# Patient Record
Sex: MALE | Race: BLACK OR AFRICAN AMERICAN | Employment: OTHER | URBAN - METROPOLITAN AREA
[De-identification: names, ages, dates, MRNs, and addresses within clinical notes are randomized per-mention and may not be internally consistent; named-entity substitution may affect disease eponyms.]

---

## 2017-01-17 DIAGNOSIS — F41.9 ANXIETY: ICD-10-CM

## 2017-01-17 RX ORDER — ALPRAZOLAM 0.25 MG/1
0.25 TABLET ORAL
Qty: 90 TAB | Refills: 2 | Status: SHIPPED | OUTPATIENT
Start: 2017-01-17 | End: 2017-02-24 | Stop reason: SDUPTHER

## 2017-01-17 NOTE — TELEPHONE ENCOUNTER
Requested Prescriptions     Pending Prescriptions Disp Refills    ALPRAZolam (XANAX) 0.25 mg tablet 90 Tab 2     Sig: Take 1 Tab by mouth three (3) times daily as needed for Anxiety. Max Daily Amount: 0.75 mg. Last office visit was  10/21/16  Next office visit is     2/24/17    90 tabs prescribed 12/5/16 with 2 refills    Called and spoke with the wife, informed her that the pt should have two refills on file. She stated that they are switching pharmacy from [facility] to [facility] and can no longer get prescriptions from any [facility] pharmacies.     Please assist.

## 2017-01-17 NOTE — TELEPHONE ENCOUNTER
Left message for patient prescription is ready for  at the front office. Any questions call the office.

## 2017-02-02 RX ORDER — TRAMADOL HYDROCHLORIDE 50 MG/1
TABLET ORAL
Qty: 60 TAB | Refills: 0 | Status: SHIPPED | OUTPATIENT
Start: 2017-02-02 | End: 2017-02-24 | Stop reason: SDUPTHER

## 2017-02-06 DIAGNOSIS — F41.9 ANXIETY: ICD-10-CM

## 2017-02-06 RX ORDER — ALPRAZOLAM 0.25 MG/1
0.25 TABLET ORAL
Qty: 90 TAB | Refills: 2 | Status: CANCELLED | OUTPATIENT
Start: 2017-02-06

## 2017-02-06 NOTE — TELEPHONE ENCOUNTER
Pt called in requesting refill of his   Requested Prescriptions     Pending Prescriptions Disp Refills    ALPRAZolam (XANAX) 0.25 mg tablet 90 Tab 2     Sig: Take 1 Tab by mouth three (3) times daily as needed for Anxiety. Max Daily Amount: 0.75 mg.   .

## 2017-02-06 NOTE — TELEPHONE ENCOUNTER
Requested Prescriptions     Pending Prescriptions Disp Refills    ALPRAZolam (XANAX) 0.25 mg tablet 90 Tab 2     Sig: Take 1 Tab by mouth three (3) times daily as needed for Anxiety. Max Daily Amount: 0.75 mg. Last office visit was  10/21/16  Next office visit is     2/24/17     90 tabs prescribed 1/17/17 with 2 refills. Called the pharmacy and verified with amrita that the pt does have refills on file. Called to inform the pt but the mailbox was full.     Please assist.

## 2017-02-10 RX ORDER — PANTOPRAZOLE SODIUM 40 MG/1
40 TABLET, DELAYED RELEASE ORAL DAILY
Qty: 30 TAB | Refills: 3 | Status: SHIPPED | OUTPATIENT
Start: 2017-02-10 | End: 2017-06-09 | Stop reason: SDUPTHER

## 2017-02-10 NOTE — TELEPHONE ENCOUNTER
pts wife request the following medication refilled/pharmacy changed from prior refill     pantoprazole (PROTONIX) 40 mg tablet     [place] [facility] [place] [id number]

## 2017-02-17 ENCOUNTER — HOSPITAL ENCOUNTER (OUTPATIENT)
Dept: LAB | Age: 77
Discharge: HOME | End: 2017-02-17
Payer: MEDICARE

## 2017-02-17 DIAGNOSIS — E78.5 DYSLIPIDEMIA: ICD-10-CM

## 2017-02-17 DIAGNOSIS — I10 HTN (HYPERTENSION), BENIGN: ICD-10-CM

## 2017-02-17 DIAGNOSIS — E11.40 TYPE 2 DIABETES MELLITUS WITH DIABETIC NEUROPATHY, WITHOUT LONG-TERM CURRENT USE OF INSULIN (HCC): ICD-10-CM

## 2017-02-17 PROCEDURE — 36415 COLL VENOUS BLD VENIPUNCTURE: CPT | Performed by: INTERNAL MEDICINE

## 2017-02-17 PROCEDURE — 83036 HEMOGLOBIN GLYCOSYLATED A1C: CPT | Performed by: INTERNAL MEDICINE

## 2017-02-17 PROCEDURE — 80053 COMPREHEN METABOLIC PANEL: CPT | Performed by: INTERNAL MEDICINE

## 2017-02-17 PROCEDURE — 80061 LIPID PANEL: CPT | Performed by: INTERNAL MEDICINE

## 2017-02-18 LAB
ALBUMIN SERPL BCP-MCNC: 4 G/DL (ref 3.4–5)
ALBUMIN/GLOB SERPL: 1.3 {RATIO} (ref 0.8–1.7)
ALP SERPL-CCNC: 156 U/L (ref 45–117)
ALT SERPL-CCNC: 34 U/L (ref 16–61)
ANION GAP BLD CALC-SCNC: 12 MMOL/L (ref 3–18)
AST SERPL W P-5'-P-CCNC: 31 U/L (ref 15–37)
BILIRUB SERPL-MCNC: 0.5 MG/DL (ref 0.2–1)
BUN SERPL-MCNC: 10 MG/DL (ref 7–18)
BUN/CREAT SERPL: 8 (ref 12–20)
CALCIUM SERPL-MCNC: 8 MG/DL (ref 8.5–10.1)
CHLORIDE SERPL-SCNC: 103 MMOL/L (ref 100–108)
CHOLEST SERPL-MCNC: 105 MG/DL
CO2 SERPL-SCNC: 28 MMOL/L (ref 21–32)
CREAT SERPL-MCNC: 1.33 MG/DL (ref 0.6–1.3)
GLOBULIN SER CALC-MCNC: 3.1 G/DL (ref 2–4)
GLUCOSE SERPL-MCNC: 111 MG/DL (ref 74–99)
HBA1C MFR BLD: 6 % (ref 4.2–5.6)
HDLC SERPL-MCNC: 42 MG/DL (ref 40–60)
HDLC SERPL: 2.5 {RATIO} (ref 0–5)
LDLC SERPL CALC-MCNC: 33.6 MG/DL (ref 0–100)
LIPID PROFILE,FLP: NORMAL
POTASSIUM SERPL-SCNC: 3.7 MMOL/L (ref 3.5–5.5)
PROT SERPL-MCNC: 7.1 G/DL (ref 6.4–8.2)
SODIUM SERPL-SCNC: 143 MMOL/L (ref 136–145)
TRIGL SERPL-MCNC: 147 MG/DL (ref ?–150)
VLDLC SERPL CALC-MCNC: 29.4 MG/DL

## 2017-02-24 ENCOUNTER — OFFICE VISIT (OUTPATIENT)
Dept: INTERNAL MEDICINE CLINIC | Age: 77
End: 2017-02-24

## 2017-02-24 VITALS
TEMPERATURE: 98.2 F | WEIGHT: 241 LBS | BODY MASS INDEX: 36.53 KG/M2 | HEIGHT: 68 IN | DIASTOLIC BLOOD PRESSURE: 84 MMHG | OXYGEN SATURATION: 95 % | SYSTOLIC BLOOD PRESSURE: 131 MMHG | RESPIRATION RATE: 18 BRPM | HEART RATE: 85 BPM

## 2017-02-24 DIAGNOSIS — I10 HTN (HYPERTENSION), BENIGN: ICD-10-CM

## 2017-02-24 DIAGNOSIS — F41.9 ANXIETY: ICD-10-CM

## 2017-02-24 DIAGNOSIS — F02.818 LATE ONSET ALZHEIMER'S DISEASE WITH BEHAVIORAL DISTURBANCE (HCC): ICD-10-CM

## 2017-02-24 DIAGNOSIS — Z00.00 ROUTINE GENERAL MEDICAL EXAMINATION AT A HEALTH CARE FACILITY: Primary | ICD-10-CM

## 2017-02-24 DIAGNOSIS — Z13.39 SCREENING FOR ALCOHOLISM: ICD-10-CM

## 2017-02-24 DIAGNOSIS — E78.5 DYSLIPIDEMIA: ICD-10-CM

## 2017-02-24 DIAGNOSIS — E11.40 TYPE 2 DIABETES MELLITUS WITH DIABETIC NEUROPATHY, WITHOUT LONG-TERM CURRENT USE OF INSULIN (HCC): ICD-10-CM

## 2017-02-24 DIAGNOSIS — G30.1 LATE ONSET ALZHEIMER'S DISEASE WITH BEHAVIORAL DISTURBANCE (HCC): ICD-10-CM

## 2017-02-24 RX ORDER — ALPRAZOLAM 0.25 MG/1
0.25 TABLET ORAL
Qty: 90 TAB | Refills: 2 | Status: SHIPPED | OUTPATIENT
Start: 2017-02-24 | End: 2017-08-16 | Stop reason: SDUPTHER

## 2017-02-24 RX ORDER — TRAMADOL HYDROCHLORIDE 50 MG/1
TABLET ORAL
Qty: 60 TAB | Refills: 0 | Status: SHIPPED | OUTPATIENT
Start: 2017-02-24 | End: 2017-04-05 | Stop reason: SDUPTHER

## 2017-02-24 NOTE — PROGRESS NOTES
Subjective:       Chief Complaint  The patient presents for follow up of diabetes, hypertension and high cholesterol. JOE Baltazar Sr. is a 68 y.o. male seen for follow up of diabetes. Healso has hypertension and hyperlipidemia. Diabetes well controlled, no significant medication side effects noted, on metformin, hypertension well controlled, no significant medication side effects noted, on Norvasc, pt has no proteinuria to be on ACE-I currently , hyperlipidemia well controlled, no significant medication side effects noted, on Crestor 5 mg    Diet and Lifestyle: not attempting to follow a low fat, low cholesterol diet, does not rigorously follow a diabetic diet, sedentary    Home BP Monitoring: is not measured at home. Diabetic Review of Systems - home glucose monitoring: is performed sporadically, should be 1x/day. Other symptoms and concerns: pt's Dementia continues to progress even  on Namenda and Aricept. His behavioral problems and difficulty with insomnia has improved on Seroquel. Will refer back to Neurology since family is concerned about his rapid progression and frequent falls. Anxiety remains stable on Xanax, Lexapro and Seroquel. Wife is aware that Xanax and Seroquel can cause some confusion. So when pt is very confused she will monitor if these meds were given together. Discussed the patient's BMI with him. The BMI follow up plan is as follows: BMI is out of normal parameters and plan is as follows: I have counseled this patient on diet and exercise regimens. Current Outpatient Prescriptions   Medication Sig    traMADol (ULTRAM) 50 mg tablet take 2 tablets by mouth every 6 hours if needed (DO NOT TAKE MORE THAN 8 TABLETS A DAY)    ALPRAZolam (XANAX) 0.25 mg tablet Take 1 Tab by mouth three (3) times daily as needed for Anxiety. Max Daily Amount: 0.75 mg.  pantoprazole (PROTONIX) 40 mg tablet Take 1 Tab by mouth daily.     rosuvastatin (CRESTOR) 5 mg tablet take 1 tablet by mouth at bedtime if needed    memantine (NAMENDA) 10 mg tablet take 1 tablet by mouth twice a day    metoprolol succinate (TOPROL-XL) 50 mg XL tablet take 1 tablet by mouth once daily    escitalopram oxalate (LEXAPRO) 10 mg tablet take 1 tablet by mouth once daily    QUEtiapine (SEROQUEL) 50 mg tablet take 1 tablet by mouth once daily at bedtime    amLODIPine (NORVASC) 5 mg tablet take 1 tablet by mouth once daily    levETIRAcetam (KEPPRA) 250 mg tablet Take 1 Tab by mouth two (2) times a day.  cholecalciferol (VITAMIN D3) 1,000 unit tablet Take 1,000 Units by mouth daily.  donepezil (ARICEPT) 23 mg film coated tablet     aspirin 81 mg chewable tablet Take 1 Tab by mouth daily.  OTHER Check CBC, CMP, Mg in 5 days, results to PCP immediately, Diagnosis- HTN    furosemide (LASIX) 20 mg tablet Take 2 Tabs by mouth daily as needed (for leg swelling).  multivitamin, tx-iron-ca-min (THERA-M W/ IRON) 9 mg iron-400 mcg tab tablet Take 1 Tab by mouth daily.  OTHER Compound Cream  Gabapentin 6%, Baclofen 2%, Cyclobenzaprine 2%, Lidocaine 2%, Flurbiprofen 10% with Ketamine 10%  Apply 1-2 grams (1-2 pumps) to affected area 3-4 times  120grams/2 refills     No current facility-administered medications for this visit.               Review of Systems  Respiratory: negative for dyspnea on exertion  Cardiovascular: negative for chest pain    Objective:     Visit Vitals    /84 (BP 1 Location: Left arm, BP Patient Position: Sitting)    Pulse 85    Temp 98.2 °F (36.8 °C) (Oral)    Resp 18    Ht 5' 8\" (1.727 m)    Wt 241 lb (109.3 kg)    SpO2 95%    BMI 36.64 kg/m2        General appearance - alert, well appearing, and in no distress  Neck - supple, no significant adenopathy, carotids upstroke normal bilaterally, no bruits  Chest - clear to auscultation, no wheezes, rales or rhonchi, symmetric air entry  Heart - normal rate, regular rhythm, normal S1, S2, no murmurs, rubs, clicks or gallops  Neurological - paresthesias in feet   Extremities - peripheral pulses normal, no pedal edema, no clubbing or cyanosis  Skin - normal coloration and turgor, no rashes, no suspicious skin lesions noted      Labs:   Lab Results   Component Value Date/Time    Hemoglobin A1c 6.0 02/17/2017 08:36 AM    Hemoglobin A1c 5.8 10/18/2016 10:53 AM    Hemoglobin A1c 6.3 06/13/2016 09:00 AM    Glucose 111 02/17/2017 08:36 AM    Glucose (POC) 122 04/01/2016 11:16 AM    Microalbumin/Creat ratio (mg/g creat)  10/18/2016 10:53 AM     Cannot calculate ratio due to micro albumin result outside reportable range. Microalbumin,urine random <0.13 10/18/2016 10:53 AM    LDL, calculated 33.6 02/17/2017 08:36 AM    Creatinine 1.33 02/17/2017 08:36 AM      Lab Results   Component Value Date/Time    Cholesterol, total 105 02/17/2017 08:36 AM    HDL Cholesterol 42 02/17/2017 08:36 AM    LDL, calculated 33.6 02/17/2017 08:36 AM    Triglyceride 147 02/17/2017 08:36 AM    CHOL/HDL Ratio 2.5 02/17/2017 08:36 AM     Lab Results   Component Value Date/Time    ALT (SGPT) 34 02/17/2017 08:36 AM    AST (SGOT) 31 02/17/2017 08:36 AM    Alk. phosphatase 156 02/17/2017 08:36 AM    Bilirubin, total 0.5 02/17/2017 08:36 AM     Lab Results   Component Value Date/Time    GFR est AA >60 02/17/2017 08:36 AM    GFR est non-AA 52 02/17/2017 08:36 AM    Creatinine 1.33 02/17/2017 08:36 AM    BUN 10 02/17/2017 08:36 AM    Sodium 143 02/17/2017 08:36 AM    Potassium 3.7 02/17/2017 08:36 AM    Chloride 103 02/17/2017 08:36 AM    CO2 28 02/17/2017 08:36 AM            Assessment / Plan     Diabetes well controlled, on metformin  Hypertension well controlled, on current meds, consider changing to or adding an ARB if any change in meds or BP  Hyperlipidemia well controlled, on crestor. ICD-10-CM ICD-9-CM    1. Routine general medical examination at a health care facility Z00.00 V70.0    2.  Anxiety F41.9 300.00 Stable on ALPRAZolam (XANAX) 0.25 mg tablet, Lexapro and Seroquel    3. Screening for alcoholism Z13.89 V79.1    4. Type 2 diabetes mellitus with diabetic neuropathy, without long-term current use of insulin (HCC) E11.40 250.60 HEMOGLOBIN A1C W/O EAG     357.2    5. HTN (hypertension), benign D62 483.0 METABOLIC PANEL, COMPREHENSIVE   6. Dyslipidemia E78.5 272.4 LIPID PANEL   7. Late onset Alzheimer's disease with behavioral disturbance G30.1 331.0 REFERRAL TO NEUROLOGY for further evaluation. Continue Namenda and Aricept at this time along with Seroquel to help with behavior. Family made aware Ultram, Seroquel and xanax can be sedating so do not use them at the same time. F02.81 294.11                  Diabetic issues reviewed with him: diabetic diet discussed in detail, and low cholesterol diet, weight control and daily exercise discussed. Follow-up Disposition:  Return in about 4 months (around 6/24/2017) for labs 1 week before. Reviewed plan of care. Patient has provided input and agrees with goals.

## 2017-02-24 NOTE — PATIENT INSTRUCTIONS
Diabetes and Preventing Falls: Care Instructions  Your Care Instructions  If you are an older adult who has diabetes, you may have a higher risk of falling. Complications of diabetessuch as nerve damage, foot problems, and reduced visionmay increase your risk of a fall. Some of your medicines also may add to your risk. By making your home safer, you can lower your risk of falling. Doing things to prevent diabetes complications may also help to lower your risk. You can make your home safer with a few simple measures. Follow-up care is a key part of your treatment and safety. Be sure to make and go to all appointments, and call your doctor if you are having problems. It's also a good idea to know your test results and keep a list of the medicines you take. How can you care for yourself at home? Taking care of yourself  · Keep your blood sugar at a target level (which you set with your doctor). · Exercise regularly to improve your strength, muscle tone, and balance. Walk if you can. Swimming may be a good choice if you cannot walk easily. · Have your vision checked as often as your doctor recommends. It is usually once a year or more often if you have eye problems. · Know the side effects of the medicines you take. Ask your doctor or pharmacist whether the medicines you take can affect your balance. Sleeping pills or sedatives can affect your balance. · Limit the amount of alcohol you drink. Alcohol can impair your balance and other senses. · Have your doctor check your feet during each visit. If you have a foot problem, see your doctor. Preventing falls at home  · Remove raised doorway thresholds, throw rugs, and clutter. Repair loose carpet or raised areas in the floor. · Move furniture and electrical cords to keep them out of walking paths. · Use nonskid floor wax, and wipe up spills right away, especially on ceramic tile floors. · If you use a walker or cane, put rubber tips on it.  If you use crutches, clean the bottoms of them regularly with an abrasive pad, such as steel wool. · Keep your house well lit, especially [facility], and outside walkways. Use night-lights in areas such as hallways and bathrooms. Add extra light switches or use remote switches (such as switches that go on or off when you clap your hands) to make it easier to turn lights on if you have to get up during the night. · Install sturdy handrails on stairways. Put grab bars near your shower, bathtub, and toilet. · Store household items on low shelves so that you do not have to climb or reach high. Or use a reaching device that you can get at a medical supply store. If you have to climb for something, use a step stool with handrails, or ask someone to get it for you. · Keep a cordless phone and a flashlight with new batteries by your bed. If possible, put a phone in each of the main rooms of your house, or carry a cell phone in case you fall and cannot reach a phone. Or you can wear a device around your neck or wrist. You push a button that sends a signal for help. · Wear low-heeled shoes that fit well and give your feet good support. Use footwear with nonskid soles. Check the heels and soles of your shoes for wear. Repair or replace worn heels or soles. · Do not wear socks without shoes on wood floors. · Walk on the grass when the sidewalks are slippery. If you live in an area that gets snow and ice in the winter, sprinkle salt on slippery steps and sidewalks. Where can you learn more? Go to http://christie-brigid.info/. Enter Q595 in the search box to learn more about \"Diabetes and Preventing Falls: Care Instructions. \"  Current as of: August 4, 2016  Content Version: 11.1  © 6716-6178 [employer]. Care instructions adapted under license by [employer] (which disclaims liability or warranty for this information).  If you have questions about a medical condition or this instruction, always ask your healthcare professional. [place] [address] any warranty or liability for your use of this information. Medicare Part B Preventive Services Limitations Recommendation Scheduled   Bone Mass Measurement  (age 72 & older, biennial) Requires diagnosis related to osteoporosis or estrogen deficiency. Biennial benefit unless patient has history of long-term glucocorticoid tx or baseline is needed because initial test was by other method  NA   Cardiovascular Screening Blood Tests (every 5 years)  Total cholesterol, HDL, Triglycerides Order as a panel if possible  2/2017   Colorectal Cancer Screening  -Fecal occult blood test (annual)  -Flexible sigmoidoscopy (5y)  -Screening colonoscopy (10y)  -Barium Enema   NA   Counseling to Prevent Tobacco Use (up to 8 sessions per year)  - Counseling greater than 3 and up to 10 minutes  - Counseling greater than 10 minutes Patients must be asymptomatic of tobacco-related conditions to receive as preventive service  NA   Diabetes Screening Tests (at least every 3 years, Medicare covers annually or at 6-month intervals for prediabetic patients)    Fasting blood sugar (FBS) or glucose tolerance test (GTT) Patient must be diagnosed with one of the following:  -Hypertension, Dyslipidemia, obesity, previous impaired FBS or GTT  Or any two of the following: overweight, FH of diabetes, age ? 72, history of gestational diabetes, birth of baby weighing more than 9 pounds  2/2017   Diabetes Self-Management Training (DSMT) (no USPSTF recommendation) Requires referral by treating physician for patient with diabetes or renal disease. 10 hours of initial DSMT session of no less than 30 minutes each in a continuous 12-month period. 2 hours of follow-up DSMT in subsequent years.   11/2015   Glaucoma Screening (no USPSTF recommendation) Diabetes mellitus, family history, , age 48 or over,  American, age 72 or over  3/2015   [place]   Human Immunodeficiency Virus (HIV) Screening (annually for increased risk patients)  HIV-1 and HIV-2 by EIA, FLORENCIO, rapid antibody test, or oral mucosa transudate Patient must be at increased risk for HIV infection per USPSTF guidelines or pregnant. Tests covered annually for patients at increased risk. Pregnant patients may receive up to 3 test during pregnancy. NA   Medical Nutrition Therapy (MNT) (for diabetes or renal disease not recommended schedule) Requires referral by treating physician for patient with diabetes or renal disease. Can be provided in same year as diabetes self-management training (DSMT), and CMS recommends medical nutrition therapy take place after DSMT. Up to 3 hours for initial year and 2 hours in subsequent years. NA   Prostate Cancer Screening (annually up to age 76)  - Digital rectal exam (HEATHER)  - Prostate specific antigen (PSA) Annually (age 48 or over), HEATHER not paid separately when covered E/M service is provided on same date  Men up to age 76 may need a screening blood test for prostate cancer at certain intervals, depending on their personal and family history. This decision is between the patient and his provider. 6/2016   Seasonal Influenza Vaccination (annually)   10/2016     Pneumococcal Vaccination (once after 72)   10/2013   Hepatitis B Vaccinations (if medium/high risk) Medium/high risk factors:  End-stage renal disease,  Hemophiliacs who received Factor VIII or IX concentrates, Clients of institutions for the mentally retarded, Persons who live in the same house as a HepB virus carrier, Homosexual men, Illicit injectable drug abusers. NA   Shingles Vaccination A shingles vaccine is also recommended once in a lifetime after age 61  Never had chicken pox   Ultrasound Screening for Abdominal Aortic Aneurysm (AAA) (once) Patient must be referred through [facility] and not have had a screening for abdominal aortic aneurysm before under Medicare.   Limited to patients who meet one of the following criteria:  - Men who are 73-68 years old and have smoked more than 100 cigarettes in their lifetime.  -Anyone with a FH of AAA  -Anyone recommended for screening by USPSTF  NA

## 2017-02-24 NOTE — PROGRESS NOTES
Patient is in the office today for a 4 month follow up and Medicare Wellness Visit. Do you have an Advance Directive yes      1. Have you been to the ER, urgent care clinic since your last visit? Hospitalized since your last visit? No    2. Have you seen or consulted any other health care providers outside of the [address] [place] [address] since your last visit? Include any pap smears or colon screening. No        This is a Subsequent Medicare Annual Wellness Visit providing Personalized Prevention Plan Services (PPPS) (Performed 12 months after initial AWV and PPPS )    I have reviewed the patient's medical history in detail and updated the computerized patient record. History     Past Medical History:   Diagnosis Date    Anxiety     Arthritis     Cancer ([facility] [facility] 2009    bladder     Carotid duplex 05/26/2011    No occlusive disease >49% bilaterally.     Chronic back pain 5/6/2010    Chronic traumatic encephalopathy     Dementia     Depression     he has taken Xanax for 16 years for this    Diabetes ([facility] [address] [facility]     Dizziness     ED (erectile dysfunction)     Fibrosis of knee joint     Peripatellar, right    Headache(784.0)     HTN (hypertension), benign 4/1/2010    Late onset Alzheimer's disease with behavioral disturbance     Left wrist pain     Lumbar spinal stenosis 9/25/2010    Neuropathy of lower extremity     Obesity     Osteoarthritis of both knees     Sleep apnea     cpap use    SOB (shortness of breath)     Vertigo, benign positional       Past Surgical History:   Procedure Laterality Date    BIOPSY  12/19/2007    HX OTHER SURGICAL  11/2014    right knee    HX UROLOGICAL      Bladder CA     Current Outpatient Prescriptions   Medication Sig Dispense Refill    traMADol (ULTRAM) 50 mg tablet take 2 tablets by mouth every 6 hours if needed (DO NOT TAKE MORE THAN 8 TABLETS A DAY) 60 Tab 0    ALPRAZolam (XANAX) 0.25 mg tablet Take 1 Tab by mouth three (3) times daily as needed for Anxiety. Max Daily Amount: 0.75 mg. 90 Tab 2    pantoprazole (PROTONIX) 40 mg tablet Take 1 Tab by mouth daily. 30 Tab 3    rosuvastatin (CRESTOR) 5 mg tablet take 1 tablet by mouth at bedtime if needed 30 Tab 5    memantine (NAMENDA) 10 mg tablet take 1 tablet by mouth twice a day 60 Tab 5    metoprolol succinate (TOPROL-XL) 50 mg XL tablet take 1 tablet by mouth once daily 30 Tab 5    escitalopram oxalate (LEXAPRO) 10 mg tablet take 1 tablet by mouth once daily 30 Tab 5    QUEtiapine (SEROQUEL) 50 mg tablet take 1 tablet by mouth once daily at bedtime 30 Tab 5    amLODIPine (NORVASC) 5 mg tablet take 1 tablet by mouth once daily 30 Tab 5    levETIRAcetam (KEPPRA) 250 mg tablet Take 1 Tab by mouth two (2) times a day. 60 Tab 5    cholecalciferol (VITAMIN D3) 1,000 unit tablet Take 1,000 Units by mouth daily.  donepezil (ARICEPT) 23 mg film coated tablet   0    aspirin 81 mg chewable tablet Take 1 Tab by mouth daily. 30 Tab 0    OTHER Check CBC, CMP, Mg in 5 days, results to PCP immediately, Diagnosis- HTN 1 Each 0    furosemide (LASIX) 20 mg tablet Take 2 Tabs by mouth daily as needed (for leg swelling). 60 Tab 2    multivitamin, tx-iron-ca-min (THERA-M W/ IRON) 9 mg iron-400 mcg tab tablet Take 1 Tab by mouth daily.       OTHER Compound Cream  Gabapentin 6%, Baclofen 2%, Cyclobenzaprine 2%, Lidocaine 2%, Flurbiprofen 10% with Ketamine 10%  Apply 1-2 grams (1-2 pumps) to affected area 3-4 times  120grams/2 refills       Allergies   Allergen Reactions    Chlorhexidine Towelette Itching     Family History   Problem Relation Age of Onset    Hypertension Mother     Heart Disease Mother     Other Mother      Alzheimer's disease    Diabetes Neg Hx      Social History   Substance Use Topics    Smoking status: Former Smoker    Smokeless tobacco: Never Used      Comment: quit smoking in 2000    Alcohol use No     Patient Active Problem List   Diagnosis Code    Headache     Depression F32.9    ED (erectile dysfunction) N52.9    Vertigo, benign positional H81.10    Sleep apnea G47.30    HTN (hypertension), benign I10    Chronic back pain M54.9, G89.29    PN (peripheral neuropathy) ([facility]) G62.9    High triglycerides E78.1    Lumbar spinal stenosis M48.06    Anxiety F41.9    Fatigue R53.83    Memory loss R41.3    DM (diabetes mellitus) ([facility] [address] E11.9    Malignant neoplasm of bladder, part unspecified ([facility]) C67.9    Urinary system symptoms, other     Borderline high cholesterol E78.9    SOB (shortness of breath) R06.02    Dizziness R42    Dementia F03.90    Dyslipidemia E78.5    Osteoarthritis of right knee M17.9    Anemia D64.9    GI bleed due to NSAIDs K92.2, T39.395A    CTE (chronic traumatic encephalopathy) F07.81    Status post total right knee replacement Z96.651    Primary osteoarthritis of left knee M17.12    ACP (advance care planning) Z71.89    Late onset Alzheimer's disease with behavioral disturbance G30.1, F02.81    ACS (acute coronary syndrome) ([facility]) I24.9    Troponin level elevated R79.89       Depression Risk Factor Screening:   No flowsheet data found. Alcohol Risk Factor Screening:   Patients wife states he has a spoonful of peach schnapps rarely. Functional Ability and Level of Safety:     Hearing Loss   Patient states he does not have any hearing loss. Activities of Daily Living   Partial assistance. Requires assistance with: ambulation, bathing and hygiene, grooming and dressing    Fall Risk     Fall Risk Assessment, last 12 mths 2/24/2017   Able to walk? Yes   Fall in past 12 months? Yes   Fall with injury? No   Number of falls in past 12 months 3   Fall Risk Score 3     Abuse Screen   Patient is not abused    Review of Systems   A comprehensive review of systems was negative except for that written in the HPI.     Physical Examination     Evaluation of Cognitive Function:  Mood/affect:  neutral  Appearance: age appropriate  Family member/caregiver input: Wife Ms. Ang    Visit Vitals    /84 (BP 1 Location: Left arm, BP Patient Position: Sitting)    Pulse 85    Temp 98.2 °F (36.8 °C) (Oral)    Resp 18    Ht 5' 8\" (1.727 m)    Wt 241 lb (109.3 kg)    SpO2 95%    BMI 36.64 kg/m2       Patient Care Team:  Vishnu Rajan MD as PCP - Stephenie Slade DPM (Podiatry)  Debbie Winter MD (Neurology)  Hunter Gould MD (Orthopedic Surgery)  Alesha Rodriguez MD (Ophthalmology)  Rissa Angeles, MARQUISE as Nurse Navigator    Advice/Referrals/Counseling   Education and counseling provided:  Are appropriate based on today's review and evaluation  End-of-Life planning (with patient's consent)  Pneumococcal Vaccine    Glaucoma Screening- followed by Dr MAN Last seen in 1016   Pneumonia Vaccine- needs PCV-13  Shingles Vaccine-  Never had chicken pox  Tdap Vaccine- declines at this time   Colonoscopy-  Not candidate due to age and dementia   PSA- 6/16 0.8. No further testing needed   Advance Directive-  Pt has one. Will get copy     Assessment/Plan       ICD-10-CM ICD-9-CM    1. Routine general medical examination at a health care facility Z00.00 V70.0    2. Anxiety F41.9 300.00 ALPRAZolam (XANAX) 0.25 mg tablet   3. Screening for alcoholism Z13.89 V79.1    4. Type 2 diabetes mellitus with diabetic neuropathy, without long-term current use of insulin (HCC) E11.40 250.60 HEMOGLOBIN A1C W/O EAG     357.2    5. HTN (hypertension), benign D43 823.4 METABOLIC PANEL, COMPREHENSIVE   6. Dyslipidemia E78.5 272.4 LIPID PANEL   7. Late onset Alzheimer's disease with behavioral disturbance G30.1 331.0 REFERRAL TO NEUROLOGY    F02.81 294.11    .    A comprehensive 5 year plan for medical care and screening exams was reviewed with pt and they received a copy of it.

## 2017-02-24 NOTE — MR AVS SNAPSHOT
Visit Information Date & Time Provider Department Dept. Phone Encounter #  
 2/24/2017  8:45 AM Maicol Feliz MD [facility] [employer] [phone] Follow-up Instructions Return in about 4 months (around 6/24/2017) for labs 1 week before. Upcoming Health Maintenance Date Due DTaP/Tdap/Td series (1 - Tdap) 6/16/1961 EYE EXAM RETINAL OR DILATED Q1 3/24/2016 FOOT EXAM Q1 6/15/2016 COLONOSCOPY 1/22/2017 MEDICARE YEARLY EXAM 2/19/2017 GLAUCOMA SCREENING Q2Y 3/24/2017 Pneumococcal 65+ High/Highest Risk (2 of 2 - PCV13) 2/28/2017* HEMOGLOBIN A1C Q6M 8/17/2017 MICROALBUMIN Q1 10/18/2017 LIPID PANEL Q1 2/17/2018 *Topic was postponed. The date shown is not the original due date. Allergies as of 2/24/2017  Review Complete On: 2/24/2017 By: Maicol Feliz MD  
  
 Severity Noted Reaction Type Reactions Chlorhexidine Towelette  11/04/2014    Itching Current Immunizations  Reviewed on 10/21/2016 Name Date Influenza Vaccine 10/23/2014 Influenza Vaccine (Quad) PF 10/21/2016, 10/12/2015 Influenza Vaccine PF 10/10/2013 Pneumococcal Polysaccharide (PPSV-23) 10/10/2013 Not reviewed this visit You Were Diagnosed With   
  
 Codes Comments Anxiety    -  Primary ICD-10-CM: F41.9 ICD-9-CM: 300.00 Routine general medical examination at a health care facility     ICD-10-CM: Z00.00 ICD-9-CM: V70.0 Screening for alcoholism     ICD-10-CM: Z13.89 ICD-9-CM: V79.1 Late onset Alzheimer's disease with behavioral disturbance     ICD-10-CM: G30.1, F02.81 ICD-9-CM: 331.0, 294.11 Vitals BP  
  
  
  
  
  
 131/84 (BP 1 Location: Left arm, BP Patient Position: Sitting) Vitals History BMI and BSA Data Body Mass Index Body Surface Area  
 36.64 kg/m 2 2.29 m 2 Preferred Pharmacy Pharmacy Name Phone [id number] [address] [place] [facility] [phone] Your Updated Medication List  
  
   
This list is accurate as of: 2/24/17  9:22 AM.  Always use your most recent med list.  
  
  
  
  
 ALPRAZolam 0.25 mg tablet Commonly known as:  Veronica Fell Take 1 Tab by mouth three (3) times daily as needed for Anxiety. Max Daily Amount: 0.75 mg. amLODIPine 5 mg tablet Commonly known as:  NORVASC  
take 1 tablet by mouth once daily  
  
 aspirin 81 mg chewable tablet Take 1 Tab by mouth daily. cholecalciferol 1,000 unit tablet Commonly known as:  VITAMIN D3 Take 1,000 Units by mouth daily. donepezil 23 mg film coated tablet Commonly known as:  ARICEPT  
  
 escitalopram oxalate 10 mg tablet Commonly known as:  LEXAPRO  
take 1 tablet by mouth once daily  
  
 furosemide 20 mg tablet Commonly known as:  LASIX Take 2 Tabs by mouth daily as needed (for leg swelling). levETIRAcetam 250 mg tablet Commonly known as:  KEPPRA Take 1 Tab by mouth two (2) times a day. memantine 10 mg tablet Commonly known as:  NAMENDA  
take 1 tablet by mouth twice a day  
  
 metoprolol succinate 50 mg XL tablet Commonly known as:  TOPROL-XL  
take 1 tablet by mouth once daily  
  
 multivitamin, tx-iron-ca-min 9 mg iron-400 mcg Tab tablet Commonly known as:  THERA-M w/ IRON Take 1 Tab by mouth daily. * OTHER Compound Cream Gabapentin 6%, Baclofen 2%, Cyclobenzaprine 2%, Lidocaine 2%, Flurbiprofen 10% with Ketamine 10% Apply 1-2 grams (1-2 pumps) to affected area 3-4 times 120grams/2 refills * OTHER Check CBC, CMP, Mg in 5 days, results to PCP immediately, Diagnosis- HTN  
  
 pantoprazole 40 mg tablet Commonly known as:  PROTONIX Take 1 Tab by mouth daily. QUEtiapine 50 mg tablet Commonly known as:  SEROquel  
take 1 tablet by mouth once daily at bedtime  
  
 rosuvastatin 5 mg tablet Commonly known as:  CRESTOR  
take 1 tablet by mouth at bedtime if needed  
  
 traMADol 50 mg tablet Commonly known as:  Julia   
 take 2 tablets by mouth every 6 hours if needed (DO NOT TAKE MORE THAN 8 TABLETS A DAY) * Notice: This list has 2 medication(s) that are the same as other medications prescribed for you. Read the directions carefully, and ask your doctor or other care provider to review them with you. Prescriptions Printed Refills  
 traMADol (ULTRAM) 50 mg tablet 0 Sig: take 2 tablets by mouth every 6 hours if needed (DO NOT TAKE MORE THAN 8 TABLETS A DAY) Class: Print ALPRAZolam (XANAX) 0.25 mg tablet 2 Sig: Take 1 Tab by mouth three (3) times daily as needed for Anxiety. Max Daily Amount: 0.75 mg. Class: Print Route: Oral  
  
We Performed the Following REFERRAL TO NEUROLOGY [QFO68 Custom] Comments:  
 Refer to Dr Trevin Barker for dementia Follow-up Instructions Return in about 4 months (around 6/24/2017) for labs 1 week before. Referral Information Referral ID Referred By Referred To  
  
 [id number] PAOLA DERAS MD   
   [address] [place] [address], [address] [place] [address] Phone: [phone] Fax: [phone] Visits Status Start Date End Date 1 New Request 2/24/17 2/24/18 If your referral has a status of pending review or denied, additional information will be sent to support the outcome of this decision. Patient Instructions Diabetes and Preventing Falls: Care Instructions Your Care Instructions If you are an older adult who has diabetes, you may have a higher risk of falling. Complications of diabetessuch as nerve damage, foot problems, and reduced visionmay increase your risk of a fall. Some of your medicines also may add to your risk. By making your home safer, you can lower your risk of falling. Doing things to prevent diabetes complications may also help to lower your risk. You can make your home safer with a few simple measures. Follow-up care is a key part of your treatment and safety. Be sure to make and go to all appointments, and call your doctor if you are having problems. It's also a good idea to know your test results and keep a list of the medicines you take. How can you care for yourself at home? Taking care of yourself · Keep your blood sugar at a target level (which you set with your doctor). · Exercise regularly to improve your strength, muscle tone, and balance. Walk if you can. Swimming may be a good choice if you cannot walk easily. · Have your vision checked as often as your doctor recommends. It is usually once a year or more often if you have eye problems. · Know the side effects of the medicines you take. Ask your doctor or pharmacist whether the medicines you take can affect your balance. Sleeping pills or sedatives can affect your balance. · Limit the amount of alcohol you drink. Alcohol can impair your balance and other senses. · Have your doctor check your feet during each visit. If you have a foot problem, see your doctor. Preventing falls at home · Remove raised doorway thresholds, throw rugs, and clutter. Repair loose carpet or raised areas in the floor. · Move furniture and electrical cords to keep them out of walking paths. · Use nonskid floor wax, and wipe up spills right away, especially on ceramic tile floors. · If you use a walker or cane, put rubber tips on it. If you use crutches, clean the bottoms of them regularly with an abrasive pad, such as steel wool. · Keep your house well lit, especially [place], and outside walkways. Use night-lights in areas such as hallways and bathrooms. Add extra light switches or use remote switches (such as switches that go on or off when you clap your hands) to make it easier to turn lights on if you have to get up during the night. · Install sturdy handrails on stairways. Put grab bars near your shower, bathtub, and toilet. · Store household items on low shelves so that you do not have to climb or reach high. Or use a reaching device that you can get at a medical supply store. If you have to climb for something, use a step stool with handrails, or ask someone to get it for you. · Keep a cordless phone and a flashlight with new batteries by your bed. If possible, put a phone in each of the main rooms of your house, or carry a cell phone in case you fall and cannot reach a phone. Or you can wear a device around your neck or wrist. You push a button that sends a signal for help. · Wear low-heeled shoes that fit well and give your feet good support. Use footwear with nonskid soles. Check the heels and soles of your shoes for wear. Repair or replace worn heels or soles. · Do not wear socks without shoes on wood floors. · Walk on the grass when the sidewalks are slippery. If you live in an area that gets snow and ice in the winter, sprinkle salt on slippery steps and sidewalks. Where can you learn more? Go to http://christieProcera Networksbrigid.info/. Enter F996 in the search box to learn more about \"Diabetes and Preventing Falls: Care Instructions. \" Current as of: August 4, 2016 Content Version: 11.1 © 9499-4501 [employer]wise, [employer]orporated. Care instructions adapted under license by [employer] (which disclaims liability or warranty for this information). If you have questions about a medical condition or this instruction, always ask your healthcare professional. [place] [address] any warranty or liability for your use of this information. Medicare Part B Preventive Services Limitations Recommendation Scheduled Bone Mass Measurement 
(age 72 & older, biennial) Requires diagnosis related to osteoporosis or estrogen deficiency.  Biennial benefit unless patient has history of long-term glucocorticoid tx or baseline is needed because initial test was by other method  NA  
 Cardiovascular Screening Blood Tests (every 5 years) Total cholesterol, HDL, Triglycerides Order as a panel if possible  2/2017 Colorectal Cancer Screening 
-Fecal occult blood test (annual) -Flexible sigmoidoscopy (5y) 
-Screening colonoscopy (10y) -Barium Enema   NA Counseling to Prevent Tobacco Use (up to 8 sessions per year) - Counseling greater than 3 and up to 10 minutes - Counseling greater than 10 minutes Patients must be asymptomatic of tobacco-related conditions to receive as preventive service  NA Diabetes Screening Tests (at least every 3 years, Medicare covers annually or at 6-month intervals for prediabetic patients) Fasting blood sugar (FBS) or glucose tolerance test (GTT) Patient must be diagnosed with one of the following: 
-Hypertension, Dyslipidemia, obesity, previous impaired FBS or GTT 
Or any two of the following: overweight, FH of diabetes, age ? 72, history of gestational diabetes, birth of baby weighing more than 9 pounds  2/2017 Diabetes Self-Management Training (DSMT) (no USPSTF recommendation) Requires referral by treating physician for patient with diabetes or renal disease. 10 hours of initial DSMT session of no less than 30 minutes each in a continuous 12-month period. 2 hours of follow-up DSMT in subsequent years. 11/2015 Glaucoma Screening (no USPSTF recommendation) Diabetes mellitus, family history, , age 48 or over,  American, age 72 or over  3/2015 Dr. Vidhya Ventura Human Immunodeficiency Virus (HIV) Screening (annually for increased risk patients) HIV-1 and HIV-2 by EIA, FLORENCIO, rapid antibody test, or oral mucosa transudate Patient must be at increased risk for HIV infection per USPSTF guidelines or pregnant. Tests covered annually for patients at increased risk. Pregnant patients may receive up to 3 test during pregnancy. NA Medical Nutrition Therapy (MNT) (for diabetes or renal disease not recommended schedule) Requires referral by treating physician for patient with diabetes or renal disease. Can be provided in same year as diabetes self-management training (DSMT), and CMS recommends medical nutrition therapy take place after DSMT. Up to 3 hours for initial year and 2 hours in subsequent years. NA Prostate Cancer Screening (annually up to age 76) - Digital rectal exam (HEATHER) - Prostate specific antigen (PSA) Annually (age 48 or over), HEATHER not paid separately when covered E/M service is provided on same date Men up to age 76 may need a screening blood test for prostate cancer at certain intervals, depending on their personal and family history. This decision is between the patient and his provider. 6/2016 Seasonal Influenza Vaccination (annually)   10/2016 Pneumococcal Vaccination (once after 65)   10/2013 Hepatitis B Vaccinations (if medium/high risk) Medium/high risk factors:  End-stage renal disease, Hemophiliacs who received Factor VIII or IX concentrates, Clients of institutions for the mentally retarded, Persons who live in the same house as a HepB virus carrier, Homosexual men, Illicit injectable drug abusers. NA Shingles Vaccination A shingles vaccine is also recommended once in a lifetime after age 61  Never had chicken pox Ultrasound Screening for Abdominal Aortic Aneurysm (AAA) (once) Patient must be referred through IPPE and not have had a screening for abdominal aortic aneurysm before under Medicare. Limited to patients who meet one of the following criteria: 
- Men who are 73-68 years old and have smoked more than 100 cigarettes in their lifetime. 
-Anyone with a FH of AAA 
-Anyone recommended for screening by USPSTF  NA Introducing [facility]! Alethea Stanford introduces [employer] patient portal. Now you can access parts of your medical record, email your doctor's office, and request medication refills online.    
 
1. In your internet browser, go to [email or web address] 2. Click on the First Time User? Click Here link in the Sign In box. You will see the New Member Sign Up page. 3. Enter your [employer] Access Code exactly as it appears below. You will not need to use this code after youve completed the sign-up process. If you do not sign up before the expiration date, you must request a new code. · [employer] Access Code: [email or web address] Expires: 5/9/2017 12:11 PM 
 
4. Enter the last four digits of your Social Security Number (xxxx) and Date of Birth (mm/dd/yyyy) as indicated and click Submit. You will be taken to the next sign-up page. 5. Create a [employer] ID. This will be your [employer] login ID and cannot be changed, so think of one that is secure and easy to remember. 6. Create a [employer] password. You can change your password at any time. 7. Enter your Password Reset Question and Answer. This can be used at a later time if you forget your password. 8. Enter your e-mail address. You will receive e-mail notification when new information is available in [address] 9. Click Sign Up. You can now view and download portions of your medical record. 10. Click the Download Summary menu link to download a portable copy of your medical information. If you have questions, please visit the Frequently Asked Questions section of the [employer] website. Remember, [employer] is NOT to be used for urgent needs. For medical emergencies, dial 911. Now available from your iPhone and Android! Please provide this summary of care documentation to your next provider. Your primary care clinician is listed as PAOLA DERAS. If you have any questions after today's visit, please call [phone].

## 2017-04-05 RX ORDER — TRAMADOL HYDROCHLORIDE 50 MG/1
TABLET ORAL
Qty: 60 TAB | Refills: 3 | Status: SHIPPED | OUTPATIENT
Start: 2017-04-05 | End: 2017-08-11 | Stop reason: SDUPTHER

## 2017-04-05 NOTE — TELEPHONE ENCOUNTER
Requested Prescriptions     Pending Prescriptions Disp Refills    traMADol (ULTRAM) 50 mg tablet 60 Tab 0     Sig: take 2 tablets by mouth every 6 hours if needed (DO NOT TAKE MORE THAN 8 TABLETS A DAY)       Last office visit was  2/24/17  Next office visit is     6/23/17    60 tabs prescribe 2/24/17  Please assist.

## 2017-04-05 NOTE — TELEPHONE ENCOUNTER
Requested Prescriptions     Pending Prescriptions Disp Refills    traMADol (ULTRAM) 50 mg tablet 60 Tab 0     Sig: take 2 tablets by mouth every 6 hours if needed (DO NOT TAKE MORE THAN 8 TABLETS A DAY)

## 2017-04-21 ENCOUNTER — HOSPITAL ENCOUNTER (OUTPATIENT)
Dept: LAB | Age: 77
Discharge: HOME | End: 2017-04-21
Payer: MEDICARE

## 2017-04-21 DIAGNOSIS — E78.5 DYSLIPIDEMIA: ICD-10-CM

## 2017-04-21 DIAGNOSIS — I10 HTN (HYPERTENSION), BENIGN: ICD-10-CM

## 2017-04-21 DIAGNOSIS — E11.40 TYPE 2 DIABETES MELLITUS WITH DIABETIC NEUROPATHY, WITHOUT LONG-TERM CURRENT USE OF INSULIN (HCC): ICD-10-CM

## 2017-04-21 LAB
ALBUMIN SERPL BCP-MCNC: 4 G/DL (ref 3.4–5)
ALBUMIN/GLOB SERPL: 1.3 {RATIO} (ref 0.8–1.7)
ALP SERPL-CCNC: 116 U/L (ref 45–117)
ALT SERPL-CCNC: 29 U/L (ref 16–61)
ANION GAP BLD CALC-SCNC: 10 MMOL/L (ref 3–18)
AST SERPL W P-5'-P-CCNC: 32 U/L (ref 15–37)
BILIRUB SERPL-MCNC: 0.8 MG/DL (ref 0.2–1)
BUN SERPL-MCNC: 11 MG/DL (ref 7–18)
BUN/CREAT SERPL: 8 (ref 12–20)
CALCIUM SERPL-MCNC: 8.7 MG/DL (ref 8.5–10.1)
CHLORIDE SERPL-SCNC: 102 MMOL/L (ref 100–108)
CHOLEST SERPL-MCNC: 108 MG/DL
CO2 SERPL-SCNC: 30 MMOL/L (ref 21–32)
CREAT SERPL-MCNC: 1.38 MG/DL (ref 0.6–1.3)
GLOBULIN SER CALC-MCNC: 3.2 G/DL (ref 2–4)
GLUCOSE SERPL-MCNC: 117 MG/DL (ref 74–99)
HBA1C MFR BLD: 6.2 % (ref 4.2–5.6)
HDLC SERPL-MCNC: 39 MG/DL (ref 40–60)
HDLC SERPL: 2.8 {RATIO} (ref 0–5)
LDLC SERPL CALC-MCNC: 41.6 MG/DL (ref 0–100)
LIPID PROFILE,FLP: ABNORMAL
POTASSIUM SERPL-SCNC: 3.8 MMOL/L (ref 3.5–5.5)
PROT SERPL-MCNC: 7.2 G/DL (ref 6.4–8.2)
SODIUM SERPL-SCNC: 142 MMOL/L (ref 136–145)
TRIGL SERPL-MCNC: 137 MG/DL (ref ?–150)
VLDLC SERPL CALC-MCNC: 27.4 MG/DL

## 2017-04-21 PROCEDURE — 80061 LIPID PANEL: CPT | Performed by: INTERNAL MEDICINE

## 2017-04-21 PROCEDURE — 36415 COLL VENOUS BLD VENIPUNCTURE: CPT | Performed by: INTERNAL MEDICINE

## 2017-04-21 PROCEDURE — 80053 COMPREHEN METABOLIC PANEL: CPT | Performed by: INTERNAL MEDICINE

## 2017-04-21 PROCEDURE — 83036 HEMOGLOBIN GLYCOSYLATED A1C: CPT | Performed by: INTERNAL MEDICINE

## 2017-04-28 ENCOUNTER — OFFICE VISIT (OUTPATIENT)
Dept: INTERNAL MEDICINE CLINIC | Age: 77
End: 2017-04-28

## 2017-04-28 VITALS
HEIGHT: 68 IN | WEIGHT: 249 LBS | SYSTOLIC BLOOD PRESSURE: 156 MMHG | TEMPERATURE: 97.8 F | DIASTOLIC BLOOD PRESSURE: 87 MMHG | BODY MASS INDEX: 37.74 KG/M2 | HEART RATE: 55 BPM | OXYGEN SATURATION: 96 % | RESPIRATION RATE: 18 BRPM

## 2017-04-28 DIAGNOSIS — E78.5 DYSLIPIDEMIA: ICD-10-CM

## 2017-04-28 DIAGNOSIS — E11.40 TYPE 2 DIABETES MELLITUS WITH DIABETIC NEUROPATHY, WITHOUT LONG-TERM CURRENT USE OF INSULIN (HCC): Primary | ICD-10-CM

## 2017-04-28 DIAGNOSIS — F02.80 LATE ONSET ALZHEIMER'S DISEASE WITHOUT BEHAVIORAL DISTURBANCE (HCC): ICD-10-CM

## 2017-04-28 DIAGNOSIS — G30.1 LATE ONSET ALZHEIMER'S DISEASE WITHOUT BEHAVIORAL DISTURBANCE (HCC): ICD-10-CM

## 2017-04-28 DIAGNOSIS — Z23 ENCOUNTER FOR IMMUNIZATION: ICD-10-CM

## 2017-04-28 DIAGNOSIS — I10 HTN (HYPERTENSION), BENIGN: ICD-10-CM

## 2017-04-28 NOTE — MR AVS SNAPSHOT
Visit Information Date & Time Provider Department Dept. Phone Encounter #  
 4/28/2017 10:30 AM Milbert Fabry, MD Internists at [facility] 8 [id number] Follow-up Instructions Return in about 4 months (around 8/28/2017) for labs 1 week before. Your Appointments 6/16/2017  8:30 AM  
LAB with Milbert Fabry, MD  
Internists at [facility] (--) Appt Note: 4 month follow with labs [address] [id number] [phone]  
  
   
 [address] [phone]  
  
    
 6/23/2017 10:30 AM  
Office Visit with Milbert Fabry, MD  
Internists at [facility] (--) Appt Note: 4 month follow with labs [address] [id number] [phone]  
  
   
 [address] [place] [address],[facility] [address] [phone] Upcoming Health Maintenance Date Due DTaP/Tdap/Td series (1 - Tdap) 6/16/1961 Pneumococcal 65+ High/Highest Risk (2 of 2 - PCV13) 10/10/2014 COLONOSCOPY 1/22/2017 GLAUCOMA SCREENING Q2Y 3/24/2017 EYE EXAM RETINAL OR DILATED Q1 5/4/2017* FOOT EXAM Q1 5/5/2017* MICROALBUMIN Q1 10/18/2017 HEMOGLOBIN A1C Q6M 10/21/2017 MEDICARE YEARLY EXAM 2/25/2018 LIPID PANEL Q1 4/21/2018 *Topic was postponed. The date shown is not the original due date. Allergies as of 4/28/2017  Review Complete On: 4/28/2017 By: Milbert Fabry, MD  
  
 Severity Noted Reaction Type Reactions Chlorhexidine Towelette  11/04/2014    Itching Current Immunizations  Reviewed on 10/21/2016 Name Date Influenza Vaccine 10/23/2014 Influenza Vaccine (Quad) PF 10/21/2016, 10/12/2015 Influenza Vaccine PF 10/10/2013 Pneumococcal Polysaccharide (PPSV-23) 10/10/2013 Not reviewed this visit You Were Diagnosed With   
  
 Codes Comments  Type 2 diabetes mellitus with diabetic neuropathy, without long-term current use of insulin ([facility]    -  Primary ICD-10-CM: E11.40 ICD-9-CM: 250.60, 357.2 HTN (hypertension), benign     ICD-10-CM: I10 
ICD-9-CM: 401.1 Dyslipidemia     ICD-10-CM: E78.5 ICD-9-CM: 272.4 Late onset Alzheimer's disease without behavioral disturbance     ICD-10-CM: G30.1, F02.80 ICD-9-CM: 331.0, 294.10 Vitals BP Pulse Temp Resp Height(growth percentile) Weight(growth percentile) 156/87 (BP 1 Location: Left arm, BP Patient Position: Sitting) (!) 55 97.8 °F (36.6 °C) (Oral) 18 5' 8\" (1.727 m) 249 lb (112.9 kg) SpO2 BMI Smoking Status 96% 37.86 kg/m2 Former Smoker Vitals History BMI and BSA Data Body Mass Index Body Surface Area  
 37.86 kg/m 2 2.33 m 2 Preferred Pharmacy Pharmacy Name Phone [id number] Meenakshi Raymond, [address] [place] [address],[address] Floor [facility] [phone] Your Updated Medication List  
  
   
This list is accurate as of: 4/28/17 11:14 AM.  Always use your most recent med list.  
  
  
  
  
 ALPRAZolam 0.25 mg tablet Commonly known as:  Blenda Browner Take 1 Tab by mouth three (3) times daily as needed for Anxiety. Max Daily Amount: 0.75 mg. amLODIPine 5 mg tablet Commonly known as:  NORVASC  
take 1 tablet by mouth once daily  
  
 aspirin 81 mg chewable tablet Take 1 Tab by mouth daily. cholecalciferol 1,000 unit tablet Commonly known as:  VITAMIN D3 Take 1,000 Units by mouth daily. donepezil 23 mg film coated tablet Commonly known as:  ARICEPT  
  
 escitalopram oxalate 10 mg tablet Commonly known as:  LEXAPRO  
take 1 tablet by mouth once daily  
  
 furosemide 20 mg tablet Commonly known as:  LASIX Take 2 Tabs by mouth daily as needed (for leg swelling). levETIRAcetam 250 mg tablet Commonly known as:  KEPPRA Take 1 Tab by mouth two (2) times a day. memantine 10 mg tablet Commonly known as:  NAMENDA  
take 1 tablet by mouth twice a day metoprolol succinate 50 mg XL tablet Commonly known as:  TOPROL-XL  
take 1 tablet by mouth once daily  
  
 multivitamin, tx-iron-ca-min 9 mg iron-400 mcg Tab tablet Commonly known as:  THERA-M w/ IRON Take 1 Tab by mouth daily. * OTHER Compound Cream Gabapentin 6%, Baclofen 2%, Cyclobenzaprine 2%, Lidocaine 2%, Flurbiprofen 10% with Ketamine 10% Apply 1-2 grams (1-2 pumps) to affected area 3-4 times 120grams/2 refills * OTHER Check CBC, CMP, Mg in 5 days, results to PCP immediately, Diagnosis- HTN  
  
 pantoprazole 40 mg tablet Commonly known as:  PROTONIX Take 1 Tab by mouth daily. QUEtiapine 50 mg tablet Commonly known as:  SEROquel  
take 1 tablet by mouth once daily at bedtime  
  
 rosuvastatin 5 mg tablet Commonly known as:  CRESTOR  
take 1 tablet by mouth at bedtime if needed  
  
 traMADol 50 mg tablet Commonly known as:  ULTRAM  
take 2 tablets by mouth every 6 hours if needed (DO NOT TAKE MORE THAN 8 TABLETS A DAY) * Notice: This list has 2 medication(s) that are the same as other medications prescribed for you. Read the directions carefully, and ask your doctor or other care provider to review them with you. Follow-up Instructions Return in about 4 months (around 8/28/2017) for labs 1 week before. Patient Instructions Rash: Care Instructions Your Care Instructions A rash is any irritation or inflammation of the skin. Rashes have many possible causes, including allergy, infection, illness, heat, and emotional stress. Follow-up care is a key part of your treatment and safety. Be sure to make and go to all appointments, and call your doctor if you are having problems. Its also a good idea to know your test results and keep a list of the medicines you take. How can you care for yourself at home? · Wash the area with water only. Soap can make dryness and itching worse. Pat dry. · Put cold, wet cloths on the rash to reduce itching. · Keep cool, and stay out of the sun. · Leave the rash open to the air as much of the time as possible. · Sometimes petroleum jelly (Vaseline) can help relieve the discomfort caused by a rash. A moisturizing lotion, such as Cetaphil, also may help. Calamine lotion may help for rashes caused by contact with something (such as a plant or soap) that irritated the skin. Use it 3 or 4 times a day. · If your doctor prescribed a cream, use it as directed. If your doctor prescribed medicine, take it exactly as directed. · If your rash itches so badly that it interferes with your normal activities, take an over-the-counter antihistamine, such as diphenhydramine (Benadryl) or loratadine (Claritin). Read and follow all instructions on the label. When should you call for help? Call your doctor now or seek immediate medical care if: 
· You have signs of infection, such as: 
¨ Increased pain, swelling, warmth, or redness. ¨ Red streaks leading from the area. ¨ Pus draining from the area. ¨ A fever. · You have joint pain along with the rash. Watch closely for changes in your health, and be sure to contact your doctor if: 
· Your rash is changing or getting worse. For example, call if you have pain along with the rash, the rash is spreading, or you have new blisters. · You do not get better after 1 week. Where can you learn more? Go to http://christie-brigid.info/. Enter P797 in the search box to learn more about \"Rash: Care Instructions. \" Current as of: October 13, 2016 Content Version: 11.2 © 8536-7675 [employer]. Care instructions adapted under license by [employer] (which disclaims liability or warranty for this information).  If you have questions about a medical condition or this instruction, always ask your healthcare professional. [address] any warranty or liability for your use of this information. Introducing [facility]! [employer] introduces Ule patient portal. Now you can access parts of your medical record, email your doctor's office, and request medication refills online. 1. In your internet browser, go to [email or web address] [email or web address] 2. Click on the First Time User? Click Here link in the Sign In box. You will see the New Member Sign Up page. 3. Enter your Ule Access Code exactly as it appears below. You will not need to use this code after youve completed the sign-up process. If you do not sign up before the expiration date, you must request a new code. · Ule Access Code: [email or web address] Expires: 5/9/2017  1:11 PM 
 
4. Enter the last four digits of your Social Security Number (xxxx) and Date of Birth (mm/dd/yyyy) as indicated and click Submit. You will be taken to the next sign-up page. 5. Create a Ule ID. This will be your Ule login ID and cannot be changed, so think of one that is secure and easy to remember. 6. Create a Ule password. You can change your password at any time. 7. Enter your Password Reset Question and Answer. This can be used at a later time if you forget your password. 8. Enter your e-mail address. You will receive e-mail notification when new information is available in [address] 9. Click Sign Up. You can now view and download portions of your medical record. 10. Click the Download Summary menu link to download a portable copy of your medical information. If you have questions, please visit the Frequently Asked Questions section of the Ule website. Remember, Ule is NOT to be used for urgent needs. For medical emergencies, dial 911. Now available from your iPhone and Android! Please provide this summary of care documentation to your next provider. Your primary care clinician is listed as PAOLA DERAS.  If you have any questions after today's visit, please call [phone].

## 2017-04-28 NOTE — PROGRESS NOTES
Subjective:       Chief Complaint  The patient presents for follow up of diabetes, hypertension and high cholesterol. JOE De Jesus Sr. is a 68 y.o. male seen for follow up of diabetes. Cheo has hypertension and hyperlipidemia. Diabetes well controlled, no significant medication side effects noted, on metformin, hypertension well controlled, no significant medication side effects noted, on Norvasc, pt has no proteinuria to be on ACE-I currently , hyperlipidemia well controlled, no significant medication side effects noted, on Crestor 5 mg    Diet and Lifestyle: not attempting to follow a low fat, low cholesterol diet, does not rigorously follow a diabetic diet, sedentary    Home BP Monitoring: is not measured at home. Diabetic Review of Systems - home glucose monitoring: is performed sporadically, should be 1x/day. Other symptoms and concerns: pt's Dementia continues to progress even  on Namenda and Aricept. His behavioral problems and difficulty with insomnia has improved on Seroquel. Anxiety remains stable on Xanax, Lexapro and Seroquel. Discussed the patient's BMI with him. The BMI follow up plan is as follows: BMI is out of normal parameters and plan is as follows: I have counseled this patient on diet and exercise regimens. Current Outpatient Prescriptions   Medication Sig    traMADol (ULTRAM) 50 mg tablet take 2 tablets by mouth every 6 hours if needed (DO NOT TAKE MORE THAN 8 TABLETS A DAY)    ALPRAZolam (XANAX) 0.25 mg tablet Take 1 Tab by mouth three (3) times daily as needed for Anxiety. Max Daily Amount: 0.75 mg.  pantoprazole (PROTONIX) 40 mg tablet Take 1 Tab by mouth daily.     rosuvastatin (CRESTOR) 5 mg tablet take 1 tablet by mouth at bedtime if needed    memantine (NAMENDA) 10 mg tablet take 1 tablet by mouth twice a day    metoprolol succinate (TOPROL-XL) 50 mg XL tablet take 1 tablet by mouth once daily    escitalopram oxalate (LEXAPRO) 10 mg tablet take 1 tablet by mouth once daily    QUEtiapine (SEROQUEL) 50 mg tablet take 1 tablet by mouth once daily at bedtime    amLODIPine (NORVASC) 5 mg tablet take 1 tablet by mouth once daily    levETIRAcetam (KEPPRA) 250 mg tablet Take 1 Tab by mouth two (2) times a day.  cholecalciferol (VITAMIN D3) 1,000 unit tablet Take 1,000 Units by mouth daily.  donepezil (ARICEPT) 23 mg film coated tablet     aspirin 81 mg chewable tablet Take 1 Tab by mouth daily.  OTHER Check CBC, CMP, Mg in 5 days, results to PCP immediately, Diagnosis- HTN    furosemide (LASIX) 20 mg tablet Take 2 Tabs by mouth daily as needed (for leg swelling).  multivitamin, tx-iron-ca-min (THERA-M W/ IRON) 9 mg iron-400 mcg tab tablet Take 1 Tab by mouth daily.  OTHER Compound Cream  Gabapentin 6%, Baclofen 2%, Cyclobenzaprine 2%, Lidocaine 2%, Flurbiprofen 10% with Ketamine 10%  Apply 1-2 grams (1-2 pumps) to affected area 3-4 times  120grams/2 refills     No current facility-administered medications for this visit.               Review of Systems  Respiratory: negative for dyspnea on exertion  Cardiovascular: negative for chest pain    Objective:     Visit Vitals    /87 (BP 1 Location: Left arm, BP Patient Position: Sitting)    Pulse (!) 55    Temp 97.8 °F (36.6 °C) (Oral)    Resp 18    Ht 5' 8\" (1.727 m)    Wt 249 lb (112.9 kg)    SpO2 96%    BMI 37.86 kg/m2        General appearance - alert, well appearing, and in no distress  Neck - supple, no significant adenopathy, carotids upstroke normal bilaterally, no bruits  Chest - clear to auscultation, no wheezes, rales or rhonchi, symmetric air entry  Heart - normal rate, regular rhythm, normal S1, S2, no murmurs, rubs, clicks or gallops  Neurological - paresthesias in feet   Extremities - peripheral pulses normal, no pedal edema, no clubbing or cyanosis  Skin - normal coloration and turgor, no rashes, no suspicious skin lesions noted      Labs:   Lab Results   Component Value Date/Time    Hemoglobin A1c 6.2 04/21/2017 08:54 AM    Hemoglobin A1c 6.0 02/17/2017 08:36 AM    Hemoglobin A1c 5.8 10/18/2016 10:53 AM    Glucose 117 04/21/2017 08:54 AM    Glucose (POC) 122 04/01/2016 11:16 AM    Microalbumin/Creat ratio (mg/g creat)  10/18/2016 10:53 AM     Cannot calculate ratio due to micro albumin result outside reportable range. Microalbumin,urine random <0.13 10/18/2016 10:53 AM    LDL, calculated 41.6 04/21/2017 08:54 AM    Creatinine 1.38 04/21/2017 08:54 AM      Lab Results   Component Value Date/Time    Cholesterol, total 108 04/21/2017 08:54 AM    HDL Cholesterol 39 04/21/2017 08:54 AM    LDL, calculated 41.6 04/21/2017 08:54 AM    Triglyceride 137 04/21/2017 08:54 AM    CHOL/HDL Ratio 2.8 04/21/2017 08:54 AM     Lab Results   Component Value Date/Time    ALT (SGPT) 29 04/21/2017 08:54 AM    AST (SGOT) 32 04/21/2017 08:54 AM    Alk. phosphatase 116 04/21/2017 08:54 AM    Bilirubin, total 0.8 04/21/2017 08:54 AM     Lab Results   Component Value Date/Time    GFR est AA >60 04/21/2017 08:54 AM    GFR est non-AA 50 04/21/2017 08:54 AM    Creatinine 1.38 04/21/2017 08:54 AM    BUN 11 04/21/2017 08:54 AM    Sodium 142 04/21/2017 08:54 AM    Potassium 3.8 04/21/2017 08:54 AM    Chloride 102 04/21/2017 08:54 AM    CO2 30 04/21/2017 08:54 AM            Assessment / Plan     Diabetes well controlled, on metformin  Hypertension well controlled, on current meds, consider changing to or adding an ARB if any change in meds or BP  Hyperlipidemia well controlled, on crestor. ICD-10-CM ICD-9-CM    1. Type 2 diabetes mellitus with diabetic neuropathy, without long-term current use of insulin (HCC) E11.40 250.60 HEMOGLOBIN A1C W/O EAG     357.2 MICROALBUMIN, UR, RAND W/ MICROALBUMIN/CREA RATIO   2. HTN (hypertension), benign F36 219.3 METABOLIC PANEL, COMPREHENSIVE   3.  Dyslipidemia E78.5 272.4 LIPID PANEL   4. Late onset Alzheimer's disease without behavioral disturbance G30.1 331.0 Continues to progress on maximal therapy. Wife is currently able to take care of him at home. F02.80 294.10    5. Encounter for immunization Z23 V03.89 TETANUS, DIPHTHERIA TOXOIDS AND ACELLULAR PERTUSSIS VACCINE (TDAP), IN INDIVIDS. >=7, IM               Diabetic issues reviewed with him: diabetic diet discussed in detail, and low cholesterol diet, weight control and daily exercise discussed. Follow-up Disposition:  Return in about 4 months (around 8/28/2017) for labs 1 week before. Reviewed plan of care. Patient has provided input and agrees with goals.

## 2017-04-28 NOTE — PATIENT INSTRUCTIONS

## 2017-04-28 NOTE — PROGRESS NOTES
Patient is in the office today for a Tdap and follow up on rash. 1. Have you been to the ER, urgent care clinic since your last visit? Hospitalized since your last visit? yes, [facility].     2. Have you seen or consulted any other health care providers outside of the [facility] since your last visit? Include any pap smears or colon screening. No      Verbal order read back per Dr. Betty Montalvo Tdap vaccine. Patient received Tdap vaccine in left deltoid. Patient tolerated well and left without complaints. Patient received Tdap VIS.

## 2017-05-04 RX ORDER — MEMANTINE HYDROCHLORIDE 10 MG/1
TABLET ORAL
Qty: 60 TAB | Refills: 3 | Status: SHIPPED | OUTPATIENT
Start: 2017-05-04 | End: 2018-06-01 | Stop reason: SDUPTHER

## 2017-05-04 RX ORDER — QUETIAPINE FUMARATE 50 MG/1
TABLET, FILM COATED ORAL
Qty: 30 TAB | Refills: 3 | Status: SHIPPED | OUTPATIENT
Start: 2017-05-04 | End: 2017-09-11 | Stop reason: SDUPTHER

## 2017-05-19 ENCOUNTER — HOSPITAL ENCOUNTER (OUTPATIENT)
Dept: NEUROLOGY | Age: 77
Discharge: HOME | End: 2017-05-19
Attending: PSYCHIATRY & NEUROLOGY
Payer: MEDICARE

## 2017-05-19 ENCOUNTER — HOSPITAL ENCOUNTER (OUTPATIENT)
Dept: MRI IMAGING | Age: 77
Discharge: HOME | End: 2017-05-19
Attending: PSYCHIATRY & NEUROLOGY
Payer: MEDICARE

## 2017-05-19 DIAGNOSIS — G40.909 EPILEPSY (HCC): ICD-10-CM

## 2017-05-19 PROCEDURE — 95819 EEG AWAKE AND ASLEEP: CPT

## 2017-05-19 PROCEDURE — 70551 MRI BRAIN STEM W/O DYE: CPT

## 2017-05-23 NOTE — OP NOTES
[address]    Name:  Emigdio Cortes  MR#:  [id number]  :  1940  Account #:  [de-identified]  Date of Adm:  2017  Date of Surgery:      ELECTROENCEPHALOGRAM NUMBER: Pita . REFERRING PHYSICIAN: Dr. Momo Amin: This is an apparently wakeful EEG on this 49-year-old  patient being evaluated for possible seizures. He has had a  progressive decline in his memory. He has also been having frequent  falls. He has momentary  blank stare on his face. MEDICATIONS INCLUDE  1. Namenda. 2. Seroquel. 3. Ultram.  4. Xanax. 5. Protonix. 6. Crestor. 7. Metoprolol. 8. Lexapro. 9. Norvasc. 10. Keppra. 11. Aricept. 12. Aspirin. 13. Lasix. ELECTROENCEPHALOGRAM REPORT: The predominant wakeful  background consists of 15 to 20 microvolts, sinusoidal and  symmetrical, 10-11 Hz waves which attenuate well with eye opening. Bifrontal 3 to 5 microvolts, 16-20 Hz activity is identified, which is  symmetrical in its occurrence. Step-flash photic stimulation was performed that caused symmetrical  driving between 3 and 18 flashes per second. IMPRESSION: This was a normal wakeful electroencephalogram. No  epileptiform or focal abnormality was identified.         MD PEARL Saucedo / Danny Carnes  D:  2017   16:54  T:  2017   00:08  Job #:  [id number]

## 2017-06-09 RX ORDER — PANTOPRAZOLE SODIUM 40 MG/1
TABLET, DELAYED RELEASE ORAL
Qty: 30 TAB | Refills: 2 | Status: SHIPPED | OUTPATIENT
Start: 2017-06-09 | End: 2017-09-11 | Stop reason: SDUPTHER

## 2017-06-09 RX ORDER — ROSUVASTATIN CALCIUM 5 MG/1
TABLET, COATED ORAL
Qty: 30 TAB | Refills: 4 | Status: SHIPPED | OUTPATIENT
Start: 2017-06-09 | End: 2017-12-02 | Stop reason: SDUPTHER

## 2017-07-05 RX ORDER — ESCITALOPRAM OXALATE 10 MG/1
TABLET ORAL
Qty: 30 TAB | Refills: 5 | Status: SHIPPED | OUTPATIENT
Start: 2017-07-05 | End: 2018-03-11 | Stop reason: SDUPTHER

## 2017-07-05 RX ORDER — METOPROLOL SUCCINATE 50 MG/1
TABLET, EXTENDED RELEASE ORAL
Qty: 30 TAB | Refills: 5 | Status: SHIPPED | OUTPATIENT
Start: 2017-07-05 | End: 2018-04-02 | Stop reason: SDUPTHER

## 2017-08-11 RX ORDER — TRAMADOL HYDROCHLORIDE 50 MG/1
TABLET ORAL
Qty: 60 TAB | Refills: 3 | Status: SHIPPED | OUTPATIENT
Start: 2017-08-11 | End: 2017-12-01 | Stop reason: SDUPTHER

## 2017-08-14 ENCOUNTER — TELEPHONE (OUTPATIENT)
Dept: INTERNAL MEDICINE CLINIC | Age: 77
End: 2017-08-14

## 2017-08-16 DIAGNOSIS — F41.9 ANXIETY: ICD-10-CM

## 2017-08-16 RX ORDER — ALPRAZOLAM 0.25 MG/1
TABLET ORAL
Qty: 90 TAB | Refills: 2 | Status: SHIPPED | OUTPATIENT
Start: 2017-08-16 | End: 2017-12-06 | Stop reason: SDUPTHER

## 2017-08-25 ENCOUNTER — HOSPITAL ENCOUNTER (OUTPATIENT)
Dept: LAB | Age: 77
Discharge: HOME | End: 2017-08-25
Payer: MEDICARE

## 2017-08-25 DIAGNOSIS — I10 HTN (HYPERTENSION), BENIGN: ICD-10-CM

## 2017-08-25 DIAGNOSIS — E78.5 DYSLIPIDEMIA: ICD-10-CM

## 2017-08-25 DIAGNOSIS — E11.40 TYPE 2 DIABETES MELLITUS WITH DIABETIC NEUROPATHY, WITHOUT LONG-TERM CURRENT USE OF INSULIN (HCC): ICD-10-CM

## 2017-08-25 LAB
ALBUMIN SERPL-MCNC: 4 G/DL (ref 3.4–5)
ALBUMIN/GLOB SERPL: 1.2 {RATIO} (ref 0.8–1.7)
ALP SERPL-CCNC: 110 U/L (ref 45–117)
ALT SERPL-CCNC: 31 U/L (ref 16–61)
ANION GAP SERPL CALC-SCNC: 7 MMOL/L (ref 3–18)
AST SERPL-CCNC: 30 U/L (ref 15–37)
BILIRUB SERPL-MCNC: 1.1 MG/DL (ref 0.2–1)
BUN SERPL-MCNC: 12 MG/DL (ref 7–18)
BUN/CREAT SERPL: 10 (ref 12–20)
CALCIUM SERPL-MCNC: 8.2 MG/DL (ref 8.5–10.1)
CHLORIDE SERPL-SCNC: 103 MMOL/L (ref 100–108)
CHOLEST SERPL-MCNC: 116 MG/DL
CO2 SERPL-SCNC: 31 MMOL/L (ref 21–32)
CREAT SERPL-MCNC: 1.24 MG/DL (ref 0.6–1.3)
GLOBULIN SER CALC-MCNC: 3.3 G/DL (ref 2–4)
GLUCOSE SERPL-MCNC: 94 MG/DL (ref 74–99)
HBA1C MFR BLD: 6 % (ref 4.2–5.6)
HDLC SERPL-MCNC: 41 MG/DL (ref 40–60)
HDLC SERPL: 2.8 {RATIO} (ref 0–5)
LDLC SERPL CALC-MCNC: 42 MG/DL (ref 0–100)
LIPID PROFILE,FLP: ABNORMAL
POTASSIUM SERPL-SCNC: 3.5 MMOL/L (ref 3.5–5.5)
PROT SERPL-MCNC: 7.3 G/DL (ref 6.4–8.2)
SODIUM SERPL-SCNC: 141 MMOL/L (ref 136–145)
TRIGL SERPL-MCNC: 165 MG/DL (ref ?–150)
VLDLC SERPL CALC-MCNC: 33 MG/DL

## 2017-08-25 PROCEDURE — 80053 COMPREHEN METABOLIC PANEL: CPT | Performed by: INTERNAL MEDICINE

## 2017-08-25 PROCEDURE — 83036 HEMOGLOBIN GLYCOSYLATED A1C: CPT | Performed by: INTERNAL MEDICINE

## 2017-08-25 PROCEDURE — 82043 UR ALBUMIN QUANTITATIVE: CPT | Performed by: INTERNAL MEDICINE

## 2017-08-25 PROCEDURE — 36415 COLL VENOUS BLD VENIPUNCTURE: CPT | Performed by: INTERNAL MEDICINE

## 2017-08-25 PROCEDURE — 80061 LIPID PANEL: CPT | Performed by: INTERNAL MEDICINE

## 2017-08-26 LAB
CREAT UR-MCNC: 118.54 MG/DL (ref 30–125)
MICROALBUMIN UR-MCNC: 4.9 MG/DL (ref 0–3)
MICROALBUMIN/CREAT UR-RTO: 41 MG/G (ref 0–30)

## 2017-09-01 ENCOUNTER — OFFICE VISIT (OUTPATIENT)
Dept: INTERNAL MEDICINE CLINIC | Age: 77
End: 2017-09-01

## 2017-09-01 VITALS
WEIGHT: 255 LBS | RESPIRATION RATE: 20 BRPM | TEMPERATURE: 98.8 F | SYSTOLIC BLOOD PRESSURE: 173 MMHG | OXYGEN SATURATION: 95 % | DIASTOLIC BLOOD PRESSURE: 98 MMHG | HEIGHT: 68 IN | HEART RATE: 87 BPM | BODY MASS INDEX: 38.65 KG/M2

## 2017-09-01 DIAGNOSIS — G63 POLYNEUROPATHY ASSOCIATED WITH UNDERLYING DISEASE (HCC): ICD-10-CM

## 2017-09-01 DIAGNOSIS — E11.40 TYPE 2 DIABETES MELLITUS WITH DIABETIC NEUROPATHY, WITHOUT LONG-TERM CURRENT USE OF INSULIN (HCC): Primary | ICD-10-CM

## 2017-09-01 DIAGNOSIS — G30.1 LATE ONSET ALZHEIMER'S DISEASE WITH BEHAVIORAL DISTURBANCE (HCC): ICD-10-CM

## 2017-09-01 DIAGNOSIS — E78.00 HIGH CHOLESTEROL: ICD-10-CM

## 2017-09-01 DIAGNOSIS — F02.818 LATE ONSET ALZHEIMER'S DISEASE WITH BEHAVIORAL DISTURBANCE (HCC): ICD-10-CM

## 2017-09-01 DIAGNOSIS — I10 HTN (HYPERTENSION), BENIGN: ICD-10-CM

## 2017-09-01 RX ORDER — CLOTRIMAZOLE AND BETAMETHASONE DIPROPIONATE 10; .64 MG/G; MG/G
CREAM TOPICAL
Qty: 45 G | Refills: 2 | Status: SHIPPED | OUTPATIENT
Start: 2017-09-01 | End: 2018-02-12

## 2017-09-01 RX ORDER — KETOCONAZOLE 20 MG/G
CREAM TOPICAL DAILY
COMMUNITY
End: 2018-02-12

## 2017-09-01 RX ORDER — VALSARTAN 160 MG/1
160 TABLET ORAL DAILY
Qty: 30 TAB | Refills: 5 | Status: SHIPPED | OUTPATIENT
Start: 2017-09-01 | End: 2018-03-01 | Stop reason: SDUPTHER

## 2017-09-01 NOTE — MR AVS SNAPSHOT
Visit Information Date & Time Provider Department Dept. Phone Encounter #  
 9/1/2017 10:30 AM Emanuel Arndt MD Internists at [facility] [phone] Follow-up Instructions Return in about 4 months (around 1/1/2018) for labs 1 week before. Upcoming Health Maintenance Date Due Pneumococcal 65+ High/Highest Risk (2 of 2 - PCV13) 10/10/2014 EYE EXAM RETINAL OR DILATED Q1 3/24/2016 FOOT EXAM Q1 6/15/2016 COLONOSCOPY 1/22/2017 GLAUCOMA SCREENING Q2Y 3/24/2017 INFLUENZA AGE 9 TO ADULT 8/1/2017 MEDICARE YEARLY EXAM 2/25/2018 HEMOGLOBIN A1C Q6M 2/25/2018 MICROALBUMIN Q1 8/25/2018 LIPID PANEL Q1 8/25/2018 DTaP/Tdap/Td series (2 - Td) 4/28/2027 Allergies as of 9/1/2017  Review Complete On: 9/1/2017 By: Emanuel Arndt MD  
  
 Severity Noted Reaction Type Reactions Chlorhexidine [place]  11/04/2014    Itching Current Immunizations  Reviewed on 4/28/2017 Name Date Influenza Vaccine 10/23/2014 Influenza Vaccine (Quad) PF 10/21/2016, 10/12/2015 Influenza Vaccine PF 10/10/2013 Pneumococcal Polysaccharide (PPSV-23) 10/10/2013 Tdap 4/28/2017 Not reviewed this visit You Were Diagnosed With   
  
 Codes Comments Type 2 diabetes mellitus with diabetic neuropathy, without long-term current use of insulin (HCC)    -  Primary ICD-10-CM: E11.40 ICD-9-CM: 250.60, 357.2 HTN (hypertension), benign     ICD-10-CM: I10 
ICD-9-CM: 401.1 High cholesterol     ICD-10-CM: E78.00 ICD-9-CM: 272.0 Vitals BP Pulse Temp Resp Height(growth percentile) Weight(growth percentile) (!) 173/98 (BP 1 Location: Left arm, BP Patient Position: Sitting) 87 98.8 °F (37.1 °C) (Oral) 20 5' 8\" (1.727 m) 255 lb (115.7 kg) SpO2 BMI Smoking Status 95% 38.77 kg/m2 Former Smoker Vitals History BMI and BSA Data Body Mass Index Body Surface Area 38.77 kg/m 2 2.36 m 2 Preferred Pharmacy Pharmacy Name Phone [facility]/PHARMACY #[id number] Carmel Bauer, [address] [facility] [facility] [phone] Your Updated Medication List  
  
   
This list is accurate as of: 9/1/17 11:17 AM.  Always use your most recent med list.  
  
  
  
  
 ALPRAZolam 0.25 mg tablet Commonly known as:  XANAX  
TAKE 1 TABLET BY MOUTH 3 TIMES DAILY AS NEEDED FOR ANXIETY. MAX DAILY AMOUNT IS 0.75MG (3 TABLETS) amLODIPine 5 mg tablet Commonly known as:  NORVASC  
take 1 tablet by mouth once daily  
  
 aspirin 81 mg chewable tablet Take 1 Tab by mouth daily. cholecalciferol 1,000 unit tablet Commonly known as:  VITAMIN D3 Take 1,000 Units by mouth daily. clotrimazole-betamethasone topical cream  
Commonly known as:  Jay Anton Apply pea size affected area BID for 1 week  
  
 donepezil 23 mg film coated tablet Commonly known as:  ARICEPT  
  
 escitalopram oxalate 10 mg tablet Commonly known as:  Jeison Rash TAKE 1 TABLET BY MOUTH ONCE DAILY  
  
 furosemide 20 mg tablet Commonly known as:  LASIX Take 2 Tabs by mouth daily as needed (for leg swelling). ketoconazole 2 % topical cream  
Commonly known as:  NIZORAL Apply  to affected area daily. levETIRAcetam 250 mg tablet Commonly known as:  KEPPRA Take 1 Tab by mouth two (2) times a day. memantine 10 mg tablet Commonly known as:  Jocelyn Dale TAKE 1 TABLET BY MOUTH TWICE DAILY  
  
 metoprolol succinate 50 mg XL tablet Commonly known as:  TOPROL-XL  
TAKE 1 TABLET BY MOUTH ONCE DAILY  
  
 multivitamin, tx-iron-ca-min 9 mg iron-400 mcg Tab tablet Commonly known as:  THERA-M w/ IRON Take 1 Tab by mouth daily. * OTHER Compound Cream Gabapentin 6%, Baclofen 2%, Cyclobenzaprine 2%, Lidocaine 2%, Flurbiprofen 10% with Ketamine 10% Apply 1-2 grams (1-2 pumps) to affected area 3-4 times 120grams/2 refills * OTHER Check CBC, CMP, Mg in 5 days, results to PCP immediately, Diagnosis- HTN  
  
 pantoprazole 40 mg tablet Commonly known as:  PROTONIX  
TAKE 1 TAB BY MOUTH DAILY. QUEtiapine 50 mg tablet Commonly known as:  SEROquel TAKE 1 TABLET BY MOUTH ONCE DAILY AT BEDTIME  
  
 rosuvastatin 5 mg tablet Commonly known as:  CRESTOR  
TAKE 1 TABLET BY MOUTH AT BEDTIME IF NEEDED  
  
 traMADol 50 mg tablet Commonly known as:  ULTRAM  
TAKE 2 TABLETS BY MOUTH EVERY 6 HOURS IF NEEDED(DO NOT TAKE MORE THAN 8 TABLETS A DAY)  
  
 valsartan 160 mg tablet Commonly known as:  DIOVAN Take 1 Tab by mouth daily. * Notice: This list has 2 medication(s) that are the same as other medications prescribed for you. Read the directions carefully, and ask your doctor or other care provider to review them with you. Prescriptions Sent to Pharmacy Refills  
 valsartan (DIOVAN) 160 mg tablet 5 Sig: Take 1 Tab by mouth daily. Class: Normal  
 Pharmacy: [facility]/pharmacy [address] [place] [address], [address] [place] [address],[address] [place] [address] Ph #: [phone] Route: Oral  
 clotrimazole-betamethasone (LOTRISONE) topical cream 2 Sig: Apply pea size affected area BID for 1 week Class: Normal  
 Pharmacy: [facility]/pharmacy [address] [place] [address], [facility],[address] [place] [address] Ph #: [phone] Follow-up Instructions Return in about 4 months (around 1/1/2018) for labs 1 week before. Patient Instructions DASH Diet: Care Instructions Your Care Instructions The DASH diet is an eating plan that can help lower your blood pressure. DASH stands for Dietary Approaches to Stop Hypertension. Hypertension is high blood pressure. The DASH diet focuses on eating foods that are high in calcium, potassium, and magnesium. These nutrients can lower blood pressure. The foods that are highest in these nutrients are fruits, vegetables, low-fat dairy products, nuts, seeds, and legumes.  But taking calcium, potassium, and magnesium supplements instead of eating foods that are high in those nutrients does not have the same effect. The DASH diet also includes whole grains, fish, and poultry. The DASH diet is one of several lifestyle changes your doctor may recommend to lower your high blood pressure. Your doctor may also want you to decrease the amount of sodium in your diet. Lowering sodium while following the DASH diet can lower blood pressure even further than just the DASH diet alone. Follow-up care is a key part of your treatment and safety. Be sure to make and go to all appointments, and call your doctor if you are having problems. It's also a good idea to know your test results and keep a list of the medicines you take. How can you care for yourself at home? Following the DASH diet · Eat 4 to 5 servings of fruit each day. A serving is 1 medium-sized piece of fruit, ½ cup chopped or canned fruit, 1/4 cup dried fruit, or 4 ounces (½ cup) of fruit juice. Choose fruit more often than fruit juice. · Eat 4 to 5 servings of vegetables each day. A serving is 1 cup of lettuce or raw leafy vegetables, ½ cup of chopped or cooked vegetables, or 4 ounces (½ cup) of vegetable juice. Choose vegetables more often than vegetable juice. · Get 2 to 3 servings of low-fat and fat-free dairy each day. A serving is 8 ounces of milk, 1 cup of yogurt, or 1 ½ ounces of cheese. · Eat 6 to 8 servings of grains each day. A serving is 1 slice of bread, 1 ounce of dry cereal, or ½ cup of cooked rice, pasta, or cooked cereal. Try to choose whole-grain products as much as possible. · Limit lean meat, poultry, and fish to 2 servings each day. A serving is 3 ounces, about the size of a deck of cards. · Eat 4 to 5 servings of nuts, seeds, and legumes (cooked dried beans, lentils, and split peas) each week. A serving is 1/3 cup of nuts, 2 tablespoons of seeds, or ½ cup of cooked beans or peas. · Limit fats and oils to 2 to 3 servings each day. A serving is 1 teaspoon of vegetable oil or 2 tablespoons of salad dressing. · Limit sweets and added sugars to 5 servings or less a week. A serving is 1 tablespoon jelly or jam, ½ cup sorbet, or 1 cup of lemonade. · Eat less than 2,300 milligrams (mg) of sodium a day. If you limit your sodium to 1,500 mg a day, you can lower your blood pressure even more. Tips for success · Start small. Do not try to make dramatic changes to your diet all at once. You might feel that you are missing out on your favorite foods and then be more likely to not follow the plan. Make small changes, and stick with them. Once those changes become habit, add a few more changes. · Try some of the following: ¨ Make it a goal to eat a fruit or vegetable at every meal and at snacks. This will make it easy to get the recommended amount of fruits and vegetables each day. ¨ Try yogurt topped with fruit and nuts for a snack or healthy dessert. ¨ Add lettuce, tomato, cucumber, and onion to sandwiches. ¨ Combine a ready-made pizza crust with low-fat mozzarella cheese and lots of vegetable toppings. Try using tomatoes, squash, spinach, broccoli, carrots, cauliflower, and onions. ¨ Have a variety of cut-up vegetables with a low-fat dip as an appetizer instead of chips and dip. ¨ Sprinkle sunflower seeds or chopped almonds over salads. Or try adding chopped walnuts or almonds to cooked vegetables. ¨ Try some vegetarian meals using beans and peas. Add garbanzo or kidney beans to salads. Make burritos and tacos with mashed guo beans or black beans. Where can you learn more? Go to http://christie-brigid.info/. Enter A651 in the search box to learn more about \"DASH Diet: Care Instructions. \" Current as of: April 3, 2017 Content Version: 11.3 © 8240-4660 [employer]. Care instructions adapted under license by "[employer] (which disclaims liability or warranty for this information).  If you have questions about a medical condition or this instruction, always ask your healthcare professional. [place] [address] any warranty or liability for your use of this information. Introducing [facility] SERVICES! Baldo Abdi introduces [employer] patient portal. Now you can access parts of your medical record, email your doctor's office, and request medication refills online. 1. In your internet browser, go to [email or web address] 2. Click on the First Time User? Click Here link in the Sign In box. You will see the New Member Sign Up page. 3. Enter your [employer] Access Code exactly as it appears below. You will not need to use this code after youve completed the sign-up process. If you do not sign up before the expiration date, you must request a new code. · [employer] Access Code: [email or web address] Expires: 11/30/2017 11:17 AM 
 
4. Enter the last four digits of your Social Security Number (xxxx) and Date of Birth (mm/dd/yyyy) as indicated and click Submit. You will be taken to the next sign-up page. 5. Create a [employer] ID. This will be your [employer] login ID and cannot be changed, so think of one that is secure and easy to remember. 6. Create a [employer] password. You can change your password at any time. 7. Enter your Password Reset Question and Answer. This can be used at a later time if you forget your password. 8. Enter your e-mail address. You will receive e-mail notification when new information is available in [address] 9. Click Sign Up. You can now view and download portions of your medical record. 10. Click the Download Summary menu link to download a portable copy of your medical information. If you have questions, please visit the Frequently Asked Questions section of the [employer] website. Remember, [employer] is NOT to be used for urgent needs. For medical emergencies, dial 911. Now available from your iPhone and Android! Please provide this summary of care documentation to your next provider. Your primary care clinician is listed as PAOLA DERAS. If you have any questions after today's visit, please call [phone].

## 2017-09-01 NOTE — PROGRESS NOTES
Subjective:       Chief Complaint  The patient presents for follow up of diabetes, hypertension and high cholesterol. HPI Jeannie Brunner Sr. is a 68 y.o. male seen for follow up of diabetes. Healso has hypertension and hyperlipidemia. Diabetes well controlled, no significant medication side effects noted, on metformin, hypertension well controlled, no significant medication side effects noted, on Norvasc, pt has no proteinuria to be on ACE-I currently , hyperlipidemia well controlled, no significant medication side effects noted, on Crestor 5 mg    Diet and Lifestyle: not attempting to follow a low fat, low cholesterol diet, does not rigorously follow a diabetic diet, sedentary    Home BP Monitoring: is not measured at home. Diabetic Review of Systems - home glucose monitoring: is performed sporadically, should be 1x/day. Other symptoms and concerns: pt's Dementia continues to progress even  on Namenda and Aricept. His behavioral problems and difficulty with insomnia has improved on Seroquel. Anxiety remains stable on Xanax, Lexapro and Seroquel. Pt's wife is currently coping with keeping him at home. 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 Pain is controlled on ultram. Have not yet done pain contract due to dementia. Discussed the patient's BMI with him. The BMI follow up plan is as follows: BMI is out of normal parameters and plan is as follows: I have counseled this patient on diet and exercise regimens. Current Outpatient Prescriptions   Medication Sig    ketoconazole (NIZORAL) 2 % topical cream Apply  to affected area daily.  valsartan (DIOVAN) 160 mg tablet Take 1 Tab by mouth daily.  clotrimazole-betamethasone (LOTRISONE) topical cream Apply pea size affected area BID for 1 week    ALPRAZolam (XANAX) 0.25 mg tablet TAKE 1 TABLET BY MOUTH 3 TIMES DAILY AS NEEDED FOR ANXIETY.  MAX DAILY AMOUNT IS 0.75MG (3 TABLETS)    traMADol (ULTRAM) 50 mg tablet TAKE 2 TABLETS BY MOUTH EVERY 6 HOURS IF NEEDED(DO NOT TAKE MORE THAN 8 TABLETS A DAY)    metoprolol succinate (TOPROL-XL) 50 mg XL tablet TAKE 1 TABLET BY MOUTH ONCE DAILY    escitalopram oxalate (LEXAPRO) 10 mg tablet TAKE 1 TABLET BY MOUTH ONCE DAILY    pantoprazole (PROTONIX) 40 mg tablet TAKE 1 TAB BY MOUTH DAILY.  rosuvastatin (CRESTOR) 5 mg tablet TAKE 1 TABLET BY MOUTH AT BEDTIME IF NEEDED    memantine (NAMENDA) 10 mg tablet TAKE 1 TABLET BY MOUTH TWICE DAILY    QUEtiapine (SEROQUEL) 50 mg tablet TAKE 1 TABLET BY MOUTH ONCE DAILY AT BEDTIME    amLODIPine (NORVASC) 5 mg tablet take 1 tablet by mouth once daily    levETIRAcetam (KEPPRA) 250 mg tablet Take 1 Tab by mouth two (2) times a day.  cholecalciferol (VITAMIN D3) 1,000 unit tablet Take 1,000 Units by mouth daily.  donepezil (ARICEPT) 23 mg film coated tablet     aspirin 81 mg chewable tablet Take 1 Tab by mouth daily.  OTHER Check CBC, CMP, Mg in 5 days, results to PCP immediately, Diagnosis- HTN    furosemide (LASIX) 20 mg tablet Take 2 Tabs by mouth daily as needed (for leg swelling).  multivitamin, tx-iron-ca-min (THERA-M W/ IRON) 9 mg iron-400 mcg tab tablet Take 1 Tab by mouth daily.  OTHER Compound Cream  Gabapentin 6%, Baclofen 2%, Cyclobenzaprine 2%, Lidocaine 2%, Flurbiprofen 10% with Ketamine 10%  Apply 1-2 grams (1-2 pumps) to affected area 3-4 times  120grams/2 refills     No current facility-administered medications for this visit.               Review of Systems  Respiratory: negative for dyspnea on exertion  Cardiovascular: negative for chest pain    Objective:     Visit Vitals    BP (!) 173/98 (BP 1 Location: Left arm, BP Patient Position: Sitting)    Pulse 87    Temp 98.8 °F (37.1 °C) (Oral)    Resp 20    Ht 5' 8\" (1.727 m)    Wt 255 lb (115.7 kg)    SpO2 95%    BMI 38.77 kg/m2        General appearance - alert, well appearing, and in no distress  Neck - supple, no significant adenopathy, carotids upstroke normal bilaterally, no bruits  Chest - clear to auscultation, no wheezes, rales or rhonchi, symmetric air entry  Heart - normal rate, regular rhythm, normal S1, S2, no murmurs, rubs, clicks or gallops  Neurological - paresthesias in feet   Extremities - peripheral pulses normal, no pedal edema, no clubbing or cyanosis  Skin - normal coloration and turgor, no rashes, no suspicious skin lesions noted      Labs:   Lab Results   Component Value Date/Time    Hemoglobin A1c 6.0 08/25/2017 08:35 AM    Hemoglobin A1c 6.2 04/21/2017 08:54 AM    Hemoglobin A1c 6.0 02/17/2017 08:36 AM    Glucose 94 08/25/2017 08:35 AM    Glucose (POC) 122 04/01/2016 11:16 AM    Microalbumin/Creat ratio (mg/g creat) 41 08/25/2017 08:35 AM    Microalbumin,urine random 4.90 08/25/2017 08:35 AM    LDL, calculated 42 08/25/2017 08:35 AM    Creatinine 1.24 08/25/2017 08:35 AM      Lab Results   Component Value Date/Time    Cholesterol, total 116 08/25/2017 08:35 AM    HDL Cholesterol 41 08/25/2017 08:35 AM    LDL, calculated 42 08/25/2017 08:35 AM    Triglyceride 165 08/25/2017 08:35 AM    CHOL/HDL Ratio 2.8 08/25/2017 08:35 AM     Lab Results   Component Value Date/Time    ALT (SGPT) 31 08/25/2017 08:35 AM    AST (SGOT) 30 08/25/2017 08:35 AM    Alk. phosphatase 110 08/25/2017 08:35 AM    Bilirubin, total 1.1 08/25/2017 08:35 AM     Lab Results   Component Value Date/Time    GFR est AA >60 08/25/2017 08:35 AM    GFR est non-AA 57 08/25/2017 08:35 AM    Creatinine 1.24 08/25/2017 08:35 AM    BUN 12 08/25/2017 08:35 AM    Sodium 141 08/25/2017 08:35 AM    Potassium 3.5 08/25/2017 08:35 AM    Chloride 103 08/25/2017 08:35 AM    CO2 31 08/25/2017 08:35 AM            Assessment / Plan     Diabetes well controlled, on metformin  Hypertension well controlled, on current meds, consider changing to or adding an ARB if any change in meds or BP  Hyperlipidemia well controlled, on crestor. ICD-10-CM ICD-9-CM    1.  Type 2 diabetes mellitus with diabetic neuropathy, without long-term current use of insulin (HCC) E11.40 250.60 Management as above HEMOGLOBIN A1C W/O EAG     357.2    2. HTN (hypertension), benign U47 035.1 METABOLIC PANEL, COMPREHENSIVE   3. High cholesterol E78.00 272.0 LIPID PANEL   4. Late onset Alzheimer's disease with behavioral disturbance G30.1 331.0 Slowly progressing on maximal therapy     F02.81 294.11    5. Polyneuropathy associated with underlying disease ([facility] [address] G63 357.4 Pain is controlled on Ultram. Due to dementia hasve not done pain contract. Pt and his wife who controls his meds show not signs of over use or abuse at this time. Diabetic issues reviewed with him: diabetic diet discussed in detail, and low cholesterol diet, weight control and daily exercise discussed. Follow-up Disposition:  Return in about 4 months (around 1/1/2018) for labs 1 week before. Reviewed plan of care. Patient has provided input and agrees with goals.

## 2017-09-01 NOTE — PATIENT INSTRUCTIONS

## 2017-09-01 NOTE — PROGRESS NOTES
Stephen Beckham. is a  68 y.o. male presents today for office visit for routine follow up. 1. Have you been to the ER, urgent care clinic or hospitalized since your last visit? NO     2. Have you seen or consulted any other health care providers outside of the [address] [place] [address] since your last visit (Include any pap smears or colon screening)?  YES  Dr. Sanjiv Mireles

## 2017-09-11 RX ORDER — QUETIAPINE FUMARATE 50 MG/1
TABLET, FILM COATED ORAL
Qty: 30 TAB | Refills: 3 | Status: SHIPPED | OUTPATIENT
Start: 2017-09-11 | End: 2018-04-24 | Stop reason: SDUPTHER

## 2017-09-11 RX ORDER — PANTOPRAZOLE SODIUM 40 MG/1
TABLET, DELAYED RELEASE ORAL
Qty: 30 TAB | Refills: 2 | Status: SHIPPED | OUTPATIENT
Start: 2017-09-11 | End: 2017-12-31 | Stop reason: SDUPTHER

## 2017-12-01 NOTE — TELEPHONE ENCOUNTER
Pt called in requesting refill of his   Requested Prescriptions     Pending Prescriptions Disp Refills    traMADol (ULTRAM) 50 mg tablet 60 Tab 3   .

## 2017-12-02 RX ORDER — ROSUVASTATIN CALCIUM 5 MG/1
TABLET, COATED ORAL
Qty: 30 TAB | Refills: 4 | Status: SHIPPED | OUTPATIENT
Start: 2017-12-02 | End: 2018-04-24 | Stop reason: SDUPTHER

## 2017-12-04 RX ORDER — TRAMADOL HYDROCHLORIDE 50 MG/1
TABLET ORAL
Qty: 60 TAB | Refills: 3 | Status: SHIPPED | OUTPATIENT
Start: 2017-12-04 | End: 2018-04-02 | Stop reason: SDUPTHER

## 2017-12-06 DIAGNOSIS — F41.9 ANXIETY: ICD-10-CM

## 2017-12-06 RX ORDER — ALPRAZOLAM 0.25 MG/1
TABLET ORAL
Qty: 90 TAB | Refills: 2 | Status: SHIPPED | OUTPATIENT
Start: 2017-12-06 | End: 2018-04-02 | Stop reason: SDUPTHER

## 2017-12-06 NOTE — TELEPHONE ENCOUNTER
Requested Prescriptions     Pending Prescriptions Disp Refills    ALPRAZolam (XANAX) 0.25 mg tablet 90 Tab 2

## 2017-12-29 ENCOUNTER — HOSPITAL ENCOUNTER (OUTPATIENT)
Dept: LAB | Age: 77
Discharge: HOME | End: 2017-12-29
Payer: MEDICARE

## 2017-12-29 DIAGNOSIS — E78.00 HIGH CHOLESTEROL: ICD-10-CM

## 2017-12-29 DIAGNOSIS — E11.40 TYPE 2 DIABETES MELLITUS WITH DIABETIC NEUROPATHY, WITHOUT LONG-TERM CURRENT USE OF INSULIN (HCC): ICD-10-CM

## 2017-12-29 DIAGNOSIS — I10 HTN (HYPERTENSION), BENIGN: ICD-10-CM

## 2017-12-29 LAB
ALBUMIN SERPL-MCNC: 4 G/DL (ref 3.4–5)
ALBUMIN/GLOB SERPL: 1.1 {RATIO} (ref 0.8–1.7)
ALP SERPL-CCNC: 116 U/L (ref 45–117)
ALT SERPL-CCNC: 23 U/L (ref 16–61)
ANION GAP SERPL CALC-SCNC: 6 MMOL/L (ref 3–18)
AST SERPL-CCNC: 29 U/L (ref 15–37)
BILIRUB SERPL-MCNC: 0.7 MG/DL (ref 0.2–1)
BUN SERPL-MCNC: 12 MG/DL (ref 7–18)
BUN/CREAT SERPL: 10 (ref 12–20)
CALCIUM SERPL-MCNC: 8.3 MG/DL (ref 8.5–10.1)
CHLORIDE SERPL-SCNC: 103 MMOL/L (ref 100–108)
CHOLEST SERPL-MCNC: 117 MG/DL
CO2 SERPL-SCNC: 34 MMOL/L (ref 21–32)
CREAT SERPL-MCNC: 1.22 MG/DL (ref 0.6–1.3)
GLOBULIN SER CALC-MCNC: 3.5 G/DL (ref 2–4)
GLUCOSE SERPL-MCNC: 97 MG/DL (ref 74–99)
HBA1C MFR BLD: 5.9 % (ref 4.2–5.6)
HDLC SERPL-MCNC: 41 MG/DL (ref 40–60)
HDLC SERPL: 2.9 {RATIO} (ref 0–5)
LDLC SERPL CALC-MCNC: 41.2 MG/DL (ref 0–100)
LIPID PROFILE,FLP: ABNORMAL
POTASSIUM SERPL-SCNC: 3.7 MMOL/L (ref 3.5–5.5)
PROT SERPL-MCNC: 7.5 G/DL (ref 6.4–8.2)
SODIUM SERPL-SCNC: 143 MMOL/L (ref 136–145)
TRIGL SERPL-MCNC: 174 MG/DL (ref ?–150)
VLDLC SERPL CALC-MCNC: 34.8 MG/DL

## 2017-12-29 PROCEDURE — 36415 COLL VENOUS BLD VENIPUNCTURE: CPT | Performed by: INTERNAL MEDICINE

## 2017-12-29 PROCEDURE — 80061 LIPID PANEL: CPT | Performed by: INTERNAL MEDICINE

## 2017-12-29 PROCEDURE — 83036 HEMOGLOBIN GLYCOSYLATED A1C: CPT | Performed by: INTERNAL MEDICINE

## 2017-12-29 PROCEDURE — 80053 COMPREHEN METABOLIC PANEL: CPT | Performed by: INTERNAL MEDICINE

## 2017-12-31 RX ORDER — PANTOPRAZOLE SODIUM 40 MG/1
TABLET, DELAYED RELEASE ORAL
Qty: 30 TAB | Refills: 2 | Status: SHIPPED | OUTPATIENT
Start: 2017-12-31 | End: 2018-03-22 | Stop reason: SDUPTHER

## 2018-01-02 ENCOUNTER — OFFICE VISIT (OUTPATIENT)
Dept: FAMILY MEDICINE CLINIC | Age: 78
End: 2018-01-02

## 2018-01-02 VITALS
OXYGEN SATURATION: 95 % | TEMPERATURE: 98.8 F | HEART RATE: 68 BPM | BODY MASS INDEX: 37.59 KG/M2 | SYSTOLIC BLOOD PRESSURE: 159 MMHG | HEIGHT: 68 IN | RESPIRATION RATE: 18 BRPM | WEIGHT: 248 LBS | DIASTOLIC BLOOD PRESSURE: 84 MMHG

## 2018-01-02 DIAGNOSIS — E11.40 TYPE 2 DIABETES MELLITUS WITH DIABETIC NEUROPATHY, WITHOUT LONG-TERM CURRENT USE OF INSULIN (HCC): Primary | ICD-10-CM

## 2018-01-02 DIAGNOSIS — F41.9 ANXIETY: ICD-10-CM

## 2018-01-02 DIAGNOSIS — Z23 ENCOUNTER FOR IMMUNIZATION: ICD-10-CM

## 2018-01-02 DIAGNOSIS — F02.818 LATE ONSET ALZHEIMER'S DISEASE WITH BEHAVIORAL DISTURBANCE (HCC): ICD-10-CM

## 2018-01-02 DIAGNOSIS — I10 HTN (HYPERTENSION), BENIGN: ICD-10-CM

## 2018-01-02 DIAGNOSIS — G30.1 LATE ONSET ALZHEIMER'S DISEASE WITH BEHAVIORAL DISTURBANCE (HCC): ICD-10-CM

## 2018-01-02 DIAGNOSIS — E78.5 DYSLIPIDEMIA: ICD-10-CM

## 2018-01-02 NOTE — MR AVS SNAPSHOT
Visit Information Date & Time Provider Department Dept. Phone Encounter #  
 1/2/2018 10:15 AM Marzena Fleischer[address] [place] [address] [id number] Follow-up Instructions Return in about 4 months (around 5/2/2018) for OV, and Medicare Wellness Visit, labs 1 week before. Upcoming Health Maintenance Date Due Pneumococcal 65+ High/Highest Risk (2 of 2 - PCV13) 10/10/2014 EYE EXAM RETINAL OR DILATED Q1 3/24/2016 FOOT EXAM Q1 6/15/2016 COLONOSCOPY 1/22/2017 GLAUCOMA SCREENING Q2Y 3/24/2017 Influenza Age 5 to Adult 8/1/2017 MEDICARE YEARLY EXAM 2/25/2018 HEMOGLOBIN A1C Q6M 6/29/2018 MICROALBUMIN Q1 8/25/2018 LIPID PANEL Q1 12/29/2018 DTaP/Tdap/Td series (2 - Td) 4/28/2027 Allergies as of 1/2/2018  Review Complete On: 1/2/2018 By: Jessica Betancourt LPN Severity Noted Reaction Type Reactions Chlorhexidine [place]  11/04/2014    Itching Current Immunizations  Reviewed on 4/28/2017 Name Date Influenza Vaccine 10/23/2014 Influenza Vaccine (Quad) PF 10/21/2016, 10/12/2015 Influenza Vaccine PF 10/10/2013 Pneumococcal Polysaccharide (PPSV-23) 10/10/2013 Tdap 4/28/2017 Not reviewed this visit You Were Diagnosed With   
  
 Codes Comments Type 2 diabetes mellitus with diabetic neuropathy, without long-term current use of insulin (HCC)    -  Primary ICD-10-CM: E11.40 ICD-9-CM: 250.60, 357.2 Dyslipidemia     ICD-10-CM: E78.5 ICD-9-CM: 272.4 HTN (hypertension), benign     ICD-10-CM: I10 
ICD-9-CM: 401.1 Late onset Alzheimer's disease with behavioral disturbance     ICD-10-CM: G30.1, F02.81 ICD-9-CM: 331.0, 294.11 Anxiety     ICD-10-CM: F41.9 ICD-9-CM: 300.00 Vitals BP Pulse Temp Resp Height(growth percentile) Weight(growth percentile) 159/84 68 98.8 °F (37.1 °C) (Oral) 18 5' 8\" (1.727 m) 248 lb (112.5 kg) SpO2 BMI Smoking Status 95% 37.71 kg/m2 Former Smoker Vitals History BMI and BSA Data Body Mass Index Body Surface Area  
 37.71 kg/m 2 2.32 m 2 Preferred Pharmacy Pharmacy Name Phone [facility] #[id number], [address] [facility] [phone] Your Updated Medication List  
  
   
This list is accurate as of: 1/2/18 10:47 AM.  Always use your most recent med list.  
  
  
  
  
 ALPRAZolam 0.25 mg tablet Commonly known as:  XANAX  
TAKE 1 TABLET BY MOUTH 3 TIMES DAILY AS NEEDED FOR ANXIETY. MAX DAILY AMOUNT IS 0.75MG (3 TABLETS) amLODIPine 5 mg tablet Commonly known as:  NORVASC  
take 1 tablet by mouth once daily  
  
 aspirin 81 mg chewable tablet Take 1 Tab by mouth daily. cholecalciferol 1,000 unit tablet Commonly known as:  VITAMIN D3 Take 1,000 Units by mouth daily. clotrimazole-betamethasone topical cream  
Commonly known as:  Villarreal Fink Apply pea size affected area BID for 1 week  
  
 donepezil 23 mg film coated tablet Commonly known as:  ARICEPT  
  
 escitalopram oxalate 10 mg tablet Commonly known as:  Rukhsana [place] TAKE 1 TABLET BY MOUTH ONCE DAILY  
  
 ketoconazole 2 % topical cream  
Commonly known as:  NIZORAL Apply  to affected area daily. levETIRAcetam 250 mg tablet Commonly known as:  KEPPRA Take 1 Tab by mouth two (2) times a day. memantine 10 mg tablet Commonly known as:  Chacha Anis TAKE 1 TABLET BY MOUTH TWICE DAILY  
  
 metoprolol succinate 50 mg XL tablet Commonly known as:  TOPROL-XL  
TAKE 1 TABLET BY MOUTH ONCE DAILY  
  
 multivitamin, tx-iron-ca-min 9 mg iron-400 mcg Tab tablet Commonly known as:  THERA-M w/ IRON Take 1 Tab by mouth daily. OTHER Compound Cream Gabapentin 6%, Baclofen 2%, Cyclobenzaprine 2%, Lidocaine 2%, Flurbiprofen 10% with Ketamine 10% Apply 1-2 grams (1-2 pumps) to affected area 3-4 times 120grams/2 refills OTHER Check CBC, CMP, Mg in 5 days, results to PCP immediately, Diagnosis- HTN  
  
 pantoprazole 40 mg tablet Commonly known as:  PROTONIX  
TAKE 1 TAB BY MOUTH DAILY. QUEtiapine 50 mg tablet Commonly known as:  SEROquel TAKE 1 TABLET BY MOUTH ONCE DAILY AT BEDTIME  
  
 rosuvastatin 5 mg tablet Commonly known as:  CRESTOR  
TAKE 1 TABLET BY MOUTH AT BEDTIME IF NEEDED  
  
 traMADol 50 mg tablet Commonly known as:  ULTRAM  
TAKE 2 TABLETS BY MOUTH EVERY 6 HOURS IF NEEDED(DO NOT TAKE MORE THAN 8 TABLETS A DAY)  
  
 valsartan 160 mg tablet Commonly known as:  DIOVAN Take 1 Tab by mouth daily. Follow-up Instructions Return in about 4 months (around 5/2/2018) for OV, and Medicare Wellness Visit, labs 1 week before. Patient Instructions High Blood Pressure: Care Instructions Your Care Instructions If your blood pressure is usually above 140/90, you have high blood pressure, or hypertension. That means the top number is 140 or higher or the bottom number is 90 or higher, or both. Despite what a lot of people think, high blood pressure usually doesn't cause headaches or make you feel dizzy or lightheaded. It usually has no symptoms. But it does increase your risk for heart attack, stroke, and kidney or eye damage. The higher your blood pressure, the more your risk increases. Your doctor will give you a goal for your blood pressure. Your goal will be based on your health and your age. An example of a goal is to keep your blood pressure below 140/90. Lifestyle changes, such as eating healthy and being active, are always important to help lower blood pressure. You might also take medicine to reach your blood pressure goal. 
Follow-up care is a key part of your treatment and safety. Be sure to make and go to all appointments, and call your doctor if you are having problems. It's also a good idea to know your test results and keep a list of the medicines you take. How can you care for yourself at home? Medical treatment · If you stop taking your medicine, your blood pressure will go back up. You may take one or more types of medicine to lower your blood pressure. Be safe with medicines. Take your medicine exactly as prescribed. Call your doctor if you think you are having a problem with your medicine. · Talk to your doctor before you start taking aspirin every day. Aspirin can help certain people lower their risk of a heart attack or stroke. But taking aspirin isn't right for everyone, because it can cause serious bleeding. · See your doctor regularly. You may need to see the doctor more often at first or until your blood pressure comes down. · If you are taking blood pressure medicine, talk to your doctor before you take decongestants or anti-inflammatory medicine, such as ibuprofen. Some of these medicines can raise blood pressure. · Learn how to check your blood pressure at home. Lifestyle changes · Stay at a healthy weight. This is especially important if you put on weight around the waist. Losing even 10 pounds can help you lower your blood pressure. · If your doctor recommends it, get more exercise. Walking is a good choice. Bit by bit, increase the amount you walk every day. Try for at least 30 minutes on most days of the week. You also may want to swim, bike, or do other activities. · Avoid or limit alcohol. Talk to your doctor about whether you can drink any alcohol. · Try to limit how much sodium you eat to less than 2,300 milligrams (mg) a day. Your doctor may ask you to try to eat less than 1,500 mg a day. · Eat plenty of fruits (such as bananas and oranges), vegetables, legumes, whole grains, and low-fat dairy products. · Lower the amount of saturated fat in your diet. Saturated fat is found in animal products such as milk, cheese, and meat. Limiting these foods may help you lose weight and also lower your risk for heart disease. · Do not smoke. Smoking increases your risk for heart attack and stroke. If you need help quitting, talk to your doctor about stop-smoking programs and medicines. These can increase your chances of quitting for good. When should you call for help? Call 911 anytime you think you may need emergency care. This may mean having symptoms that suggest that your blood pressure is causing a serious heart or blood vessel problem. Your blood pressure may be over 180/110. ? For example, call 911 if: 
? · You have symptoms of a heart attack. These may include: ¨ Chest pain or pressure, or a strange feeling in the chest. 
¨ Sweating. ¨ Shortness of breath. ¨ Nausea or vomiting. ¨ Pain, pressure, or a strange feeling in the back, neck, jaw, or upper belly or in one or both shoulders or arms. ¨ Lightheadedness or sudden weakness. ¨ A fast or irregular heartbeat. ? · You have symptoms of a stroke. These may include: 
¨ Sudden numbness, tingling, weakness, or loss of movement in your face, arm, or leg, especially on only one side of your body. ¨ Sudden vision changes. ¨ Sudden trouble speaking. ¨ Sudden confusion or trouble understanding simple statements. ¨ Sudden problems with walking or balance. ¨ A sudden, severe headache that is different from past headaches. ? · You have severe back or belly pain. ?Do not wait until your blood pressure comes down on its own. Get help right away. ?Call your doctor now or seek immediate care if: 
? · Your blood pressure is much higher than normal (such as 180/110 or higher), but you don't have symptoms. ? · You think high blood pressure is causing symptoms, such as: ¨ Severe headache. ¨ Blurry vision. ? Watch closely for changes in your health, and be sure to contact your doctor if: 
? · Your blood pressure measures 140/90 or higher at least 2 times. That means the top number is 140 or higher or the bottom number is 90 or higher, or both. ? · You think you may be having side effects from your blood pressure medicine. ? · Your blood pressure is usually normal, but it goes above normal at least 2 times. Where can you learn more? Go to http://christie-brigid.info/. Enter U203 in the search box to learn more about \"High Blood Pressure: Care Instructions. \" Current as of: September 21, 2016 Content Version: 11.4 © 2678-1445 [employer]. Care instructions adapted under license by [employer] (which disclaims liability or warranty for this information). If you have questions about a medical condition or this instruction, always ask your healthcare professional. [address] any warranty or liability for your use of this information. Introducing \[facility] [place] [facility]\"" & HEALTH SERVICES! Dalton Foote introduces Zipwhip patient portal. Now you can access parts of your medical record, email your doctor's office, and request medication refills online. 1. In your internet browser, go to [email or web address] 2. Click on the First Time User? Click Here link in the Sign In box. You will see the New Member Sign Up page. 3. Enter your Zipwhip Access Code exactly as it appears below. You will not need to use this code after youve completed the sign-up process. If you do not sign up before the expiration date, you must request a new code. · Zipwhip Access Code: [email or web address] Expires: 4/2/2018 10:27 AM 
 
4. Enter the last four digits of your Social Security Number (xxxx) and Date of Birth (mm/dd/yyyy) as indicated and click Submit. You will be taken to the next sign-up page. 5. Create a TLM Comt ID. This will be your Zipwhip login ID and cannot be changed, so think of one that is secure and easy to remember. 6. Create a Zipwhip password. You can change your password at any time. 7. Enter your Password Reset Question and Answer. This can be used at a later time if you forget your password. 8. Enter your e-mail address.  You will receive e-mail notification when new information is available in JournallyMe. 9. Click Sign Up. You can now view and download portions of your medical record. 10. Click the Download Summary menu link to download a portable copy of your medical information. If you have questions, please visit the Frequently Asked Questions section of the JournallyMe website. Remember, JournallyMe is NOT to be used for urgent needs. For medical emergencies, dial 911. Now available from your iPhone and Android! Please provide this summary of care documentation to your next provider. Your primary care clinician is listed as PAOLA DERAS. If you have any questions after today's visit, please call [phone].

## 2018-01-02 NOTE — PATIENT INSTRUCTIONS
High Blood Pressure: Care Instructions  Your Care Instructions    If your blood pressure is usually above 140/90, you have high blood pressure, or hypertension. That means the top number is 140 or higher or the bottom number is 90 or higher, or both. Despite what a lot of people think, high blood pressure usually doesn't cause headaches or make you feel dizzy or lightheaded. It usually has no symptoms. But it does increase your risk for heart attack, stroke, and kidney or eye damage. The higher your blood pressure, the more your risk increases. Your doctor will give you a goal for your blood pressure. Your goal will be based on your health and your age. An example of a goal is to keep your blood pressure below 140/90. Lifestyle changes, such as eating healthy and being active, are always important to help lower blood pressure. You might also take medicine to reach your blood pressure goal.  Follow-up care is a key part of your treatment and safety. Be sure to make and go to all appointments, and call your doctor if you are having problems. It's also a good idea to know your test results and keep a list of the medicines you take. How can you care for yourself at home? Medical treatment  · If you stop taking your medicine, your blood pressure will go back up. You may take one or more types of medicine to lower your blood pressure. Be safe with medicines. Take your medicine exactly as prescribed. Call your doctor if you think you are having a problem with your medicine. · Talk to your doctor before you start taking aspirin every day. Aspirin can help certain people lower their risk of a heart attack or stroke. But taking aspirin isn't right for everyone, because it can cause serious bleeding. · See your doctor regularly. You may need to see the doctor more often at first or until your blood pressure comes down.   · If you are taking blood pressure medicine, talk to your doctor before you take decongestants or anti-inflammatory medicine, such as ibuprofen. Some of these medicines can raise blood pressure. · Learn how to check your blood pressure at home. Lifestyle changes  · Stay at a healthy weight. This is especially important if you put on weight around the waist. Losing even 10 pounds can help you lower your blood pressure. · If your doctor recommends it, get more exercise. Walking is a good choice. Bit by bit, increase the amount you walk every day. Try for at least 30 minutes on most days of the week. You also may want to swim, bike, or do other activities. · Avoid or limit alcohol. Talk to your doctor about whether you can drink any alcohol. · Try to limit how much sodium you eat to less than 2,300 milligrams (mg) a day. Your doctor may ask you to try to eat less than 1,500 mg a day. · Eat plenty of fruits (such as bananas and oranges), vegetables, legumes, whole grains, and low-fat dairy products. · Lower the amount of saturated fat in your diet. Saturated fat is found in animal products such as milk, cheese, and meat. Limiting these foods may help you lose weight and also lower your risk for heart disease. · Do not smoke. Smoking increases your risk for heart attack and stroke. If you need help quitting, talk to your doctor about stop-smoking programs and medicines. These can increase your chances of quitting for good. When should you call for help? Call 911 anytime you think you may need emergency care. This may mean having symptoms that suggest that your blood pressure is causing a serious heart or blood vessel problem. Your blood pressure may be over 180/110. ? For example, call 911 if:  ? · You have symptoms of a heart attack. These may include:  ¨ Chest pain or pressure, or a strange feeling in the chest.  ¨ Sweating. ¨ Shortness of breath. ¨ Nausea or vomiting.   ¨ Pain, pressure, or a strange feeling in the back, neck, jaw, or upper belly or in one or both shoulders or arms.  ¨ Lightheadedness or sudden weakness. ¨ A fast or irregular heartbeat. ? · You have symptoms of a stroke. These may include:  ¨ Sudden numbness, tingling, weakness, or loss of movement in your face, arm, or leg, especially on only one side of your body. ¨ Sudden vision changes. ¨ Sudden trouble speaking. ¨ Sudden confusion or trouble understanding simple statements. ¨ Sudden problems with walking or balance. ¨ A sudden, severe headache that is different from past headaches. ? · You have severe back or belly pain. ?Do not wait until your blood pressure comes down on its own. Get help right away. ?Call your doctor now or seek immediate care if:  ? · Your blood pressure is much higher than normal (such as 180/110 or higher), but you don't have symptoms. ? · You think high blood pressure is causing symptoms, such as:  ¨ Severe headache. ¨ Blurry vision. ? Watch closely for changes in your health, and be sure to contact your doctor if:  ? · Your blood pressure measures 140/90 or higher at least 2 times. That means the top number is 140 or higher or the bottom number is 90 or higher, or both. ? · You think you may be having side effects from your blood pressure medicine. ? · Your blood pressure is usually normal, but it goes above normal at least 2 times. Where can you learn more? Go to http://christie-brigid.info/. Enter K326 in the search box to learn more about \"High Blood Pressure: Care Instructions. \"  Current as of: September 21, 2016  Content Version: 11.4  © 6649-9171 [employer]. Care instructions adapted under license by [employer] (which disclaims liability or warranty for this information). If you have questions about a medical condition or this instruction, always ask your healthcare professional. [place] [address] any warranty or liability for your use of this information.

## 2018-01-02 NOTE — PROGRESS NOTES
Patient is in the office today for a 4 month follow up. 1. Have you been to the ER, urgent care clinic since your last visit? Hospitalized since your last visit? No    2. Have you seen or consulted any other health care providers outside of the [address] [place] [address] since your last visit? Include any pap smears or colon screening. No      Verbal order read back per Dr. Dalton Sampson high dose flu vaccine. Patient received high dose flu vaccine in left deltoid. Patient was observed for 10 minutes and no signs or symptoms of allergic reaction noted. Patient tolerated well and left without complaints. Patient received flu VIS.

## 2018-01-23 NOTE — PROGRESS NOTES
Subjective:       Chief Complaint  The patient presents for follow up of diabetes, hypertension and high cholesterol. JOE Hoffmann Sr. is a 68 y.o. male seen for follow up of diabetes. Healso has hypertension and hyperlipidemia. Diabetes well controlled, no significant medication side effects noted, on metformin, hypertension normally well controlled, no significant medication side effects noted, on Norvasc, pt has no proteinuria to be on ACE-I currently , hyperlipidemia well controlled, no significant medication side effects noted, on Crestor 5 mg    Diet and Lifestyle: not attempting to follow a low fat, low cholesterol diet, does not rigorously follow a diabetic diet, sedentary    Home BP Monitoring: is not measured at home. Diabetic Review of Systems - home glucose monitoring: is performed sporadically, should be 1x/day. Other symptoms and concerns: pt's Dementia continues to progress even  on Namenda and Aricept. His behavioral problems and difficulty with insomnia has improved on Seroquel. Anxiety remains stable on Xanax, Lexapro and Seroquel. Pt's wife is currently coping with keeping him at home. 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 Pain is controlled on ultram. Have not yet done pain contract due to dementia. Discussed the patient's BMI with him. The BMI follow up plan is as follows: BMI is out of normal parameters and plan is as follows: I have counseled this patient on diet and exercise regimens. Current Outpatient Prescriptions   Medication Sig    pantoprazole (PROTONIX) 40 mg tablet TAKE 1 TAB BY MOUTH DAILY.  traMADol (ULTRAM) 50 mg tablet TAKE 2 TABLETS BY MOUTH EVERY 6 HOURS IF NEEDED(DO NOT TAKE MORE THAN 8 TABLETS A DAY)    rosuvastatin (CRESTOR) 5 mg tablet TAKE 1 TABLET BY MOUTH AT BEDTIME IF NEEDED    QUEtiapine (SEROQUEL) 50 mg tablet TAKE 1 TABLET BY MOUTH ONCE DAILY AT BEDTIME    valsartan (DIOVAN) 160 mg tablet Take 1 Tab by mouth daily.     metoprolol succinate (TOPROL-XL) 50 mg XL tablet TAKE 1 TABLET BY MOUTH ONCE DAILY    escitalopram oxalate (LEXAPRO) 10 mg tablet TAKE 1 TABLET BY MOUTH ONCE DAILY    memantine (NAMENDA) 10 mg tablet TAKE 1 TABLET BY MOUTH TWICE DAILY    amLODIPine (NORVASC) 5 mg tablet take 1 tablet by mouth once daily    levETIRAcetam (KEPPRA) 250 mg tablet Take 1 Tab by mouth two (2) times a day.  cholecalciferol (VITAMIN D3) 1,000 unit tablet Take 1,000 Units by mouth daily.  donepezil (ARICEPT) 23 mg film coated tablet     aspirin 81 mg chewable tablet Take 1 Tab by mouth daily.  OTHER Check CBC, CMP, Mg in 5 days, results to PCP immediately, Diagnosis- HTN    multivitamin, tx-iron-ca-min (THERA-M W/ IRON) 9 mg iron-400 mcg tab tablet Take 1 Tab by mouth daily.  ALPRAZolam (XANAX) 0.25 mg tablet TAKE 1 TABLET BY MOUTH 3 TIMES DAILY AS NEEDED FOR ANXIETY. MAX DAILY AMOUNT IS 0.75MG (3 TABLETS)    ketoconazole (NIZORAL) 2 % topical cream Apply  to affected area daily.  clotrimazole-betamethasone (LOTRISONE) topical cream Apply pea size affected area BID for 1 week    OTHER Compound Cream  Gabapentin 6%, Baclofen 2%, Cyclobenzaprine 2%, Lidocaine 2%, Flurbiprofen 10% with Ketamine 10%  Apply 1-2 grams (1-2 pumps) to affected area 3-4 times  120grams/2 refills     No current facility-administered medications for this visit.               Review of Systems  Respiratory: negative for dyspnea on exertion  Cardiovascular: negative for chest pain    Objective:     Visit Vitals    /84    Pulse 68    Temp 98.8 °F (37.1 °C) (Oral)    Resp 18    Ht 5' 8\" (1.727 m)    Wt 248 lb (112.5 kg)    SpO2 95%    BMI 37.71 kg/m2        General appearance - alert, well appearing, and in no distress  Neck - supple, no significant adenopathy, carotids upstroke normal bilaterally, no bruits  Chest - clear to auscultation, no wheezes, rales or rhonchi, symmetric air entry  Heart - normal rate, regular rhythm, normal S1, S2, no murmurs, rubs, clicks or gallops  Neurological - paresthesias in feet   Extremities - peripheral pulses normal, no pedal edema, no clubbing or cyanosis  Skin - normal coloration and turgor, no rashes, no suspicious skin lesions noted      Labs:   Lab Results   Component Value Date/Time    Hemoglobin A1c 5.9 12/29/2017 08:01 AM    Hemoglobin A1c 6.0 08/25/2017 08:35 AM    Hemoglobin A1c 6.2 04/21/2017 08:54 AM    Glucose 97 12/29/2017 08:01 AM    Glucose (POC) 122 04/01/2016 11:16 AM    Microalbumin/Creat ratio (mg/g creat) 41 08/25/2017 08:35 AM    Microalbumin,urine random 4.90 08/25/2017 08:35 AM    LDL, calculated 41.2 12/29/2017 08:01 AM    Creatinine 1.22 12/29/2017 08:01 AM      Lab Results   Component Value Date/Time    Cholesterol, total 117 12/29/2017 08:01 AM    HDL Cholesterol 41 12/29/2017 08:01 AM    LDL, calculated 41.2 12/29/2017 08:01 AM    Triglyceride 174 12/29/2017 08:01 AM    CHOL/HDL Ratio 2.9 12/29/2017 08:01 AM     Lab Results   Component Value Date/Time    ALT (SGPT) 23 12/29/2017 08:01 AM    AST (SGOT) 29 12/29/2017 08:01 AM    Alk. phosphatase 116 12/29/2017 08:01 AM    Bilirubin, total 0.7 12/29/2017 08:01 AM     Lab Results   Component Value Date/Time    GFR est AA >60 12/29/2017 08:01 AM    GFR est non-AA 58 12/29/2017 08:01 AM    Creatinine 1.22 12/29/2017 08:01 AM    BUN 12 12/29/2017 08:01 AM    Sodium 143 12/29/2017 08:01 AM    Potassium 3.7 12/29/2017 08:01 AM    Chloride 103 12/29/2017 08:01 AM    CO2 34 12/29/2017 08:01 AM            Assessment / Plan     Diabetes well controlled, on metformin  Hypertension normally well controlled, on current meds, consider addingARB if BP remains elevated  Hyperlipidemia well controlled, on crestor. ICD-10-CM ICD-9-CM    1. Type 2 diabetes mellitus with diabetic neuropathy, without long-term current use of insulin (HCC) E11.40 250.60 HEMOGLOBIN A1C W/O EAG     357.2    2.  Dyslipidemia E78.5 272.4 LIPID PANEL   3. HTN (hypertension), benign N99 164.9 METABOLIC PANEL, COMPREHENSIVE   4. Late onset Alzheimer's disease with behavioral disturbance G30.1 331.0 Pt slowly progressing family managing with him at home     F02.81 294.11    5. Anxiety F41.9 300.00 Well controlled on Seroquel, Lexapro and Xanax    6. Encounter for immunization Z23 V03.89 INFLUENZA VIRUS VACCINE, HIGH DOSE SEASONAL, PRESERVATIVE FREE      ADMIN INFLUENZA VIRUS VAC               Diabetic issues reviewed with him: diabetic diet discussed in detail, and low cholesterol diet, weight control and daily exercise discussed. Follow-up Disposition:  Return in about 4 months (around 5/2/2018) for OV, and Medicare Wellness Visit, labs 1 week before. Reviewed plan of care. Patient has provided input and agrees with goals.

## 2018-02-03 ENCOUNTER — HOSPITAL ENCOUNTER (EMERGENCY)
Age: 78
Discharge: HOME | End: 2018-02-03
Attending: EMERGENCY MEDICINE | Admitting: EMERGENCY MEDICINE
Payer: MEDICARE

## 2018-02-03 VITALS
TEMPERATURE: 98.2 F | DIASTOLIC BLOOD PRESSURE: 80 MMHG | HEART RATE: 67 BPM | HEIGHT: 68 IN | BODY MASS INDEX: 38.95 KG/M2 | SYSTOLIC BLOOD PRESSURE: 182 MMHG | RESPIRATION RATE: 18 BRPM | WEIGHT: 257 LBS | OXYGEN SATURATION: 96 %

## 2018-02-03 DIAGNOSIS — L02.414 ABSCESS OF ARM, LEFT: ICD-10-CM

## 2018-02-03 DIAGNOSIS — L72.3 SEBACEOUS CYST: Primary | ICD-10-CM

## 2018-02-03 LAB
ALBUMIN SERPL-MCNC: 3.3 G/DL (ref 3.4–5)
ALBUMIN/GLOB SERPL: 0.9 {RATIO} (ref 0.8–1.7)
ALP SERPL-CCNC: 100 U/L (ref 45–117)
ALT SERPL-CCNC: 24 U/L (ref 16–61)
ANION GAP SERPL CALC-SCNC: 8 MMOL/L (ref 3–18)
AST SERPL-CCNC: 31 U/L (ref 15–37)
BILIRUB SERPL-MCNC: 0.8 MG/DL (ref 0.2–1)
BUN SERPL-MCNC: 13 MG/DL (ref 7–18)
BUN/CREAT SERPL: 10 (ref 12–20)
CALCIUM SERPL-MCNC: 8.6 MG/DL (ref 8.5–10.1)
CHLORIDE SERPL-SCNC: 103 MMOL/L (ref 100–108)
CO2 SERPL-SCNC: 32 MMOL/L (ref 21–32)
CREAT SERPL-MCNC: 1.32 MG/DL (ref 0.6–1.3)
ERYTHROCYTE [DISTWIDTH] IN BLOOD BY AUTOMATED COUNT: 13.1 % (ref 11.6–14.5)
GLOBULIN SER CALC-MCNC: 3.7 G/DL (ref 2–4)
GLUCOSE SERPL-MCNC: 131 MG/DL (ref 74–99)
HCT VFR BLD AUTO: 40.3 % (ref 36–48)
HGB BLD-MCNC: 12.8 G/DL (ref 13–16)
MCH RBC QN AUTO: 29.1 PG (ref 24–34)
MCHC RBC AUTO-ENTMCNC: 31.8 G/DL (ref 31–37)
MCV RBC AUTO: 91.6 FL (ref 74–97)
PLATELET # BLD AUTO: 152 K/UL (ref 135–420)
PMV BLD AUTO: 10.5 FL (ref 9.2–11.8)
POTASSIUM SERPL-SCNC: 3.8 MMOL/L (ref 3.5–5.5)
PROT SERPL-MCNC: 7 G/DL (ref 6.4–8.2)
RBC # BLD AUTO: 4.4 M/UL (ref 4.7–5.5)
SODIUM SERPL-SCNC: 143 MMOL/L (ref 136–145)
WBC # BLD AUTO: 4.6 K/UL (ref 4.6–13.2)

## 2018-02-03 PROCEDURE — 80053 COMPREHEN METABOLIC PANEL: CPT | Performed by: EMERGENCY MEDICINE

## 2018-02-03 PROCEDURE — 87076 CULTURE ANAEROBE IDENT EACH: CPT | Performed by: EMERGENCY MEDICINE

## 2018-02-03 PROCEDURE — 85027 COMPLETE CBC AUTOMATED: CPT | Performed by: EMERGENCY MEDICINE

## 2018-02-03 PROCEDURE — 87070 CULTURE OTHR SPECIMN AEROBIC: CPT | Performed by: EMERGENCY MEDICINE

## 2018-02-03 PROCEDURE — 75810000289 HC I&D ABSCESS SIMP/COMP/MULT

## 2018-02-03 PROCEDURE — 74011000250 HC RX REV CODE- 250

## 2018-02-03 PROCEDURE — 77030019895 HC PCKNG STRP IODO -A

## 2018-02-03 PROCEDURE — 99282 EMERGENCY DEPT VISIT SF MDM: CPT

## 2018-02-03 RX ORDER — DOXYCYCLINE HYCLATE 100 MG
100 TABLET ORAL 2 TIMES DAILY
COMMUNITY
End: 2018-02-20 | Stop reason: ALTCHOICE

## 2018-02-03 RX ORDER — LIDOCAINE HYDROCHLORIDE AND EPINEPHRINE 20; 5 MG/ML; UG/ML
INJECTION, SOLUTION EPIDURAL; INFILTRATION; INTRACAUDAL; PERINEURAL
Status: COMPLETED
Start: 2018-02-03 | End: 2018-02-03

## 2018-02-03 RX ORDER — LIDOCAINE HYDROCHLORIDE AND EPINEPHRINE 20; 5 MG/ML; UG/ML
4.5 INJECTION, SOLUTION EPIDURAL; INFILTRATION; INTRACAUDAL; PERINEURAL ONCE
Status: COMPLETED | OUTPATIENT
Start: 2018-02-03 | End: 2018-02-03

## 2018-02-03 RX ORDER — LIDOCAINE HYDROCHLORIDE AND EPINEPHRINE 20; 10 MG/ML; UG/ML
4.5 INJECTION, SOLUTION INFILTRATION; PERINEURAL ONCE
Status: DISCONTINUED | OUTPATIENT
Start: 2018-02-03 | End: 2018-02-03

## 2018-02-03 RX ORDER — AMOXICILLIN AND CLAVULANATE POTASSIUM 875; 125 MG/1; MG/1
1 TABLET, FILM COATED ORAL 2 TIMES DAILY
Qty: 20 TAB | Refills: 0 | Status: SHIPPED | OUTPATIENT
Start: 2018-02-03 | End: 2018-02-13

## 2018-02-03 RX ADMIN — LIDOCAINE HYDROCHLORIDE,EPINEPHRINE BITARTRATE 90 MG: 20; .005 INJECTION, SOLUTION EPIDURAL; INFILTRATION; INTRACAUDAL; PERINEURAL at 13:10

## 2018-02-03 RX ADMIN — LIDOCAINE HYDROCHLORIDE AND EPINEPHRINE 90 MG: 20; 5 INJECTION, SOLUTION EPIDURAL; INFILTRATION; INTRACAUDAL; PERINEURAL at 13:10

## 2018-02-03 NOTE — ED PROVIDER NOTES
EMERGENCY DEPARTMENT HISTORY AND PHYSICAL EXAM    11:29 AM      Date: 2/3/2018  Patient Name: Buck Rust.    History of Presenting Illness     Chief Complaint   Patient presents with    Abscess    Skin Problem         History Provided By: Patient and Patient's Wife    Chief Complaint: Abscess   Duration:  Days  Timing:  Worsening  Location: Left Forearm   Quality: N/A  Severity: N/A  Modifying Factors: None   Associated Symptoms: Left forearm pain and swelling       Additional History (Context): Derrick Ang Sr. is a 68 y.o. male presenting to the ED c/o worsening abscess to left forearm over the past several days. Wife states that pt had a cyst on his left forearm \"for a long time\" and has worsened over the past week. Wife notes increased swelling to left forearm. Pt also reports left forearm pain. Reports no modifying factors for his symptoms. Per wife, pt was seen at [facility] 2 days ago and prescribed doxycycline 100 mg and states they did not do an I&D. Denies any other symptoms or complaints. PCP: Vaishali Ring MD    Current Outpatient Prescriptions   Medication Sig Dispense Refill    doxycycline (VIBRA-TABS) 100 mg tablet Take 100 mg by mouth two (2) times a day.  amoxicillin-clavulanate (AUGMENTIN) 875-125 mg per tablet Take 1 Tab by mouth two (2) times a day for 10 days. 20 Tab 0    pantoprazole (PROTONIX) 40 mg tablet TAKE 1 TAB BY MOUTH DAILY. 30 Tab 2    ALPRAZolam (XANAX) 0.25 mg tablet TAKE 1 TABLET BY MOUTH 3 TIMES DAILY AS NEEDED FOR ANXIETY.  MAX DAILY AMOUNT IS 0.75MG (3 TABLETS) 90 Tab 2    traMADol (ULTRAM) 50 mg tablet TAKE 2 TABLETS BY MOUTH EVERY 6 HOURS IF NEEDED(DO NOT TAKE MORE THAN 8 TABLETS A DAY) 60 Tab 3    rosuvastatin (CRESTOR) 5 mg tablet TAKE 1 TABLET BY MOUTH AT BEDTIME IF NEEDED 30 Tab 4    QUEtiapine (SEROQUEL) 50 mg tablet TAKE 1 TABLET BY MOUTH ONCE DAILY AT BEDTIME 30 Tab 3    ketoconazole (NIZORAL) 2 % topical cream Apply  to affected area daily.      valsartan (DIOVAN) 160 mg tablet Take 1 Tab by mouth daily. 30 Tab 5    clotrimazole-betamethasone (LOTRISONE) topical cream Apply pea size affected area BID for 1 week 45 g 2    metoprolol succinate (TOPROL-XL) 50 mg XL tablet TAKE 1 TABLET BY MOUTH ONCE DAILY 30 Tab 5    escitalopram oxalate (LEXAPRO) 10 mg tablet TAKE 1 TABLET BY MOUTH ONCE DAILY 30 Tab 5    memantine (NAMENDA) 10 mg tablet TAKE 1 TABLET BY MOUTH TWICE DAILY 60 Tab 3    amLODIPine (NORVASC) 5 mg tablet take 1 tablet by mouth once daily 30 Tab 5    levETIRAcetam (KEPPRA) 250 mg tablet Take 1 Tab by mouth two (2) times a day. 60 Tab 5    cholecalciferol (VITAMIN D3) 1,000 unit tablet Take 1,000 Units by mouth daily.  donepezil (ARICEPT) 23 mg film coated tablet   0    aspirin 81 mg chewable tablet Take 1 Tab by mouth daily. 30 Tab 0    OTHER Check CBC, CMP, Mg in 5 days, results to PCP immediately, Diagnosis- HTN 1 Each 0    multivitamin, tx-iron-ca-min (THERA-M W/ IRON) 9 mg iron-400 mcg tab tablet Take 1 Tab by mouth daily.  OTHER Compound Cream  Gabapentin 6%, Baclofen 2%, Cyclobenzaprine 2%, Lidocaine 2%, Flurbiprofen 10% with Ketamine 10%  Apply 1-2 grams (1-2 pumps) to affected area 3-4 times  120grams/2 refills         Past History     Past Medical History:  Past Medical History:   Diagnosis Date    Anxiety     Arthritis     Cancer ([facility] [address] 2009    bladder     Carotid duplex 05/26/2011    No occlusive disease >49% bilaterally.     Chronic back pain 5/6/2010    Chronic traumatic encephalopathy     Dementia     Depression     he has taken Xanax for 16 years for this    Diabetes ([facility] [address]     Dizziness     ED (erectile dysfunction)     Fibrosis of knee joint     Peripatellar, right    Headache(784.0)     HTN (hypertension), benign 4/1/2010    Late onset Alzheimer's disease with behavioral disturbance     Left wrist pain     Lumbar spinal stenosis 9/25/2010    Neuropathy of lower extremity     Obesity  Osteoarthritis of both knees     Sleep apnea     cpap use    SOB (shortness of breath)     Vertigo, benign positional        Past Surgical History:  Past Surgical History:   Procedure Laterality Date    BIOPSY  12/19/2007    HX OTHER SURGICAL  11/2014    right knee    HX UROLOGICAL      Bladder CA       Family History:  Family History   Problem Relation Age of Onset    Hypertension Mother     Heart Disease Mother     Other Mother      Alzheimer's disease    Diabetes Neg Hx        Social History:  Social History   Substance Use Topics    Smoking status: Former Smoker    Smokeless tobacco: Never Used      Comment: quit smoking in 2000    Alcohol use No       Allergies: Allergies   Allergen Reactions    Chlorhexidine Towelette Itching         Review of Systems       Review of Systems   Constitutional: Negative. HENT: Negative. Eyes: Negative. Respiratory: Negative. Cardiovascular: Negative. Gastrointestinal: Negative. Genitourinary: Negative. Musculoskeletal: Negative.         + Left forearm pain and swelling due to abscess   Skin: Positive for wound (abscess to left forearm). Large abscess left fore arm. \"Developed from a prior cyst\" that was present for a long time. Neurological: Negative. Hematological: Negative. Psychiatric/Behavioral: Negative. All other systems reviewed and are negative. Physical Exam     Visit Vitals    /80 (BP 1 Location: Right arm, BP Patient Position: Sitting)    Pulse 67    Temp 98.2 °F (36.8 °C)    Resp 18    Ht 5' 8\" (1.727 m)    Wt 116.6 kg (257 lb)    SpO2 97%    BMI 39.08 kg/m2         Physical Exam   Constitutional: He appears well-developed and well-nourished. No distress. HENT:   Head: Normocephalic. Eyes: Conjunctivae and EOM are normal. Pupils are equal, round, and reactive to light. Right eye exhibits no discharge. Left eye exhibits no discharge. No scleral icterus. Neck: Normal range of motion. Neck supple. No tracheal deviation present. No thyromegaly present. Cardiovascular: Normal rate, regular rhythm and normal heart sounds. Exam reveals no gallop and no friction rub. No murmur heard. Pulmonary/Chest: Effort normal and breath sounds normal. No stridor. No respiratory distress. He has no wheezes. He has no rales. He exhibits no tenderness. Abdominal: Soft. There is no tenderness. Musculoskeletal: He exhibits no edema or tenderness. Lymphadenopathy:     He has no cervical adenopathy. Neurological: He is alert. He has normal reflexes. No cranial nerve deficit. Coordination normal.   Skin: Skin is warm. Large infected cyst left fore arm. 6x8 cms. Psychiatric: He has a normal mood and affect. His behavior is normal. Judgment and thought content normal.         Diagnostic Study Results     Labs -  Recent Results (from the past 12 hour(s))   METABOLIC PANEL, COMPREHENSIVE    Collection Time: 02/03/18 11:45 AM   Result Value Ref Range    Sodium 143 136 - 145 mmol/L    Potassium 3.8 3.5 - 5.5 mmol/L    Chloride 103 100 - 108 mmol/L    CO2 32 21 - 32 mmol/L    Anion gap 8 3.0 - 18 mmol/L    Glucose 131 (H) 74 - 99 mg/dL    BUN 13 7.0 - 18 MG/DL    Creatinine 1.32 (H) 0.6 - 1.3 MG/DL    BUN/Creatinine ratio 10 (L) 12 - 20      GFR est AA >60 >60 ml/min/1.73m2    GFR est non-AA 53 (L) >60 ml/min/1.73m2    Calcium 8.6 8.5 - 10.1 MG/DL    Bilirubin, total 0.8 0.2 - 1.0 MG/DL    ALT (SGPT) 24 16 - 61 U/L    AST (SGOT) 31 15 - 37 U/L    Alk.  phosphatase 100 45 - 117 U/L    Protein, total 7.0 6.4 - 8.2 g/dL    Albumin 3.3 (L) 3.4 - 5.0 g/dL    Globulin 3.7 2.0 - 4.0 g/dL    A-G Ratio 0.9 0.8 - 1.7     CBC W/O DIFF    Collection Time: 02/03/18 11:45 AM   Result Value Ref Range    WBC 4.6 4.6 - 13.2 K/uL    RBC 4.40 (L) 4.70 - 5.50 M/uL    HGB 12.8 (L) 13.0 - 16.0 g/dL    HCT 40.3 36.0 - 48.0 %    MCV 91.6 74.0 - 97.0 FL    MCH 29.1 24.0 - 34.0 PG    MCHC 31.8 31.0 - 37.0 g/dL    RDW 13.1 11.6 - 14.5 % PLATELET 818 419 - 806 K/uL    MPV 10.5 9.2 - 11.8 FL       Radiologic Studies -   No orders to display         Medical Decision Making   I am the first provider for this patient. I reviewed the vital signs, available nursing notes, past medical history, past surgical history, family history and social history. Vital Signs-Reviewed the patient's vital signs. Pulse Oximetry Analysis -  97%  on room air, normal     Records Reviewed: Nursing Notes (Time of Review: 11:29 AM)    ED Course: Progress Notes, Reevaluation, and Consults:      Provider Notes (Medical Decision Making): Infected sebaceous cyst with cellulitis. Procedures: I&D Abcess Simple  Date/Time: 2/3/2018 1:23 PM  Performed by: Acacia Madsen  Authorized by: Acacia Madsen     Consent:     Consent obtained:  Verbal    Consent given by:  Patient    Risks discussed:  Bleeding, pain, damage to other organs and infection    Alternatives discussed:  Alternative treatment  Location:     Type:  Abscess    Location:  Upper extremity    Upper extremity location:  Arm  Pre-procedure details:     Skin preparation:  Betadine  Anesthesia (see MAR for exact dosages): Anesthesia method:  Local infiltration    Local anesthetic:  Lidocaine 1% WITH epi  Procedure type:     Complexity:  Complex  Procedure details:     Needle aspiration: no      Incision types:  Single straight    Incision depth:  Subcutaneous    Scalpel blade:  15    Wound management:  Probed and deloculated and extensive cleaning    Drainage:  Bloody and purulent    Drainage amount:  Copious    Wound treatment:  Drain placed    Packing materials:  1/2 in gauze and 1/2 in iodoform gauze  Post-procedure details:     Patient tolerance of procedure: Tolerated well, no immediate complications              Diagnosis     Clinical Impression:   1. Sebaceous cyst    2. Abscess of arm, left        Disposition: Discharged . Packing removal Tuesday. Rx Augmentin Bid.     Follow-up Information None           Patient's Medications   Start Taking    AMOXICILLIN-CLAVULANATE (AUGMENTIN) 875-125 MG PER TABLET    Take 1 Tab by mouth two (2) times a day for 10 days. Continue Taking    ALPRAZOLAM (XANAX) 0.25 MG TABLET    TAKE 1 TABLET BY MOUTH 3 TIMES DAILY AS NEEDED FOR ANXIETY. MAX DAILY AMOUNT IS 0.75MG (3 TABLETS)    AMLODIPINE (NORVASC) 5 MG TABLET    take 1 tablet by mouth once daily    ASPIRIN 81 MG CHEWABLE TABLET    Take 1 Tab by mouth daily. CHOLECALCIFEROL (VITAMIN D3) 1,000 UNIT TABLET    Take 1,000 Units by mouth daily. CLOTRIMAZOLE-BETAMETHASONE (LOTRISONE) TOPICAL CREAM    Apply pea size affected area BID for 1 week    DONEPEZIL (ARICEPT) 23 MG FILM COATED TABLET        DOXYCYCLINE (VIBRA-TABS) 100 MG TABLET    Take 100 mg by mouth two (2) times a day. ESCITALOPRAM OXALATE (LEXAPRO) 10 MG TABLET    TAKE 1 TABLET BY MOUTH ONCE DAILY    KETOCONAZOLE (NIZORAL) 2 % TOPICAL CREAM    Apply  to affected area daily. LEVETIRACETAM (KEPPRA) 250 MG TABLET    Take 1 Tab by mouth two (2) times a day. MEMANTINE (NAMENDA) 10 MG TABLET    TAKE 1 TABLET BY MOUTH TWICE DAILY    METOPROLOL SUCCINATE (TOPROL-XL) 50 MG XL TABLET    TAKE 1 TABLET BY MOUTH ONCE DAILY    MULTIVITAMIN, TX-IRON-CA-MIN (THERA-M W/ IRON) 9 MG IRON-400 MCG TAB TABLET    Take 1 Tab by mouth daily. OTHER    Compound Cream  Gabapentin 6%, Baclofen 2%, Cyclobenzaprine 2%, Lidocaine 2%, Flurbiprofen 10% with Ketamine 10%  Apply 1-2 grams (1-2 pumps) to affected area 3-4 times  120grams/2 refills    OTHER    Check CBC, CMP, Mg in 5 days, results to PCP immediately, Diagnosis- HTN    PANTOPRAZOLE (PROTONIX) 40 MG TABLET    TAKE 1 TAB BY MOUTH DAILY.     QUETIAPINE (SEROQUEL) 50 MG TABLET    TAKE 1 TABLET BY MOUTH ONCE DAILY AT BEDTIME    ROSUVASTATIN (CRESTOR) 5 MG TABLET    TAKE 1 TABLET BY MOUTH AT BEDTIME IF NEEDED    TRAMADOL (ULTRAM) 50 MG TABLET    TAKE 2 TABLETS BY MOUTH EVERY 6 HOURS IF NEEDED(DO NOT TAKE MORE THAN 8 TABLETS A DAY)    VALSARTAN (DIOVAN) 160 MG TABLET    Take 1 Tab by mouth daily. These Medications have changed    No medications on file   Stop Taking    No medications on file     _______________________________    Attestations:  [address] [place] [address] acting as a scribe for and in the presence of Rashida Carolina MD      February 03, 2018 at [facility] PM       Provider Attestation:      I personally performed the services described in the documentation, reviewed the documentation, as recorded by the scribe in my presence, and it accurately and completely records my words and actions.  February 03, 2018 at 1:28 PM - Rashida Carolina MD    _______________________________

## 2018-02-03 NOTE — ED TRIAGE NOTES
Pt presents to the ED with left forearm abscess onset x5 days. Pt reports seen at [facility] x2 days ago, prescribed doxycycline 100 mg to treat for \"cyst.\" Pt reports initially having a cyst to the left arm for x1 year. Pt rates pain 8/10.  Pt appears in NOAD

## 2018-02-03 NOTE — ED NOTES
Pt states ready for discharge. Pt states he will follow up with PCP as instructed by provider. Pt appears in NOAD. I have reviewed discharge instructions with the patient. Prescriptions were reviewed with patient instructed not to drink alcohol, drive a car, or operate heavy machinery while taking this medicine. The patient verbalized understanding. Patient seen leaving ED ambulatory without difficulty or need for assistance, with S/O in no sign of distress. Patient armband removed and shredded    Current Discharge Medication List      START taking these medications    Details   amoxicillin-clavulanate (AUGMENTIN) 875-125 mg per tablet Take 1 Tab by mouth two (2) times a day for 10 days.   Qty: 20 Tab, Refills: 0

## 2018-02-05 ENCOUNTER — PATIENT OUTREACH (OUTPATIENT)
Dept: FAMILY MEDICINE CLINIC | Age: 78
End: 2018-02-05

## 2018-02-05 RX ORDER — DOXYCYCLINE 100 MG/1
100 CAPSULE ORAL 2 TIMES DAILY
COMMUNITY
End: 2018-02-09 | Stop reason: SDUPTHER

## 2018-02-05 NOTE — PROGRESS NOTES
Patient admitted to [facility] ED, 2/3/18 for sebaceous cyst to left forearm. Presenting symptoms:   Cyst to left foream  New medications/changes to current medications:  Augmentin  Patient was [facility] and prescribed Doxycycline  Patient had I&D of cyst at [facility] Ed and was told to return in 72hr for packing to be removed  ED utilization in past 6 months: 1    Contacted patient for ED follow up. Verified 2 patient identifiers. Introduced self, role and reason for call. Patient reports:  Dressing to left forearm. Arm in sling  Patient denies:  Excessive drainage or drainage with an odor  Denies severe pain  Support:   spouse    Educated patient to monitor and report the following Red flags: fever, chills, foul smelling drainage or any new or concerning symptoms. Patient verbalized understanding of information discussed and is aware of  when to seek medical attention from PCP, urgent care or ED. Reviewed new medications or changes to previous medications and allergies reviewed. Instructed to bring all medications or list of medications with him to next appointment. Opportunity to ask questions was provided. Contact information was provided for future reference or further questions. Appointment(s):  LORI Carter  2/12/18 on  at 1500  Patient aware. Spouse  will provide transportation.

## 2018-02-06 ENCOUNTER — HOSPITAL ENCOUNTER (EMERGENCY)
Age: 78
Discharge: HOME | End: 2018-02-06
Attending: EMERGENCY MEDICINE
Payer: MEDICARE

## 2018-02-06 VITALS
DIASTOLIC BLOOD PRESSURE: 97 MMHG | WEIGHT: 250 LBS | HEIGHT: 70 IN | TEMPERATURE: 98.6 F | OXYGEN SATURATION: 97 % | HEART RATE: 63 BPM | SYSTOLIC BLOOD PRESSURE: 183 MMHG | BODY MASS INDEX: 35.79 KG/M2 | RESPIRATION RATE: 18 BRPM

## 2018-02-06 DIAGNOSIS — Z48.00 ABSCESS PACKING REMOVAL: ICD-10-CM

## 2018-02-06 DIAGNOSIS — Z51.89 WOUND CHECK, ABSCESS: Primary | ICD-10-CM

## 2018-02-06 PROCEDURE — 99282 EMERGENCY DEPT VISIT SF MDM: CPT

## 2018-02-06 NOTE — ED PROVIDER NOTES
[address] EMERGENCY DEPT      3:15 PM    Date: 2/6/2018  Patient Name: Tre Pace.    History of Presenting Illness     Chief Complaint   Patient presents with    Wound Check       68 y.o. male with noted past medical history who presents to the emergency department for a wound recheck. Pt notes having his left forearm abscess drained on 2/03/18. He is compliant with the Augmentin and states he feels like it has been betting better. Notes some slight constant aching to the site. He denies any fever, chills, numbness, weakness, abnormal bleeding/bruising, or other symptoms at this time. No other complaints. Nursing nurses regarding the HPI and triage nursing notes were reviewed. Prior medical records were reviewed. Current Outpatient Prescriptions   Medication Sig Dispense Refill    doxycycline (MONODOX) 100 mg capsule Take 100 mg by mouth two (2) times a day.  doxycycline (VIBRA-TABS) 100 mg tablet Take 100 mg by mouth two (2) times a day.  amoxicillin-clavulanate (AUGMENTIN) 875-125 mg per tablet Take 1 Tab by mouth two (2) times a day for 10 days. 20 Tab 0    pantoprazole (PROTONIX) 40 mg tablet TAKE 1 TAB BY MOUTH DAILY. 30 Tab 2    ALPRAZolam (XANAX) 0.25 mg tablet TAKE 1 TABLET BY MOUTH 3 TIMES DAILY AS NEEDED FOR ANXIETY. MAX DAILY AMOUNT IS 0.75MG (3 TABLETS) 90 Tab 2    traMADol (ULTRAM) 50 mg tablet TAKE 2 TABLETS BY MOUTH EVERY 6 HOURS IF NEEDED(DO NOT TAKE MORE THAN 8 TABLETS A DAY) 60 Tab 3    rosuvastatin (CRESTOR) 5 mg tablet TAKE 1 TABLET BY MOUTH AT BEDTIME IF NEEDED 30 Tab 4    QUEtiapine (SEROQUEL) 50 mg tablet TAKE 1 TABLET BY MOUTH ONCE DAILY AT BEDTIME 30 Tab 3    ketoconazole (NIZORAL) 2 % topical cream Apply  to affected area daily.  valsartan (DIOVAN) 160 mg tablet Take 1 Tab by mouth daily.  30 Tab 5    clotrimazole-betamethasone (LOTRISONE) topical cream Apply pea size affected area BID for 1 week 45 g 2    metoprolol succinate (TOPROL-XL) 50 mg XL tablet TAKE 1 TABLET BY MOUTH ONCE DAILY 30 Tab 5    escitalopram oxalate (LEXAPRO) 10 mg tablet TAKE 1 TABLET BY MOUTH ONCE DAILY 30 Tab 5    memantine (NAMENDA) 10 mg tablet TAKE 1 TABLET BY MOUTH TWICE DAILY 60 Tab 3    amLODIPine (NORVASC) 5 mg tablet take 1 tablet by mouth once daily 30 Tab 5    levETIRAcetam (KEPPRA) 250 mg tablet Take 1 Tab by mouth two (2) times a day. 60 Tab 5    cholecalciferol (VITAMIN D3) 1,000 unit tablet Take 1,000 Units by mouth daily.  donepezil (ARICEPT) 23 mg film coated tablet   0    aspirin 81 mg chewable tablet Take 1 Tab by mouth daily. 30 Tab 0    OTHER Check CBC, CMP, Mg in 5 days, results to PCP immediately, Diagnosis- HTN 1 Each 0    multivitamin, tx-iron-ca-min (THERA-M W/ IRON) 9 mg iron-400 mcg tab tablet Take 1 Tab by mouth daily.  OTHER Compound Cream  Gabapentin 6%, Baclofen 2%, Cyclobenzaprine 2%, Lidocaine 2%, Flurbiprofen 10% with Ketamine 10%  Apply 1-2 grams (1-2 pumps) to affected area 3-4 times  120grams/2 refills         Past History     Past Medical History:  Past Medical History:   Diagnosis Date    Anxiety     Arthritis     Cancer [facility] [address] 2009    bladder     Carotid duplex 05/26/2011    No occlusive disease >49% bilaterally.     Chronic back pain 5/6/2010    Chronic traumatic encephalopathy     Dementia     Depression     he has taken Xanax for 16 years for this    Diabetes ([facility] [address]     Dizziness     ED (erectile dysfunction)     Fibrosis of knee joint     Peripatellar, right    Headache(784.0)     HTN (hypertension), benign 4/1/2010    Late onset Alzheimer's disease with behavioral disturbance     Left wrist pain     Lumbar spinal stenosis 9/25/2010    Neuropathy of lower extremity     Obesity     Osteoarthritis of both knees     Sleep apnea     cpap use    SOB (shortness of breath)     Vertigo, benign positional        Past Surgical History:  Past Surgical History:   Procedure Laterality Date    BIOPSY  12/19/2007    HX OTHER SURGICAL  11/2014    right knee    HX UROLOGICAL      Bladder CA       Family History:  Family History   Problem Relation Age of Onset    Hypertension Mother     Heart Disease Mother     Other Mother      Alzheimer's disease    Diabetes Neg Hx        Social History:  Social History   Substance Use Topics    Smoking status: Former Smoker    Smokeless tobacco: Never Used      Comment: quit smoking in 2000    Alcohol use No       Allergies: Allergies   Allergen Reactions    Chlorhexidine Towelette Itching       Patient's primary care provider (as noted in EPIC):  Orvel Fleischer, MD    Constitutional:  Denies malaise, fever, chills. Extremity/MS:  + left arm wound/pain. Neuro:  Denies neurologic symptoms/deficits/paresthesias. Skin: See extremity/ms. All other systems negative as reviewed. Visit Vitals    BP (!) 183/97 (BP 1 Location: Right arm, BP Patient Position: At rest)    Pulse 63    Temp 98.6 °F (37 °C)    Resp 18    Ht 5' 10\" (1.778 m)    Wt 113.4 kg (250 lb)    SpO2 97%    BMI 35.87 kg/m2       PHYSICAL EXAM:    CONSTITUTIONAL:  Alert, in no apparent distress;  well developed;  well nourished. HEAD:  Normocephalic, atraumatic. EYES:  EOMI. Non-icteric sclera. Normal conjunctiva. ENTM: Mouth: mucous membranes moist.  NECK: Supple  RESPIRATORY:  Chest clear, equal breath sounds, good air movement. Without wheezes, rhonchi or rales. CARDIOVASCULAR:  Regular rate and rhythm. No murmurs, rubs, or gallops. UPPER EXT:  See skin; NVI distally. NEURO:  Moves all four extremities, and grossly normal motor exam.  SKIN:  Left forearm with 2cm open wound with packing in place, mild surrounding edema; without surrounding erythema or induration. PSYCH:  Alert and normal affect.     ED COURSE:      IMPRESSION AND MEDICAL DECISION MAKING:  Based upon the patient's presentation with noted HPI and PE, along with the work up done in the emergency department, I believe that the patient's wound looks well. Packing was removed without any difficulty. Pt washed out the wound and it was dressed with a bulky dressing. Pt given instructions on warm compresses, daily dressing changes, c/w Augmentin as prescribed and PCP f/u in 3 days. The patient appears nontoxic at time of discharge. Diagnosis:   1. Wound check, abscess    2. Abscess packing removal      Disposition: Discharge    Follow-up Information     Follow up With Details Comments MD Jennifer In 3 days  [address] [place] [id number]  [address] [place] [address] EMERGENCY DEPT  If symptoms worsen [address] [facility]  [phone]          Patient's Medications   Start Taking    No medications on file   Continue Taking    ALPRAZOLAM (XANAX) 0.25 MG TABLET    TAKE 1 TABLET BY MOUTH 3 TIMES DAILY AS NEEDED FOR ANXIETY. MAX DAILY AMOUNT IS 0.75MG (3 TABLETS)    AMLODIPINE (NORVASC) 5 MG TABLET    take 1 tablet by mouth once daily    AMOXICILLIN-CLAVULANATE (AUGMENTIN) 875-125 MG PER TABLET    Take 1 Tab by mouth two (2) times a day for 10 days. ASPIRIN 81 MG CHEWABLE TABLET    Take 1 Tab by mouth daily. CHOLECALCIFEROL (VITAMIN D3) 1,000 UNIT TABLET    Take 1,000 Units by mouth daily. CLOTRIMAZOLE-BETAMETHASONE (LOTRISONE) TOPICAL CREAM    Apply pea size affected area BID for 1 week    DONEPEZIL (ARICEPT) 23 MG FILM COATED TABLET        DOXYCYCLINE (MONODOX) 100 MG CAPSULE    Take 100 mg by mouth two (2) times a day. DOXYCYCLINE (VIBRA-TABS) 100 MG TABLET    Take 100 mg by mouth two (2) times a day. ESCITALOPRAM OXALATE (LEXAPRO) 10 MG TABLET    TAKE 1 TABLET BY MOUTH ONCE DAILY    KETOCONAZOLE (NIZORAL) 2 % TOPICAL CREAM    Apply  to affected area daily. LEVETIRACETAM (KEPPRA) 250 MG TABLET    Take 1 Tab by mouth two (2) times a day.     MEMANTINE (NAMENDA) 10 MG TABLET    TAKE 1 TABLET BY MOUTH TWICE DAILY    METOPROLOL SUCCINATE (TOPROL-XL) 50 MG XL TABLET    TAKE 1 TABLET BY MOUTH ONCE DAILY    MULTIVITAMIN, TX-IRON-CA-MIN (THERA-M W/ IRON) 9 MG IRON-400 MCG TAB TABLET    Take 1 Tab by mouth daily. OTHER    Compound Cream  Gabapentin 6%, Baclofen 2%, Cyclobenzaprine 2%, Lidocaine 2%, Flurbiprofen 10% with Ketamine 10%  Apply 1-2 grams (1-2 pumps) to affected area 3-4 times  120grams/2 refills    OTHER    Check CBC, CMP, Mg in 5 days, results to PCP immediately, Diagnosis- HTN    PANTOPRAZOLE (PROTONIX) 40 MG TABLET    TAKE 1 TAB BY MOUTH DAILY. QUETIAPINE (SEROQUEL) 50 MG TABLET    TAKE 1 TABLET BY MOUTH ONCE DAILY AT BEDTIME    ROSUVASTATIN (CRESTOR) 5 MG TABLET    TAKE 1 TABLET BY MOUTH AT BEDTIME IF NEEDED    TRAMADOL (ULTRAM) 50 MG TABLET    TAKE 2 TABLETS BY MOUTH EVERY 6 HOURS IF NEEDED(DO NOT TAKE MORE THAN 8 TABLETS A DAY)    VALSARTAN (DIOVAN) 160 MG TABLET    Take 1 Tab by mouth daily.    These Medications have changed    No medications on file   Stop Taking    No medications on file     LORI Bravo

## 2018-02-06 NOTE — ED NOTES
I have reviewed discharge instructions with the patient and spouse. The patient and spouse verbalized understanding. Patient armband removed and shredded  Current Discharge Medication List

## 2018-02-09 ENCOUNTER — OFFICE VISIT (OUTPATIENT)
Dept: INTERNAL MEDICINE CLINIC | Age: 78
End: 2018-02-09

## 2018-02-09 VITALS
SYSTOLIC BLOOD PRESSURE: 160 MMHG | HEIGHT: 70 IN | BODY MASS INDEX: 36.36 KG/M2 | TEMPERATURE: 98.7 F | DIASTOLIC BLOOD PRESSURE: 94 MMHG | WEIGHT: 254 LBS | RESPIRATION RATE: 14 BRPM | OXYGEN SATURATION: 97 % | HEART RATE: 70 BPM

## 2018-02-09 DIAGNOSIS — Z51.89 WOUND CHECK, ABSCESS: ICD-10-CM

## 2018-02-09 DIAGNOSIS — Z09 HOSPITAL DISCHARGE FOLLOW-UP: Primary | ICD-10-CM

## 2018-02-09 LAB
BACTERIA SPEC CULT: ABNORMAL
BACTERIA SPEC CULT: ABNORMAL
GRAM STN SPEC: ABNORMAL
GRAM STN SPEC: ABNORMAL
SERVICE CMNT-IMP: ABNORMAL

## 2018-02-09 NOTE — PROGRESS NOTES
TRANSITIONS OF CARE FACE-TO-FACE VISIT  INTERNISTS [facility][note 3]:  2/9/2018, MRN: [id number]  Chief Complaint   Patient presents with    Wound Check     left lower arm, prior cyst removed on 2/3/18, current treatment is cleaning would warm soap and water and changing bandage daily         Lorena Agn Sr. is a 68 y.o. male and presents to clinic after recent hospitalization. Subjective:   Discharged from: [facility] ER    Followed up by the Nurse Navigator Perry Castleman on 2/7/18 on. Summary of hospitalization problems/diagnoses: Mr. Macey Mejia presents today with his wife. He presented to [facility] ED on 2/3/18. He was diagnosed with an abscess to the left forearm due to underlying sebaceous cyst. He did present to an urgent care prior to the hospital visit and was given Doxycycline x 10 days, they did not perform an I&D. He received an I&D on 2/3/18, was sent home on Augmentin and told to follow up on 2/6/18 so the packing could be removed. He presented on 2/6/18 for a wound check and the packing was removed. Wound culture with Gram + . His wife reports a [address] presented to the house about 2 weeks ago and told her they would put in for [place] [address] wound care however this was never completed, patient wife requesting today. He has 2 days left on Doxycycline and continues to take the Augmentin as prescribed as well. Current Outpatient Prescriptions on File Prior to Visit   Medication Sig Dispense Refill    doxycycline (VIBRA-TABS) 100 mg tablet Take 100 mg by mouth two (2) times a day.  amoxicillin-clavulanate (AUGMENTIN) 875-125 mg per tablet Take 1 Tab by mouth two (2) times a day for 10 days. 20 Tab 0    pantoprazole (PROTONIX) 40 mg tablet TAKE 1 TAB BY MOUTH DAILY. 30 Tab 2    ALPRAZolam (XANAX) 0.25 mg tablet TAKE 1 TABLET BY MOUTH 3 TIMES DAILY AS NEEDED FOR ANXIETY.  MAX DAILY AMOUNT IS 0.75MG (3 TABLETS) 90 Tab 2    traMADol (ULTRAM) 50 mg tablet TAKE 2 TABLETS BY MOUTH EVERY 6 HOURS IF NEEDED(DO NOT TAKE MORE THAN 8 TABLETS A DAY) 60 Tab 3    rosuvastatin (CRESTOR) 5 mg tablet TAKE 1 TABLET BY MOUTH AT BEDTIME IF NEEDED 30 Tab 4    QUEtiapine (SEROQUEL) 50 mg tablet TAKE 1 TABLET BY MOUTH ONCE DAILY AT BEDTIME 30 Tab 3    ketoconazole (NIZORAL) 2 % topical cream Apply  to affected area daily.  valsartan (DIOVAN) 160 mg tablet Take 1 Tab by mouth daily. 30 Tab 5    clotrimazole-betamethasone (LOTRISONE) topical cream Apply pea size affected area BID for 1 week 45 g 2    metoprolol succinate (TOPROL-XL) 50 mg XL tablet TAKE 1 TABLET BY MOUTH ONCE DAILY 30 Tab 5    escitalopram oxalate (LEXAPRO) 10 mg tablet TAKE 1 TABLET BY MOUTH ONCE DAILY 30 Tab 5    memantine (NAMENDA) 10 mg tablet TAKE 1 TABLET BY MOUTH TWICE DAILY 60 Tab 3    levETIRAcetam (KEPPRA) 250 mg tablet Take 1 Tab by mouth two (2) times a day. 60 Tab 5    cholecalciferol (VITAMIN D3) 1,000 unit tablet Take 1,000 Units by mouth daily.  donepezil (ARICEPT) 23 mg film coated tablet   0    aspirin 81 mg chewable tablet Take 1 Tab by mouth daily. 30 Tab 0    OTHER Check CBC, CMP, Mg in 5 days, results to PCP immediately, Diagnosis- HTN 1 Each 0    multivitamin, tx-iron-ca-min (THERA-M W/ IRON) 9 mg iron-400 mcg tab tablet Take 1 Tab by mouth daily.  OTHER Compound Cream  Gabapentin 6%, Baclofen 2%, Cyclobenzaprine 2%, Lidocaine 2%, Flurbiprofen 10% with Ketamine 10%  Apply 1-2 grams (1-2 pumps) to affected area 3-4 times  120grams/2 refills       No current facility-administered medications on file prior to visit.         Medication changes: yes  Medication list updated:  yes  Needs referral or labs:  Tristan Youssef wound care, yes  Treatment Barriers: no      Patient Active Problem List    Diagnosis Date Noted    Troponin level elevated 03/31/2016    ACS (acute coronary syndrome) ([facility] [address] 03/30/2016    ACP (advance care planning) 02/24/2016    Late onset Alzheimer's disease with behavioral disturbance 02/24/2016    CTE (chronic traumatic encephalopathy) 02/08/2016    Status post total right knee replacement 02/08/2016    Primary osteoarthritis of left knee 02/08/2016    Anemia 08/16/2015    GI bleed due to NSAIDs 08/16/2015    Osteoarthritis of right knee 11/04/2014    Dyslipidemia 10/14/2013    Dementia 07/22/2012    SOB (shortness of breath)     Dizziness     Borderline high cholesterol 06/11/2012    Malignant neoplasm of bladder, part unspecified 05/04/2012    Urinary system symptoms, other 05/04/2012    DM (diabetes mellitus) ([facility] [address] 01/24/2012    Memory loss     Fatigue 04/10/2011    Anxiety 01/21/2011    High triglycerides 09/25/2010    Lumbar spinal stenosis 09/25/2010    PN (peripheral neuropathy) 09/01/2010    Chronic back pain 05/06/2010    HTN (hypertension), benign 04/01/2010    Headache     Depression     ED (erectile dysfunction)     Vertigo, benign positional     Sleep apnea        Current Outpatient Prescriptions   Medication Sig Dispense Refill    doxycycline (VIBRA-TABS) 100 mg tablet Take 100 mg by mouth two (2) times a day.  amoxicillin-clavulanate (AUGMENTIN) 875-125 mg per tablet Take 1 Tab by mouth two (2) times a day for 10 days. 20 Tab 0    pantoprazole (PROTONIX) 40 mg tablet TAKE 1 TAB BY MOUTH DAILY. 30 Tab 2    ALPRAZolam (XANAX) 0.25 mg tablet TAKE 1 TABLET BY MOUTH 3 TIMES DAILY AS NEEDED FOR ANXIETY. MAX DAILY AMOUNT IS 0.75MG (3 TABLETS) 90 Tab 2    traMADol (ULTRAM) 50 mg tablet TAKE 2 TABLETS BY MOUTH EVERY 6 HOURS IF NEEDED(DO NOT TAKE MORE THAN 8 TABLETS A DAY) 60 Tab 3    rosuvastatin (CRESTOR) 5 mg tablet TAKE 1 TABLET BY MOUTH AT BEDTIME IF NEEDED 30 Tab 4    QUEtiapine (SEROQUEL) 50 mg tablet TAKE 1 TABLET BY MOUTH ONCE DAILY AT BEDTIME 30 Tab 3    ketoconazole (NIZORAL) 2 % topical cream Apply  to affected area daily.  valsartan (DIOVAN) 160 mg tablet Take 1 Tab by mouth daily.  30 Tab 5    clotrimazole-betamethasone (LOTRISONE) topical cream Apply pea size affected area BID for 1 week 45 g 2    metoprolol succinate (TOPROL-XL) 50 mg XL tablet TAKE 1 TABLET BY MOUTH ONCE DAILY 30 Tab 5    escitalopram oxalate (LEXAPRO) 10 mg tablet TAKE 1 TABLET BY MOUTH ONCE DAILY 30 Tab 5    memantine (NAMENDA) 10 mg tablet TAKE 1 TABLET BY MOUTH TWICE DAILY 60 Tab 3    levETIRAcetam (KEPPRA) 250 mg tablet Take 1 Tab by mouth two (2) times a day. 60 Tab 5    cholecalciferol (VITAMIN D3) 1,000 unit tablet Take 1,000 Units by mouth daily.  donepezil (ARICEPT) 23 mg film coated tablet   0    aspirin 81 mg chewable tablet Take 1 Tab by mouth daily. 30 Tab 0    OTHER Check CBC, CMP, Mg in 5 days, results to PCP immediately, Diagnosis- HTN 1 Each 0    multivitamin, tx-iron-ca-min (THERA-M W/ IRON) 9 mg iron-400 mcg tab tablet Take 1 Tab by mouth daily.  OTHER Compound Cream  Gabapentin 6%, Baclofen 2%, Cyclobenzaprine 2%, Lidocaine 2%, Flurbiprofen 10% with Ketamine 10%  Apply 1-2 grams (1-2 pumps) to affected area 3-4 times  120grams/2 refills         Allergies   Allergen Reactions    Chlorhexidine Towelette Itching       Past Medical History:   Diagnosis Date    Anxiety     Arthritis     Cancer ([facility] [address] 2009    bladder     Carotid duplex 05/26/2011    No occlusive disease >49% bilaterally.     Chronic back pain 5/6/2010    Chronic traumatic encephalopathy     Dementia     Depression     he has taken Xanax for 16 years for this    Diabetes ([facility] [address]     Dizziness     ED (erectile dysfunction)     Fibrosis of knee joint     Peripatellar, right    Headache(784.0)     HTN (hypertension), benign 4/1/2010    Late onset Alzheimer's disease with behavioral disturbance     Left wrist pain     Lumbar spinal stenosis 9/25/2010    Neuropathy of lower extremity     Obesity     Osteoarthritis of both knees     Sleep apnea     cpap use    SOB (shortness of breath)     Vertigo, benign positional        Past Surgical History:   Procedure Laterality Date    BIOPSY  12/19/2007    HX OTHER SURGICAL  11/2014    right knee    HX UROLOGICAL      Bladder CA       Family History   Problem Relation Age of Onset    Hypertension Mother     Heart Disease Mother     Other Mother      Alzheimer's disease    Diabetes Neg Hx        Social History   Substance Use Topics    Smoking status: Former Smoker    Smokeless tobacco: Never Used      Comment: quit smoking in 2000    Alcohol use No       ROS   Review of Systems   Constitutional: Negative for chills, fever and malaise/fatigue. Eyes: Negative. Cardiovascular: Negative for chest pain, palpitations and leg swelling. Gastrointestinal: Negative for constipation, diarrhea, nausea and vomiting. Neurological: Negative for dizziness, sensory change, speech change and headaches. Objective     Vitals:    02/09/18 1327   BP: (!) 160/94   Pulse: 70   Resp: 14   Temp: 98.7 °F (37.1 °C)   TempSrc: Oral   SpO2: 97%   Weight: 254 lb (115.2 kg)   Height: 5' 10\" (1.778 m)   PainSc:   0 - No pain       Physical Exam   Constitutional: He is well-developed, well-nourished, and in no distress. No distress. Very pleasant   Cardiovascular: Normal rate, regular rhythm, normal heart sounds and intact distal pulses. Pulmonary/Chest: Effort normal and breath sounds normal.   Musculoskeletal: Normal range of motion. He exhibits no edema or tenderness. Neurological: He is alert. Skin: Skin is warm and dry. No rash noted. He is not diaphoretic. No erythema. No pallor.         Psychiatric: Mood, memory, affect and judgment normal.       LABS   Data Review:   Lab Results   Component Value Date/Time    WBC 4.6 02/03/2018 11:45 AM    HGB 12.8 (L) 02/03/2018 11:45 AM    HCT 40.3 02/03/2018 11:45 AM    PLATELET 873 98/20/7970 11:45 AM    MCV 91.6 02/03/2018 11:45 AM       Lab Results   Component Value Date/Time    Sodium 143 02/03/2018 11:45 AM    Potassium 3.8 02/03/2018 11:45 AM    Chloride 103 02/03/2018 11:45 AM    CO2 32 02/03/2018 11:45 AM    Anion gap 8 02/03/2018 11:45 AM    Glucose 131 (H) 02/03/2018 11:45 AM    BUN 13 02/03/2018 11:45 AM    Creatinine 1.32 (H) 02/03/2018 11:45 AM    BUN/Creatinine ratio 10 (L) 02/03/2018 11:45 AM    GFR est AA >60 02/03/2018 11:45 AM    GFR est non-AA 53 (L) 02/03/2018 11:45 AM    Calcium 8.6 02/03/2018 11:45 AM       Lab Results   Component Value Date/Time    Cholesterol, total 117 12/29/2017 08:01 AM    HDL Cholesterol 41 12/29/2017 08:01 AM    LDL, calculated 41.2 12/29/2017 08:01 AM    VLDL, calculated 34.8 12/29/2017 08:01 AM    Triglyceride 174 (H) 12/29/2017 08:01 AM    CHOL/HDL Ratio 2.9 12/29/2017 08:01 AM       Lab Results   Component Value Date/Time    Hemoglobin A1c 5.9 (H) 12/29/2017 08:01 AM       Assessment/Plan:   Lab review: Would culture 2/3/18  MANY DIPHTHEROIDS (TWO MORPHOTYPES) (A)   Culture result:    Preliminary   POSSIBLE ANAEROBIC GRAM POSITIVE COCCI ISOLATED FROM BROTH ONLY (A)       Referrals: ordered  Complexity: mild    Community resources identified for patient: home health agency  Durable Medical Equipment: none needed    There are no diagnoses linked to this encounter. A. Key points we discussed today:  1. Pt instructed to call our office if symptoms worsen or if the pt/caregiver has any questions. 2. Handout given- regarding BP medication changes, Follow up with [facility] 2/12/18. 3. Left forearm wound- visualized wound today, changed dressing. Follow up with [facility] 2/12/18. Orders Placed This Encounter    REFERRAL TO WOUND CARE     Referral Priority:   Routine     Referral Type:   Consultation     Referral Reason:   Specialty Services Required       B. New labs/medications ordered today:   1. A new medication list was given to the patient/family/caregiver. 2. Hypertension- Recommended change in BP medication today. He is asymptomatic today, BP elevated over last three visits.  Norvasc 5 mg daily (take 2 tabs of the 5 mg daily), Valsartan 160mg daily (take 240 mg daily) take over the next two days, record BP at home and bring to follow up on 2/12/18. The medication list has been updated. A new medication list was given today to the patient. I have discussed the diagnosis with the patient and the intended plan as seen in the above orders. The patient has received an after-visit summary and questions were answered concerning future plans. I have discussed medication side effects and warnings with the patient as well. I have reviewed the plan of care with the patient, accepted their input and they are in agreement with the treatment goals. All questions were answered. The patient understands the plan of care. Handouts provided today with above information. Pt instructed if symptoms worsen to call the office or report to the ED for continued care. Greater than 50% of the visit time was spent in counseling and/or coordination of care.      Follow-up Disposition: Not on File      PETE Pate  Internist of [address], [address] [place] [address].  Phone: [phone]  Fax: [phone]

## 2018-02-09 NOTE — PROGRESS NOTES
1. Have you been to the ER, urgent care clinic or hospitalized since your last visit? YES.  2/3/18 [facility] ER for cyst removal and  [facility] ER 2/6/18 for wound packing    2. Have you seen or consulted any other health care providers outside of the [address] [place] [address] since your last visit (Include any pap smears or colon screening)? NO      Do you have an Advanced Directive? NO    Would you like information on Advanced Directives?  NO

## 2018-02-09 NOTE — MR AVS SNAPSHOT
[facility] 
 
 
 [address], [address] [place] [address] [facility] 
[phone] Patient: Thelma Feliz. MRN:  [id number] Visit Information Date & Time Provider Department Dept. Phone Encounter #  
 2/9/2018  1:30 PM Augustus Hernandez NP [facility] [address] [place] [address] [phone] [id number] Your Appointments 2/12/2018  3:00 PM  
ACUTE CARE with Apollo Nobles PA-C [facility] ([facility]) Appt Note: post HBV ED  
 [address], [place] [address] [address]  
[phone]  
  
   
 [address], [facility]  
  
    
 4/27/2018  7:00 AM  
LAB with [facility] NURSE [facility] ([facility]) Appt Note: 4 mos labs 129 [facility] [address] [address]  
[phone]  
  
   
 [address] 5/4/2018 11:00 AM  
Office Visit with Ayaka Valdes MD  
[facility] Appt Note: 4 mos fu per [facility] w/  
 [address], [place] [address] [address]  
[phone]  
  
   
 [email or web address] Upcoming Health Maintenance Date Due Pneumococcal 65+ High/Highest Risk (2 of 2 - PCV13) 10/10/2014 EYE EXAM RETINAL OR DILATED Q1 3/24/2016 FOOT EXAM Q1 6/15/2016 COLONOSCOPY 1/22/2017 GLAUCOMA SCREENING Q2Y 3/24/2017 MEDICARE YEARLY EXAM 2/25/2018 HEMOGLOBIN A1C Q6M 6/29/2018 MICROALBUMIN Q1 8/25/2018 LIPID PANEL Q1 12/29/2018 DTaP/Tdap/Td series (2 - Td) 4/28/2027 Allergies as of 2/9/2018  Review Complete On: 2/9/2018 By: Tez Woodruff LPN Severity Noted Reaction Type Reactions Chlorhexidine Towelette  11/04/2014    Itching Current Immunizations  Reviewed on 1/2/2018 Name Date Influenza High Dose Vaccine PF 1/2/2018 Influenza Vaccine 10/23/2014 Influenza Vaccine (Quad) PF 10/21/2016, 10/12/2015 Influenza Vaccine PF 10/10/2013 Pneumococcal Polysaccharide (PPSV-23) 10/10/2013 Tdap 4/28/2017 Not reviewed this visit Vitals BP Pulse Temp Resp Height(growth percentile) Weight(growth percentile) (!) 160/94 (BP 1 Location: Left arm, BP Patient Position: Sitting) 70 98.7 °F (37.1 °C) (Oral) 14 5' 10\" (1.778 m) 254 lb (115.2 kg) SpO2 BMI Smoking Status 97% 36.45 kg/m2 Former Smoker Vitals History BMI and BSA Data Body Mass Index Body Surface Area  
 36.45 kg/m 2 2.39 m 2 Preferred Pharmacy Pharmacy Name Phone [id number] Ron Taylor, [facility],4Th [facility] [phone] Your Updated Medication List  
  
   
This list is accurate as of: 2/9/18  2:19 PM.  Always use your most recent med list.  
  
  
  
  
 ALPRAZolam 0.25 mg tablet Commonly known as:  XANAX  
TAKE 1 TABLET BY MOUTH 3 TIMES DAILY AS NEEDED FOR ANXIETY. MAX DAILY AMOUNT IS 0.75MG (3 TABLETS)  
  
 amoxicillin-clavulanate 875-125 mg per tablet Commonly known as:  AUGMENTIN Take 1 Tab by mouth two (2) times a day for 10 days. aspirin 81 mg chewable tablet Take 1 Tab by mouth daily. cholecalciferol 1,000 unit tablet Commonly known as:  VITAMIN D3 Take 1,000 Units by mouth daily. clotrimazole-betamethasone topical cream  
Commonly known as:  Sobia Pilling Apply pea size affected area BID for 1 week  
  
 donepezil 23 mg film coated tablet Commonly known as:  ARICEPT  
  
 doxycycline 100 mg tablet Commonly known as:  VIBRA-TABS Take 100 mg by mouth two (2) times a day. escitalopram oxalate 10 mg tablet Commonly known as:  Colen Mealy TAKE 1 TABLET BY MOUTH ONCE DAILY  
  
 ketoconazole 2 % topical cream  
Commonly known as:  NIZORAL Apply  to affected area daily. levETIRAcetam 250 mg tablet Commonly known as:  KEPPRA Take 1 Tab by mouth two (2) times a day. memantine 10 mg tablet Commonly known as:  Glenny Mohr  
 TAKE 1 TABLET BY MOUTH TWICE DAILY  
  
 metoprolol succinate 50 mg XL tablet Commonly known as:  TOPROL-XL  
TAKE 1 TABLET BY MOUTH ONCE DAILY  
  
 multivitamin, tx-iron-ca-min 9 mg iron-400 mcg Tab tablet Commonly known as:  THERA-M w/ IRON Take 1 Tab by mouth daily. OTHER Compound Cream Gabapentin 6%, Baclofen 2%, Cyclobenzaprine 2%, Lidocaine 2%, Flurbiprofen 10% with Ketamine 10% Apply 1-2 grams (1-2 pumps) to affected area 3-4 times 120grams/2 refills OTHER Check CBC, CMP, Mg in 5 days, results to PCP immediately, Diagnosis- HTN  
  
 pantoprazole 40 mg tablet Commonly known as:  PROTONIX  
TAKE 1 TAB BY MOUTH DAILY. QUEtiapine 50 mg tablet Commonly known as:  SEROquel TAKE 1 TABLET BY MOUTH ONCE DAILY AT BEDTIME  
  
 rosuvastatin 5 mg tablet Commonly known as:  CRESTOR  
TAKE 1 TABLET BY MOUTH AT BEDTIME IF NEEDED  
  
 traMADol 50 mg tablet Commonly known as:  ULTRAM  
TAKE 2 TABLETS BY MOUTH EVERY 6 HOURS IF NEEDED(DO NOT TAKE MORE THAN 8 TABLETS A DAY)  
  
 valsartan 160 mg tablet Commonly known as:  DIOVAN Take 1 Tab by mouth daily. Introducing [address]! Carlos Nina introduces [employer] patient portal. Now you can access parts of your medical record, email your doctor's office, and request medication refills online. 1. In your internet browser, go to [email or web address] 2. Click on the First Time User? Click Here link in the Sign In box. You will see the New Member Sign Up page. 3. Enter your [employer] Access Code exactly as it appears below. You will not need to use this code after youve completed the sign-up process. If you do not sign up before the expiration date, you must request a new code. · [employer] Access Code: [address] Expires: 4/2/2018 10:27 AM 
 
4. Enter the last four digits of your Social Security Number (xxxx) and Date of Birth (mm/dd/yyyy) as indicated and click Submit.  You will be taken to the next sign-up page. 5. Create a "[employer] ID. This will be your "[employer] login ID and cannot be changed, so think of one that is secure and easy to remember. 6. Create a "[employer] password. You can change your password at any time. 7. Enter your Password Reset Question and Answer. This can be used at a later time if you forget your password. 8. Enter your e-mail address. You will receive e-mail notification when new information is available in [address] 9. Click Sign Up. You can now view and download portions of your medical record. 10. Click the Download Summary menu link to download a portable copy of your medical information. If you have questions, please visit the Frequently Asked Questions section of the "[employer] website. Remember, "[employer] is NOT to be used for urgent needs. For medical emergencies, dial 911. Now available from your iPhone and Android! Please provide this summary of care documentation to your next provider. Your primary care clinician is listed as PAOLA DERAS. If you have any questions after today's visit, please call [phone].

## 2018-02-12 ENCOUNTER — OFFICE VISIT (OUTPATIENT)
Dept: FAMILY MEDICINE CLINIC | Age: 78
End: 2018-02-12

## 2018-02-12 VITALS
DIASTOLIC BLOOD PRESSURE: 96 MMHG | WEIGHT: 253 LBS | HEART RATE: 73 BPM | TEMPERATURE: 99.3 F | OXYGEN SATURATION: 94 % | RESPIRATION RATE: 18 BRPM | SYSTOLIC BLOOD PRESSURE: 176 MMHG | BODY MASS INDEX: 36.22 KG/M2 | HEIGHT: 70 IN

## 2018-02-12 DIAGNOSIS — Z48.00 DRESSING CHANGE: ICD-10-CM

## 2018-02-12 DIAGNOSIS — E11.40 TYPE 2 DIABETES MELLITUS WITH DIABETIC NEUROPATHY, WITHOUT LONG-TERM CURRENT USE OF INSULIN (HCC): ICD-10-CM

## 2018-02-12 DIAGNOSIS — I10 HTN (HYPERTENSION), BENIGN: ICD-10-CM

## 2018-02-12 DIAGNOSIS — L02.91 ABSCESS: Primary | ICD-10-CM

## 2018-02-12 RX ORDER — AMLODIPINE BESYLATE 10 MG/1
10 TABLET ORAL DAILY
Qty: 30 TAB | Refills: 1 | Status: SHIPPED | OUTPATIENT
Start: 2018-02-12 | End: 2018-04-10 | Stop reason: SDUPTHER

## 2018-02-12 NOTE — PROGRESS NOTES
Irma Echeverria. is a  68 y.o. male presents today for office visit for post ED f/u Sebaceous cyst on left wrist.       1. Have you been to the ER, urgent care clinic or hospitalized since your last visit? YES HBV 3 FEB 2018, 6 FEB 2018      2. Have you seen or consulted any other health care providers outside of the [address] [place] [address] since your last visit (Include any pap smears or colon screening)?  NO

## 2018-02-12 NOTE — MR AVS SNAPSHOT
[facility] 
 
 
 [address], [facility] [address] [address] 
[phone] Patient: Cherri Roche. MRN:  [id number] Visit Information Date & Time Provider Department Dept. Phone Encounter #  
 2/12/2018  3:00 PM Tanesha Garcia PA-C [place] [address] [facility] [phone] [id number] Follow-up Instructions Return in about 1 week (around 2/19/2018), or if symptoms worsen or fail to improve. Your Appointments 4/27/2018  7:00 AM  
LAB with [facility] ([facility]) Appt Note: 4 mos labs 129 [facility] [address] [address]  
[phone]  
  
   
 [address] 5/4/2018 11:00 AM  
Office Visit with Viky Mireles MD  
[address] [place] [address] Appt Note: 4 mos fu per [facility] w/  
 [address], [place] [address]  
[phone]  
  
   
 [address] Upcoming Health Maintenance Date Due Pneumococcal 65+ High/Highest Risk (2 of 2 - PCV13) 10/10/2014 EYE EXAM RETINAL OR DILATED Q1 3/24/2016 FOOT EXAM Q1 6/15/2016 COLONOSCOPY 1/22/2017 GLAUCOMA SCREENING Q2Y 3/24/2017 MEDICARE YEARLY EXAM 2/25/2018 HEMOGLOBIN A1C Q6M 6/29/2018 MICROALBUMIN Q1 8/25/2018 LIPID PANEL Q1 12/29/2018 DTaP/Tdap/Td series (2 - Td) 4/28/2027 Allergies as of 2/12/2018  Review Complete On: 2/12/2018 By: Tanesha Garcia PA-C Severity Noted Reaction Type Reactions Chlorhexidine Towelette  11/04/2014    Itching Current Immunizations  Reviewed on 1/2/2018 Name Date Influenza High Dose Vaccine PF 1/2/2018 Influenza Vaccine 10/23/2014 Influenza Vaccine (Quad) PF 10/21/2016, 10/12/2015 Influenza Vaccine PF 10/10/2013 Pneumococcal Polysaccharide (PPSV-23) 10/10/2013 Tdap 4/28/2017 Not reviewed this visit You Were Diagnosed With   
  
 Codes Comments Abscess    -  Primary ICD-10-CM: L02.91 
ICD-9-CM: 682.9 HTN (hypertension), benign     ICD-10-CM: I10 
ICD-9-CM: 401.1 Type 2 diabetes mellitus with diabetic neuropathy, without long-term current use of insulin (HCC)     ICD-10-CM: E11.40 ICD-9-CM: 250.60, 357.2 Dressing change     ICD-10-CM: Z48.00 ICD-9-CM: V58.30 Vitals BP Pulse Temp Resp Height(growth percentile) Weight(growth percentile) (!) 176/96 (BP 1 Location: Right arm, BP Patient Position: Sitting) 73 99.3 °F (37.4 °C) (Oral) 18 5' 10\" (1.778 m) 253 lb (114.8 kg) SpO2 BMI Smoking Status 94% 36.3 kg/m2 Former Smoker Vitals History BMI and BSA Data Body Mass Index Body Surface Area  
 36.3 kg/m 2 2.38 m 2 Preferred Pharmacy Pharmacy Name Phone [id number] Zackary Jones, [address] [place] [address],[address] Floor [facility] [phone] Your Updated Medication List  
  
   
This list is accurate as of: 2/12/18  3:57 PM.  Always use your most recent med list.  
  
  
  
  
 ALPRAZolam 0.25 mg tablet Commonly known as:  XANAX  
TAKE 1 TABLET BY MOUTH 3 TIMES DAILY AS NEEDED FOR ANXIETY. MAX DAILY AMOUNT IS 0.75MG (3 TABLETS) amLODIPine 10 mg tablet Commonly known as:  Landy Hair Take 1 Tab by mouth daily. Indications: hypertension  
  
 amoxicillin-clavulanate 875-125 mg per tablet Commonly known as:  AUGMENTIN Take 1 Tab by mouth two (2) times a day for 10 days. aspirin 81 mg chewable tablet Take 1 Tab by mouth daily. cholecalciferol 1,000 unit tablet Commonly known as:  VITAMIN D3 Take 1,000 Units by mouth daily. donepezil 23 mg film coated tablet Commonly known as:  ARICEPT  
  
 doxycycline 100 mg tablet Commonly known as:  VIBRA-TABS Take 100 mg by mouth two (2) times a day. escitalopram oxalate 10 mg tablet Commonly known as:  Jordana Mor TAKE 1 TABLET BY MOUTH ONCE DAILY levETIRAcetam 250 mg tablet Commonly known as:  KEPPRA Take 1 Tab by mouth two (2) times a day. memantine 10 mg tablet Commonly known as:  Elias Tamar TAKE 1 TABLET BY MOUTH TWICE DAILY  
  
 metoprolol succinate 50 mg XL tablet Commonly known as:  TOPROL-XL  
TAKE 1 TABLET BY MOUTH ONCE DAILY  
  
 multivitamin, tx-iron-ca-min 9 mg iron-400 mcg Tab tablet Commonly known as:  THERA-M w/ IRON Take 1 Tab by mouth daily. OTHER Compound Cream Gabapentin 6%, Baclofen 2%, Cyclobenzaprine 2%, Lidocaine 2%, Flurbiprofen 10% with Ketamine 10% Apply 1-2 grams (1-2 pumps) to affected area 3-4 times 120grams/2 refills OTHER Check CBC, CMP, Mg in 5 days, results to PCP immediately, Diagnosis- HTN  
  
 pantoprazole 40 mg tablet Commonly known as:  PROTONIX  
TAKE 1 TAB BY MOUTH DAILY. QUEtiapine 50 mg tablet Commonly known as:  SEROquel TAKE 1 TABLET BY MOUTH ONCE DAILY AT BEDTIME  
  
 rosuvastatin 5 mg tablet Commonly known as:  CRESTOR  
TAKE 1 TABLET BY MOUTH AT BEDTIME IF NEEDED  
  
 traMADol 50 mg tablet Commonly known as:  ULTRAM  
TAKE 2 TABLETS BY MOUTH EVERY 6 HOURS IF NEEDED(DO NOT TAKE MORE THAN 8 TABLETS A DAY)  
  
 valsartan 160 mg tablet Commonly known as:  DIOVAN Take 1 Tab by mouth daily. Prescriptions Sent to Pharmacy Refills  
 amLODIPine (NORVASC) 10 mg tablet 1 Sig: Take 1 Tab by mouth daily. Indications: hypertension Class: Normal  
 Pharmacy: [facility]/pharmacy [address] [place] [address],[address] #: [phone] Route: Oral  
  
We Performed the Following [address] [place] [address] Comments:  
 [phone] Please evaluate and treat wound care as needed. Follow-up Instructions Return in about 1 week (around 2/19/2018), or if symptoms worsen or fail to improve. Referral Information Referral ID Referred By Referred To  
  
 [id number] Mack Mejia Not Available Visits Status Start Date End Date 1 New Request 2/12/18 2/12/19 If your referral has a status of pending review or denied, additional information will be sent to support the outcome of this decision. Introducing [facility]! Kelvin Farmer introduces [employer] patient portal. Now you can access parts of your medical record, email your doctor's office, and request medication refills online. 1. In your internet browser, go to [email or web address] 2. Click on the First Time User? Click Here link in the Sign In box. You will see the New Member Sign Up page. 3. Enter your [employer] Access Code exactly as it appears below. You will not need to use this code after youve completed the sign-up process. If you do not sign up before the expiration date, you must request a new code. · [employer] Access Code: [address] Expires: 4/2/2018 10:27 AM 
 
4. Enter the last four digits of your Social Security Number (xxxx) and Date of Birth (mm/dd/yyyy) as indicated and click Submit. You will be taken to the next sign-up page. 5. Create a [employer] ID. This will be your [employer] login ID and cannot be changed, so think of one that is secure and easy to remember. 6. Create a [employer] password. You can change your password at any time. 7. Enter your Password Reset Question and Answer. This can be used at a later time if you forget your password. 8. Enter your e-mail address. You will receive e-mail notification when new information is available in [address] 9. Click Sign Up. You can now view and download portions of your medical record. 10. Click the Download Summary menu link to download a portable copy of your medical information. If you have questions, please visit the Frequently Asked Questions section of the [employer] website. Remember, [employer] is NOT to be used for urgent needs. For medical emergencies, dial 911. Now available from your iPhone and Android! Please provide this summary of care documentation to your next provider. Your primary care clinician is listed as PAOLA DERAS. If you have any questions after today's visit, please call [phone].

## 2018-02-12 NOTE — PROGRESS NOTES
HPI:    Natalya Daily.  is a 68 y.o.  male  patient who comes in today for post ED visit from 2/3 and 2/6 for wound care. His wife is here and presents history for patient is poor historian due to dementia. It began as a little bump on his arm in 12/2017 and it had progressed to a large, tender, swollen and inflamed around on the dorsal surface of his lower left upper extremity. He went to [facility]  On 1/29 and they gave him antibiotic and didn't drain abscess. On 2/3 she took him to ED and had it drained and packed. Then she took him back on 2/6 and removed packing. She took him to see CINDY Fields on 2/10 and she gave increased his norvasc from 5 mg to 10 mg and Diovan to 320mg. However, wife states they don't have the norvasc and it wasn't in his list of medications. Home health wound care has not been sent to the home. Wife has been caring for the wound. Patient denies SOB, CP, dizziness, headache, fevers, chills. Current Outpatient Prescriptions   Medication Sig Dispense Refill    amLODIPine (NORVASC) 10 mg tablet Take 1 Tab by mouth daily. Indications: hypertension 30 Tab 1    doxycycline (VIBRA-TABS) 100 mg tablet Take 100 mg by mouth two (2) times a day.  amoxicillin-clavulanate (AUGMENTIN) 875-125 mg per tablet Take 1 Tab by mouth two (2) times a day for 10 days. 20 Tab 0    pantoprazole (PROTONIX) 40 mg tablet TAKE 1 TAB BY MOUTH DAILY. 30 Tab 2    ALPRAZolam (XANAX) 0.25 mg tablet TAKE 1 TABLET BY MOUTH 3 TIMES DAILY AS NEEDED FOR ANXIETY. MAX DAILY AMOUNT IS 0.75MG (3 TABLETS) 90 Tab 2    traMADol (ULTRAM) 50 mg tablet TAKE 2 TABLETS BY MOUTH EVERY 6 HOURS IF NEEDED(DO NOT TAKE MORE THAN 8 TABLETS A DAY) 60 Tab 3    rosuvastatin (CRESTOR) 5 mg tablet TAKE 1 TABLET BY MOUTH AT BEDTIME IF NEEDED 30 Tab 4    QUEtiapine (SEROQUEL) 50 mg tablet TAKE 1 TABLET BY MOUTH ONCE DAILY AT BEDTIME 30 Tab 3    valsartan (DIOVAN) 160 mg tablet Take 1 Tab by mouth daily.  30 Tab 5  metoprolol succinate (TOPROL-XL) 50 mg XL tablet TAKE 1 TABLET BY MOUTH ONCE DAILY 30 Tab 5    escitalopram oxalate (LEXAPRO) 10 mg tablet TAKE 1 TABLET BY MOUTH ONCE DAILY 30 Tab 5    memantine (NAMENDA) 10 mg tablet TAKE 1 TABLET BY MOUTH TWICE DAILY 60 Tab 3    levETIRAcetam (KEPPRA) 250 mg tablet Take 1 Tab by mouth two (2) times a day. 60 Tab 5    cholecalciferol (VITAMIN D3) 1,000 unit tablet Take 1,000 Units by mouth daily.  donepezil (ARICEPT) 23 mg film coated tablet   0    aspirin 81 mg chewable tablet Take 1 Tab by mouth daily. 30 Tab 0    OTHER Check CBC, CMP, Mg in 5 days, results to PCP immediately, Diagnosis- HTN 1 Each 0    multivitamin, tx-iron-ca-min (THERA-M W/ IRON) 9 mg iron-400 mcg tab tablet Take 1 Tab by mouth daily.  OTHER Compound Cream  Gabapentin 6%, Baclofen 2%, Cyclobenzaprine 2%, Lidocaine 2%, Flurbiprofen 10% with Ketamine 10%  Apply 1-2 grams (1-2 pumps) to affected area 3-4 times  120grams/2 refills        Allergies   Allergen Reactions    Chlorhexidine Towelette Itching      Past Medical History:   Diagnosis Date    Anxiety     Arthritis     Cancer ([facility] [address] 2009    bladder     Carotid duplex 05/26/2011    No occlusive disease >49% bilaterally.     Chronic back pain 5/6/2010    Chronic traumatic encephalopathy     Dementia     Depression     he has taken Xanax for 16 years for this    Diabetes ([facility] [address]     Dizziness     ED (erectile dysfunction)     Fibrosis of knee joint     Peripatellar, right    Headache(784.0)     HTN (hypertension), benign 4/1/2010    Late onset Alzheimer's disease with behavioral disturbance     Left wrist pain     Lumbar spinal stenosis 9/25/2010    Neuropathy of lower extremity     Obesity     Osteoarthritis of both knees     Sleep apnea     cpap use    SOB (shortness of breath)     Vertigo, benign positional       Family History   Problem Relation Age of Onset    Hypertension Mother     Heart Disease Mother    Kathi Cifuentes Other Mother      Alzheimer's disease    Diabetes Neg Hx       Patient Active Problem List   Diagnosis Code    Headache R51    Depression F32.9    ED (erectile dysfunction) N52.9    Vertigo, benign positional H81.10    Sleep apnea G47.30    HTN (hypertension), benign I10    Chronic back pain M54.9, G89.29    PN (peripheral neuropathy) G62.9    High triglycerides E78.1    Lumbar spinal stenosis M48.061    Anxiety F41.9    Fatigue R53.83    Memory loss R41.3    DM (diabetes mellitus) ([facility] [address] E11.9    Malignant neoplasm of bladder, part unspecified C67.9    Urinary system symptoms, other     Borderline high cholesterol E78.9    SOB (shortness of breath) R06.02    Dizziness R42    Dementia F03.90    Dyslipidemia E78.5    Osteoarthritis of right knee M17.11    Anemia D64.9    GI bleed due to NSAIDs K92.2, T39.395A    CTE (chronic traumatic encephalopathy) F07.81    Status post total right knee replacement Z96.651    Primary osteoarthritis of left knee M17.12    ACP (advance care planning) Z71.89    Late onset Alzheimer's disease with behavioral disturbance G30.1, F02.81    ACS (acute coronary syndrome) (HCC) I24.9    Troponin level elevated R74.8            ROS as pertinent in HPI    Visit Vitals    BP (!) 176/96 (BP 1 Location: Right arm, BP Patient Position: Sitting)    Pulse 73    Temp 99.3 °F (37.4 °C) (Oral)    Resp 18    Ht 5' 10\" (1.778 m)    Wt 253 lb (114.8 kg)    SpO2 94%    BMI 36.3 kg/m2        Physical Exam   Constitutional: He is oriented to person, place, and time and well-developed, well-nourished, and in no distress. HENT:   Head: Normocephalic and atraumatic. Neck: Normal range of motion. Neck supple. Cardiovascular: Normal rate, regular rhythm, normal heart sounds and intact distal pulses. Pulmonary/Chest: Effort normal and breath sounds normal.   Abdominal: Soft. Bowel sounds are normal.   Neurological: He is alert and oriented to person, place, and time. Skin: Skin is warm and dry. There is erythema. 5cm x 4cm abscess with opening, no drainage, streaking from wound, calor, dolor   Vitals reviewed. Health Maintenance Due   Topic Date Due    Pneumococcal 65+ High/Highest Risk (2 of 2 - PCV13) 10/10/2014    EYE EXAM RETINAL OR DILATED Q1  03/24/2016    FOOT EXAM Q1  06/15/2016    COLONOSCOPY  01/22/2017    GLAUCOMA SCREENING Q2Y  03/24/2017       Assessment/Plan:    Diagnoses and all orders for this visit:    1. Abscess  -     [place] has been ordered as urgent. Dressing changed by Gerald Chávez    2. HTN (hypertension), benign  -     amLODIPine (NORVASC) 10 mg tablet; Take 1 Tab by mouth daily. Indications: hypertension  -     [facility]    3. Type 2 diabetes mellitus with diabetic neuropathy, without long-term current use of insulin ([facility] [address]       Follow-up Disposition:  Return in about 1 week (around 2/19/2018), or if symptoms worsen or fail to improve. Additional Notes: Discussed today's diagnosis, treatment plans. Discussed medication indications and side effects. Preventive Medicine:   Weight Management:  Mediterranean diet recommended as well as exercise for 30 minutes daily most days of the week. DASH diet info given. After Visit Summary: Provided and discussed printed patient instructions. Answered all questions and patient acknowledged understanding. Mr. Asa Burch has a reminder for a \"due or due soon\" health maintenance. I have asked that he contact his primary care provider for follow-up on this health maintenance.         Joana Wang PA-C

## 2018-02-13 ENCOUNTER — HOME HEALTH ADMISSION (OUTPATIENT)
Dept: HOME HEALTH SERVICES | Facility: HOME HEALTH | Age: 78
End: 2018-02-13
Payer: MEDICARE

## 2018-02-16 ENCOUNTER — HOME CARE VISIT (OUTPATIENT)
Dept: HOME HEALTH SERVICES | Facility: HOME HEALTH | Age: 78
End: 2018-02-16

## 2018-02-17 ENCOUNTER — HOME CARE VISIT (OUTPATIENT)
Dept: SCHEDULING | Facility: HOME HEALTH | Age: 78
End: 2018-02-17
Payer: MEDICARE

## 2018-02-17 PROCEDURE — G0299 HHS/HOSPICE OF RN EA 15 MIN: HCPCS

## 2018-02-17 PROCEDURE — 3331090001 HH PPS REVENUE CREDIT

## 2018-02-17 PROCEDURE — 3331090002 HH PPS REVENUE DEBIT

## 2018-02-17 PROCEDURE — 400013 HH SOC

## 2018-02-18 PROCEDURE — 3331090002 HH PPS REVENUE DEBIT

## 2018-02-18 PROCEDURE — 3331090001 HH PPS REVENUE CREDIT

## 2018-02-19 ENCOUNTER — HOME CARE VISIT (OUTPATIENT)
Dept: HOME HEALTH SERVICES | Facility: HOME HEALTH | Age: 78
End: 2018-02-19
Payer: MEDICARE

## 2018-02-19 PROCEDURE — 3331090002 HH PPS REVENUE DEBIT

## 2018-02-19 PROCEDURE — 3331090001 HH PPS REVENUE CREDIT

## 2018-02-20 ENCOUNTER — OFFICE VISIT (OUTPATIENT)
Dept: FAMILY MEDICINE CLINIC | Age: 78
End: 2018-02-20

## 2018-02-20 ENCOUNTER — TELEPHONE (OUTPATIENT)
Dept: FAMILY MEDICINE CLINIC | Age: 78
End: 2018-02-20

## 2018-02-20 VITALS
DIASTOLIC BLOOD PRESSURE: 79 MMHG | WEIGHT: 255.2 LBS | HEART RATE: 72 BPM | RESPIRATION RATE: 18 BRPM | BODY MASS INDEX: 36.54 KG/M2 | OXYGEN SATURATION: 95 % | TEMPERATURE: 97.9 F | SYSTOLIC BLOOD PRESSURE: 153 MMHG | HEIGHT: 70 IN

## 2018-02-20 DIAGNOSIS — G30.1 LATE ONSET ALZHEIMER'S DISEASE WITH BEHAVIORAL DISTURBANCE (HCC): ICD-10-CM

## 2018-02-20 DIAGNOSIS — F02.818 LATE ONSET ALZHEIMER'S DISEASE WITH BEHAVIORAL DISTURBANCE (HCC): ICD-10-CM

## 2018-02-20 DIAGNOSIS — Z48.00 CHANGE OR REMOVAL OF WOUND DRESSING: ICD-10-CM

## 2018-02-20 DIAGNOSIS — E11.40 TYPE 2 DIABETES MELLITUS WITH DIABETIC NEUROPATHY, WITHOUT LONG-TERM CURRENT USE OF INSULIN (HCC): ICD-10-CM

## 2018-02-20 DIAGNOSIS — L02.91 ABSCESS: Primary | ICD-10-CM

## 2018-02-20 PROCEDURE — 3331090001 HH PPS REVENUE CREDIT

## 2018-02-20 PROCEDURE — 3331090002 HH PPS REVENUE DEBIT

## 2018-02-20 NOTE — PROGRESS NOTES
HPI:    Ben Bumpers.  is a 68 y.o.  male  patient who comes in today for follow up for abscess on left upper extremity. Patient voices no complaints and states that wound is healing. Wound care is going to the house twice weekly. Wife is with patient and gives history, patient has dementia. Blood sugars at home around 110 mg/dL. Patient denies SOB, CP, dizziness, headache, numbness. Current Outpatient Prescriptions   Medication Sig Dispense Refill    amLODIPine (NORVASC) 10 mg tablet Take 1 Tab by mouth daily. Indications: hypertension 30 Tab 1    pantoprazole (PROTONIX) 40 mg tablet TAKE 1 TAB BY MOUTH DAILY. 30 Tab 2    ALPRAZolam (XANAX) 0.25 mg tablet TAKE 1 TABLET BY MOUTH 3 TIMES DAILY AS NEEDED FOR ANXIETY. MAX DAILY AMOUNT IS 0.75MG (3 TABLETS) 90 Tab 2    rosuvastatin (CRESTOR) 5 mg tablet TAKE 1 TABLET BY MOUTH AT BEDTIME IF NEEDED 30 Tab 4    QUEtiapine (SEROQUEL) 50 mg tablet TAKE 1 TABLET BY MOUTH ONCE DAILY AT BEDTIME 30 Tab 3    valsartan (DIOVAN) 160 mg tablet Take 1 Tab by mouth daily. 30 Tab 5    metoprolol succinate (TOPROL-XL) 50 mg XL tablet TAKE 1 TABLET BY MOUTH ONCE DAILY 30 Tab 5    escitalopram oxalate (LEXAPRO) 10 mg tablet TAKE 1 TABLET BY MOUTH ONCE DAILY 30 Tab 5    memantine (NAMENDA) 10 mg tablet TAKE 1 TABLET BY MOUTH TWICE DAILY 60 Tab 3    levETIRAcetam (KEPPRA) 250 mg tablet Take 1 Tab by mouth two (2) times a day. 60 Tab 5    cholecalciferol (VITAMIN D3) 1,000 unit tablet Take 1,000 Units by mouth daily.  donepezil (ARICEPT) 23 mg film coated tablet   0    aspirin 81 mg chewable tablet Take 1 Tab by mouth daily. 30 Tab 0    OTHER Check CBC, CMP, Mg in 5 days, results to PCP immediately, Diagnosis- HTN 1 Each 0    multivitamin, tx-iron-ca-min (THERA-M W/ IRON) 9 mg iron-400 mcg tab tablet Take 1 Tab by mouth daily.       OTHER Compound Cream  Gabapentin 6%, Baclofen 2%, Cyclobenzaprine 2%, Lidocaine 2%, Flurbiprofen 10% with Ketamine 10%  Apply 1-2 grams (1-2 pumps) to affected area 3-4 times  120grams/2 refills      traMADol (ULTRAM) 50 mg tablet TAKE 2 TABLETS BY MOUTH EVERY 6 HOURS IF NEEDED(DO NOT TAKE MORE THAN 8 TABLETS A DAY) 60 Tab 3      Allergies   Allergen Reactions    Chlorhexidine Towelette Itching      Past Medical History:   Diagnosis Date    Anxiety     Arthritis     Cancer ([facility] [address] 2009    bladder     Carotid duplex 05/26/2011    No occlusive disease >49% bilaterally.     Chronic back pain 5/6/2010    Chronic traumatic encephalopathy     Dementia     Depression     he has taken Xanax for 16 years for this    Diabetes ([facility] [address]     Dizziness     ED (erectile dysfunction)     Fibrosis of knee joint     Peripatellar, right    Headache(784.0)     HTN (hypertension), benign 4/1/2010    Late onset Alzheimer's disease with behavioral disturbance     Left wrist pain     Lumbar spinal stenosis 9/25/2010    Neuropathy of lower extremity     Obesity     Osteoarthritis of both knees     Sleep apnea     cpap use    SOB (shortness of breath)     Vertigo, benign positional       Family History   Problem Relation Age of Onset    Hypertension Mother     Heart Disease Mother     Other Mother      Alzheimer's disease    Diabetes Neg Hx       Patient Active Problem List   Diagnosis Code    Headache R51    Depression F32.9    ED (erectile dysfunction) N52.9    Vertigo, benign positional H81.10    Sleep apnea G47.30    HTN (hypertension), benign I10    Chronic back pain M54.9, G89.29    PN (peripheral neuropathy) G62.9    High triglycerides E78.1    Lumbar spinal stenosis M48.061    Anxiety F41.9    Fatigue R53.83    Memory loss R41.3    DM (diabetes mellitus) ([facility] [address] E11.9    Malignant neoplasm of bladder, part unspecified C67.9    Urinary system symptoms, other     Borderline high cholesterol E78.9    SOB (shortness of breath) R06.02    Dizziness R42    Dementia F03.90    Dyslipidemia E78.5    Osteoarthritis of right knee M17.11    Anemia D64.9    GI bleed due to NSAIDs K92.2, T39.395A    CTE (chronic traumatic encephalopathy) F07.81    Status post total right knee replacement Z96.651    Primary osteoarthritis of left knee M17.12    ACP (advance care planning) Z71.89    Late onset Alzheimer's disease with behavioral disturbance G30.1, F02.81    ACS (acute coronary syndrome) (HCC) I24.9    Troponin level elevated R74.8            Review of Systems   Constitutional: Negative for chills and fever. Skin: Negative for itching and rash. No draining, streaking, calor, dolor at site. Psychiatric/Behavioral: Positive for memory loss. Negative for depression. Visit Vitals    /79 (BP 1 Location: Left arm, BP Patient Position: Sitting)    Pulse 72    Temp 97.9 °F (36.6 °C) (Oral)    Resp 18    Ht 5' 10\" (1.778 m)    Wt 255 lb 3.2 oz (115.8 kg)    SpO2 95%    BMI 36.62 kg/m2        Physical Exam   Constitutional: He is well-developed, well-nourished, and in no distress. HENT:   Head: Normocephalic and atraumatic. Neck: Normal range of motion. Neck supple. Cardiovascular: Normal rate, regular rhythm and normal heart sounds. Pulmonary/Chest: Effort normal and breath sounds normal.   Neurological: He is alert. Skin: Skin is warm and dry. No erythema. Psychiatric: He exhibits normal recent memory and normal remote memory. Vitals reviewed. Assessment/Plan:    Diagnoses and all orders for this visit:    1. Abscess - healing well, referral to Dr. Shantel Lozano. Patient requesting surgical removal.    2. Late onset Alzheimer's disease with behavioral disturbance    3. Type 2 diabetes mellitus with diabetic neuropathy, without long-term current use of insulin ([facility])    4. Change or removal of wound dressing     Follow-up Disposition:  Return if symptoms worsen or fail to improve, for routine visit with PCP. Additional Notes: Discussed today's diagnosis, treatment plans. Discussed medication indications and side effects. After Visit Summary: Provided and discussed printed patient instructions. Answered all questions and patient acknowledged understanding. Mr. Bianca Ruiz has a reminder for a \"due or due soon\" health maintenance. I have asked that he contact his primary care provider for follow-up on this health maintenance. Erika Martinez PA-C     I reviewed the patient's medical history, the physician assistant's findings on physical examination, the patient's diagnoses, and treatment plan as documented in the progress note. I concur with the treatment plan as documented. There are no additional recommendations at this time.     Latrice Melendez MD

## 2018-02-20 NOTE — PROGRESS NOTES
Jonathan Negar. is a  68 y.o. male presents today for office visit for routine follow up. 1. Have you been to the ER, urgent care clinic or hospitalized since your last visit? NO    2. Have you seen or consulted any other health care providers outside of the [address] [place] [address] since your last visit (Include any pap smears or colon screening)?    NO

## 2018-02-20 NOTE — PATIENT INSTRUCTIONS
Learning About High Blood Pressure  What is high blood pressure? Blood pressure is a measure of how hard the blood pushes against the walls of your arteries. It's normal for blood pressure to go up and down throughout the day, but if it stays up, you have high blood pressure. Another name for high blood pressure is hypertension. Two numbers tell you your blood pressure. The first number is the systolic pressure. It shows how hard the blood pushes when your heart is pumping. The second number is the diastolic pressure. It shows how hard the blood pushes between heartbeats, when your heart is relaxed and filling with blood. A blood pressure of less than 120/80 (say \"120 over 80\") is ideal for an adult. High blood pressure is 140/90 or higher. You have high blood pressure if your top number is 140 or higher or your bottom number is 90 or higher, or both. Many people fall into the category in between, called prehypertension. People with prehypertension need to make lifestyle changes to bring their blood pressure down and help prevent or delay high blood pressure. What happens when you have high blood pressure? · Blood flows through your arteries with too much force. Over time, this damages the walls of your arteries. But you can't feel it. High blood pressure usually doesn't cause symptoms. · Fat and calcium start to build up in your arteries. This buildup is called plaque. Plaque makes your arteries narrower and stiffer. Blood can't flow through them as easily. · This lack of good blood flow starts to damage some of the organs in your body. This can lead to problems such as coronary artery disease and heart attack, heart failure, stroke, kidney failure, and eye damage. How can you prevent high blood pressure? · Stay at a healthy weight. · Try to limit how much sodium you eat to less than 2,300 milligrams (mg) a day.  If you limit your sodium to 1,500 mg a day, you can lower your blood pressure even more.  ¨ Buy foods that are labeled \"unsalted,\" \"sodium-free,\" or \"low-sodium. \" Foods labeled \"reduced-sodium\" and \"light sodium\" may still have too much sodium. ¨ Flavor your food with garlic, lemon juice, onion, vinegar, herbs, and spices instead of salt. Do not use soy sauce, steak sauce, onion salt, garlic salt, mustard, or ketchup on your food. ¨ Use less salt (or none) when recipes call for it. You can often use half the salt a recipe calls for without losing flavor. · Be physically active. Get at least 30 minutes of exercise on most days of the week. Walking is a good choice. You also may want to do other activities, such as running, swimming, cycling, or playing tennis or team sports. · Limit alcohol to 2 drinks a day for men and 1 drink a day for women. · Eat plenty of fruits, vegetables, and low-fat dairy products. Eat less saturated and total fats. How is high blood pressure treated? · Your doctor will suggest making lifestyle changes. For example, your doctor may ask you to eat healthy foods, quit smoking, lose extra weight, and be more active. · If lifestyle changes don't help enough or your blood pressure is very high, you will have to take medicine every day. Follow-up care is a key part of your treatment and safety. Be sure to make and go to all appointments, and call your doctor if you are having problems. It's also a good idea to know your test results and keep a list of the medicines you take. Where can you learn more? Go to http://christie-brigid.info/. Enter P501 in the search box to learn more about \"Learning About High Blood Pressure. \"  Current as of: September 21, 2016  Content Version: 11.4  © 4936-5705 [employer]. Care instructions adapted under license by [employer] (which disclaims liability or warranty for this information).  If you have questions about a medical condition or this instruction, always ask your healthcare professional. [facility], [facility] disclaims any warranty or liability for your use of this information. Learning About High Blood Pressure  What is high blood pressure? Blood pressure is a measure of how hard the blood pushes against the walls of your arteries. It's normal for blood pressure to go up and down throughout the day, but if it stays up, you have high blood pressure. Another name for high blood pressure is hypertension. Two numbers tell you your blood pressure. The first number is the systolic pressure. It shows how hard the blood pushes when your heart is pumping. The second number is the diastolic pressure. It shows how hard the blood pushes between heartbeats, when your heart is relaxed and filling with blood. A blood pressure of less than 120/80 (say \"120 over 80\") is ideal for an adult. High blood pressure is 140/90 or higher. You have high blood pressure if your top number is 140 or higher or your bottom number is 90 or higher, or both. Many people fall into the category in between, called prehypertension. People with prehypertension need to make lifestyle changes to bring their blood pressure down and help prevent or delay high blood pressure. What happens when you have high blood pressure? · Blood flows through your arteries with too much force. Over time, this damages the walls of your arteries. But you can't feel it. High blood pressure usually doesn't cause symptoms. · Fat and calcium start to build up in your arteries. This buildup is called plaque. Plaque makes your arteries narrower and stiffer. Blood can't flow through them as easily. · This lack of good blood flow starts to damage some of the organs in your body. This can lead to problems such as coronary artery disease and heart attack, heart failure, stroke, kidney failure, and eye damage. How can you prevent high blood pressure? · Stay at a healthy weight.   · Try to limit how much sodium you eat to less than 2,300 milligrams (mg) a day. If you limit your sodium to 1,500 mg a day, you can lower your blood pressure even more. ¨ Buy foods that are labeled \"unsalted,\" \"sodium-free,\" or \"low-sodium. \" Foods labeled \"reduced-sodium\" and \"light sodium\" may still have too much sodium. ¨ Flavor your food with garlic, lemon juice, onion, vinegar, herbs, and spices instead of salt. Do not use soy sauce, steak sauce, onion salt, garlic salt, mustard, or ketchup on your food. ¨ Use less salt (or none) when recipes call for it. You can often use half the salt a recipe calls for without losing flavor. · Be physically active. Get at least 30 minutes of exercise on most days of the week. Walking is a good choice. You also may want to do other activities, such as running, swimming, cycling, or playing tennis or team sports. · Limit alcohol to 2 drinks a day for men and 1 drink a day for women. · Eat plenty of fruits, vegetables, and low-fat dairy products. Eat less saturated and total fats. How is high blood pressure treated? · Your doctor will suggest making lifestyle changes. For example, your doctor may ask you to eat healthy foods, quit smoking, lose extra weight, and be more active. · If lifestyle changes don't help enough or your blood pressure is very high, you will have to take medicine every day. Follow-up care is a key part of your treatment and safety. Be sure to make and go to all appointments, and call your doctor if you are having problems. It's also a good idea to know your test results and keep a list of the medicines you take. Where can you learn more? Go to http://christie-brigid.info/. Enter P501 in the search box to learn more about \"Learning About High Blood Pressure. \"  Current as of: September 21, 2016  Content Version: 11.4  © 5637-7887 [employer]. Care instructions adapted under license by [employer] (which disclaims liability or warranty for this information).  If you have questions about a medical condition or this instruction, always ask your healthcare professional. [address] any warranty or liability for your use of this information. Learning About High Blood Pressure  What is high blood pressure? Blood pressure is a measure of how hard the blood pushes against the walls of your arteries. It's normal for blood pressure to go up and down throughout the day, but if it stays up, you have high blood pressure. Another name for high blood pressure is hypertension. Two numbers tell you your blood pressure. The first number is the systolic pressure. It shows how hard the blood pushes when your heart is pumping. The second number is the diastolic pressure. It shows how hard the blood pushes between heartbeats, when your heart is relaxed and filling with blood. A blood pressure of less than 120/80 (say \"120 over 80\") is ideal for an adult. High blood pressure is 140/90 or higher. You have high blood pressure if your top number is 140 or higher or your bottom number is 90 or higher, or both. Many people fall into the category in between, called prehypertension. People with prehypertension need to make lifestyle changes to bring their blood pressure down and help prevent or delay high blood pressure. What happens when you have high blood pressure? · Blood flows through your arteries with too much force. Over time, this damages the walls of your arteries. But you can't feel it. High blood pressure usually doesn't cause symptoms. · Fat and calcium start to build up in your arteries. This buildup is called plaque. Plaque makes your arteries narrower and stiffer. Blood can't flow through them as easily. · This lack of good blood flow starts to damage some of the organs in your body. This can lead to problems such as coronary artery disease and heart attack, heart failure, stroke, kidney failure, and eye damage.   How can you prevent high blood pressure? · Stay at a healthy weight. · Try to limit how much sodium you eat to less than 2,300 milligrams (mg) a day. If you limit your sodium to 1,500 mg a day, you can lower your blood pressure even more. ¨ Buy foods that are labeled \"unsalted,\" \"sodium-free,\" or \"low-sodium. \" Foods labeled \"reduced-sodium\" and \"light sodium\" may still have too much sodium. ¨ Flavor your food with garlic, lemon juice, onion, vinegar, herbs, and spices instead of salt. Do not use soy sauce, steak sauce, onion salt, garlic salt, mustard, or ketchup on your food. ¨ Use less salt (or none) when recipes call for it. You can often use half the salt a recipe calls for without losing flavor. · Be physically active. Get at least 30 minutes of exercise on most days of the week. Walking is a good choice. You also may want to do other activities, such as running, swimming, cycling, or playing tennis or team sports. · Limit alcohol to 2 drinks a day for men and 1 drink a day for women. · Eat plenty of fruits, vegetables, and low-fat dairy products. Eat less saturated and total fats. How is high blood pressure treated? · Your doctor will suggest making lifestyle changes. For example, your doctor may ask you to eat healthy foods, quit smoking, lose extra weight, and be more active. · If lifestyle changes don't help enough or your blood pressure is very high, you will have to take medicine every day. Follow-up care is a key part of your treatment and safety. Be sure to make and go to all appointments, and call your doctor if you are having problems. It's also a good idea to know your test results and keep a list of the medicines you take. Where can you learn more? Go to http://christie-brigid.info/. Enter P501 in the search box to learn more about \"Learning About High Blood Pressure. \"  Current as of: September 21, 2016  Content Version: 11.4  © 6639-1841 [employer].  Care instructions adapted under license by [address] (which disclaims liability or warranty for this information). If you have questions about a medical condition or this instruction, always ask your healthcare professional. [address] any warranty or liability for your use of this information. Wound Check: Care Instructions  Your Care Instructions  People have wounds that need care for many reasons. You may have a cut that needs care after surgery. You may have a cut or puncture wound from an accident. Or you may have a wound because of a condition like diabetes. Whatever the cause of your wound, there are things you can do to care for it at home. Your doctor may also want you to come back for a wound check. The wound check lets the doctor know how your wound is healing and if you need more treatment. Follow-up care is a key part of your treatment and safety. Be sure to make and go to all appointments, and call your doctor if you are having problems. It's also a good idea to know your test results and keep a list of the medicines you take. How can you care for yourself at home? · If your doctor told you how to care for your wound, follow your doctor's instructions. If you did not get instructions, follow this general advice:  ¨ You may cover the wound with a thin layer of petroleum jelly, such as Vaseline, and a nonstick bandage. ¨ Apply more petroleum jelly and replace the bandage as needed. · Keep the wound dry for the first 24 to 48 hours. After this, you can shower if your doctor okays it. Pat the wound dry. · Be safe with medicines. Read and follow all instructions on the label. ¨ If the doctor gave you a prescription medicine for pain, take it as prescribed. ¨ If you are not taking a prescription pain medicine, ask your doctor if you can take an over-the-counter medicine. · If your doctor prescribed antibiotics, take them as directed. Do not stop taking them just because you feel better. You need to take the full course of antibiotics. · If you have stitches, do not remove them on your own. Your doctor will tell you when to come back to have them removed. · If you have Steri-Strips, leave them on until they fall off. · If possible, prop up the injured area on a pillow anytime you sit or lie down during the next 3 days. Try to keep it above the level of your heart. This will help reduce swelling. When should you call for help? Call your doctor now or seek immediate medical care if:  ? · You have new pain, or the pain gets worse. ? · The skin near the wound is cold or pale or changes color. ? · You have tingling, weakness, or numbness near the wound. ? · The wound starts to bleed, and blood soaks through the bandage. Oozing small amounts of blood is normal.   ? · You have symptoms of infection, such as:  ¨ Increased pain, swelling, warmth, or redness. ¨ Red streaks leading from the wound. ¨ Pus draining from the wound. ¨ A fever. ? Watch closely for changes in your health, and be sure to contact your doctor if:  ? · You do not get better as expected. Where can you learn more? Go to http://christie-brigid.info/. Enter P342 in the search box to learn more about \"Wound Check: Care Instructions. \"  Current as of: March 20, 2017  Content Version: 11.4  © 3060-4591 [employer]. Care instructions adapted under license by [employer] (which disclaims liability or warranty for this information). If you have questions about a medical condition or this instruction, always ask your healthcare professional. [address] any warranty or liability for your use of this information.

## 2018-02-20 NOTE — TELEPHONE ENCOUNTER
Tried to call patient unable to leave a voice message, mailbox was not set up yet. Left message for Ms. Sav to call Dr. Edwin Yao office to schedule an appointment for patient.

## 2018-02-20 NOTE — MR AVS SNAPSHOT
[facility] 
 
 
 [address], [place] [address] [address] [address] 
[phone] Patient: Belia Rosales. MRN:  [id number] Visit Information Date & Time Provider Department Dept. Phone Encounter #  
 2/20/2018  8:30 AM Capri Hdz [facility]-C [facility] [phone] [id number] Follow-up Instructions Return if symptoms worsen or fail to improve, for routine visit with PCP. Your Appointments 4/27/2018  7:00 AM  
LAB with [facility] (DOM [facility]) Appt Note: 4 mos labs 129 [facility] [address] [address]  
[phone]  
  
   
 [address] 5/4/2018 11:00 AM  
Office Visit with Maria Fernanda Rai MD  
[address] [facility] [facility]) Appt Note: 4 mos fu per [address] [facility],2Nd Floor w/  
 [address], [facility] [address] [address]  
[phone]  
  
   
 [address] Upcoming Health Maintenance Date Due Pneumococcal 65+ High/Highest Risk (2 of 2 - PCV13) 10/10/2014 EYE EXAM RETINAL OR DILATED Q1 3/24/2016 FOOT EXAM Q1 6/15/2016 COLONOSCOPY 1/22/2017 GLAUCOMA SCREENING Q2Y 3/24/2017 MEDICARE YEARLY EXAM 2/25/2018 HEMOGLOBIN A1C Q6M 6/29/2018 MICROALBUMIN Q1 8/25/2018 LIPID PANEL Q1 12/29/2018 DTaP/Tdap/Td series (2 - Td) 4/28/2027 Allergies as of 2/20/2018  Review Complete On: 2/20/2018 By: Capri Hdz PA-C Severity Noted Reaction Type Reactions Chlorhexidine Towelette  11/04/2014    Itching Current Immunizations  Reviewed on 1/2/2018 Name Date Influenza High Dose Vaccine PF 1/2/2018 Influenza Vaccine 10/23/2014 Influenza Vaccine (Quad) PF 10/21/2016, 10/12/2015 Influenza Vaccine PF 10/10/2013 Pneumococcal Polysaccharide (PPSV-23) 10/10/2013 Tdap 4/28/2017 Not reviewed this visit You Were Diagnosed With   
  
 Codes Comments Abscess    -  Primary ICD-10-CM: L02.91 
ICD-9-CM: 682.9 Late onset Alzheimer's disease with behavioral disturbance     ICD-10-CM: G30.1, F02.81 ICD-9-CM: 331.0, 294.11 Type 2 diabetes mellitus with diabetic neuropathy, without long-term current use of insulin (HCC)     ICD-10-CM: E11.40 ICD-9-CM: 250.60, 357.2 Change or removal of wound dressing     ICD-10-CM: Z48.00 ICD-9-CM: V58.30 Vitals BP Pulse Temp Resp Height(growth percentile) Weight(growth percentile) 153/79 (BP 1 Location: Left arm, BP Patient Position: Sitting) 72 97.9 °F (36.6 °C) (Oral) 18 5' 10\" (1.778 m) 255 lb 3.2 oz (115.8 kg) SpO2 BMI Smoking Status 95% 36.62 kg/m2 Former Smoker BMI and BSA Data Body Mass Index Body Surface Area  
 36.62 kg/m 2 2.39 m 2 Preferred Pharmacy Pharmacy Name Phone [id number] [address] [place] [phone] Your Updated Medication List  
  
   
This list is accurate as of: 2/20/18  9:07 AM.  Always use your most recent med list.  
  
  
  
  
 ALPRAZolam 0.25 mg tablet Commonly known as:  XANAX  
TAKE 1 TABLET BY MOUTH 3 TIMES DAILY AS NEEDED FOR ANXIETY. MAX DAILY AMOUNT IS 0.75MG (3 TABLETS) amLODIPine 10 mg tablet Commonly known as:  Renata Valdivia Take 1 Tab by mouth daily. Indications: hypertension  
  
 aspirin 81 mg chewable tablet Take 1 Tab by mouth daily. cholecalciferol 1,000 unit tablet Commonly known as:  VITAMIN D3 Take 1,000 Units by mouth daily. donepezil 23 mg film coated tablet Commonly known as:  ARICEPT  
  
 escitalopram oxalate 10 mg tablet Commonly known as:  Daniel Duarte TAKE 1 TABLET BY MOUTH ONCE DAILY  
  
 levETIRAcetam 250 mg tablet Commonly known as:  KEPPRA Take 1 Tab by mouth two (2) times a day. memantine 10 mg tablet Commonly known as:  Fred Laureano TAKE 1 TABLET BY MOUTH TWICE DAILY  
  
 metoprolol succinate 50 mg XL tablet Commonly known as:  TOPROL-XL  
TAKE 1 TABLET BY MOUTH ONCE DAILY  
  
 multivitamin, tx-iron-ca-min 9 mg iron-400 mcg Tab tablet Commonly known as:  THERA-M w/ IRON Take 1 Tab by mouth daily. OTHER Compound Cream Gabapentin 6%, Baclofen 2%, Cyclobenzaprine 2%, Lidocaine 2%, Flurbiprofen 10% with Ketamine 10% Apply 1-2 grams (1-2 pumps) to affected area 3-4 times 120grams/2 refills OTHER Check CBC, CMP, Mg in 5 days, results to PCP immediately, Diagnosis- HTN  
  
 pantoprazole 40 mg tablet Commonly known as:  PROTONIX  
TAKE 1 TAB BY MOUTH DAILY. QUEtiapine 50 mg tablet Commonly known as:  SEROquel TAKE 1 TABLET BY MOUTH ONCE DAILY AT BEDTIME  
  
 rosuvastatin 5 mg tablet Commonly known as:  CRESTOR  
TAKE 1 TABLET BY MOUTH AT BEDTIME IF NEEDED  
  
 traMADol 50 mg tablet Commonly known as:  ULTRAM  
TAKE 2 TABLETS BY MOUTH EVERY 6 HOURS IF NEEDED(DO NOT TAKE MORE THAN 8 TABLETS A DAY)  
  
 valsartan 160 mg tablet Commonly known as:  DIOVAN Take 1 Tab by mouth daily. Follow-up Instructions Return if symptoms worsen or fail to improve, for routine visit with PCP. To-Do List   
 02/23/2018 To Be Determined Appointment with Cortes Gallegos LPN at [address] [facility]  
  
 02/27/2018 To Be Determined Appointment with Cortes Gallegos LPN at [address] [facility]  
  
 03/02/2018 To Be Determined Appointment with Cortes Gallegos LPN at [address] [facility] Patient Instructions Learning About High Blood Pressure What is high blood pressure? Blood pressure is a measure of how hard the blood pushes against the walls of your arteries. It's normal for blood pressure to go up and down throughout the day, but if it stays up, you have high blood pressure. Another name for high blood pressure is hypertension. Two numbers tell you your blood pressure. The first number is the systolic pressure. It shows how hard the blood pushes when your heart is pumping. The second number is the diastolic pressure. It shows how hard the blood pushes between heartbeats, when your heart is relaxed and filling with blood. A blood pressure of less than 120/80 (say \"120 over 80\") is ideal for an adult. High blood pressure is 140/90 or higher. You have high blood pressure if your top number is 140 or higher or your bottom number is 90 or higher, or both. Many people fall into the category in between, called prehypertension. People with prehypertension need to make lifestyle changes to bring their blood pressure down and help prevent or delay high blood pressure. What happens when you have high blood pressure? · Blood flows through your arteries with too much force. Over time, this damages the walls of your arteries. But you can't feel it. High blood pressure usually doesn't cause symptoms. · Fat and calcium start to build up in your arteries. This buildup is called plaque. Plaque makes your arteries narrower and stiffer. Blood can't flow through them as easily. · This lack of good blood flow starts to damage some of the organs in your body. This can lead to problems such as coronary artery disease and heart attack, heart failure, stroke, kidney failure, and eye damage. How can you prevent high blood pressure? · Stay at a healthy weight. · Try to limit how much sodium you eat to less than 2,300 milligrams (mg) a day. If you limit your sodium to 1,500 mg a day, you can lower your blood pressure even more. ¨ Buy foods that are labeled \"unsalted,\" \"sodium-free,\" or \"low-sodium. \" Foods labeled \"reduced-sodium\" and \"light sodium\" may still have too much sodium. ¨ Flavor your food with garlic, lemon juice, onion, vinegar, herbs, and spices instead of salt.  Do not use soy sauce, steak sauce, onion salt, garlic salt, mustard, or ketchup on your food. ¨ Use less salt (or none) when recipes call for it. You can often use half the salt a recipe calls for without losing flavor. · Be physically active. Get at least 30 minutes of exercise on most days of the week. Walking is a good choice. You also may want to do other activities, such as running, swimming, cycling, or playing tennis or team sports. · Limit alcohol to 2 drinks a day for men and 1 drink a day for women. · Eat plenty of fruits, vegetables, and low-fat dairy products. Eat less saturated and total fats. How is high blood pressure treated? · Your doctor will suggest making lifestyle changes. For example, your doctor may ask you to eat healthy foods, quit smoking, lose extra weight, and be more active. · If lifestyle changes don't help enough or your blood pressure is very high, you will have to take medicine every day. Follow-up care is a key part of your treatment and safety. Be sure to make and go to all appointments, and call your doctor if you are having problems. It's also a good idea to know your test results and keep a list of the medicines you take. Where can you learn more? Go to http://christie-brigid.info/. Enter P501 in the search box to learn more about \"Learning About High Blood Pressure. \" Current as of: September 21, 2016 Content Version: 11.4 © 8909-7015 [employer]. Care instructions adapted under license by [employer] (which disclaims liability or warranty for this information). If you have questions about a medical condition or this instruction, always ask your healthcare professional. [place] [address] any warranty or liability for your use of this information. Learning About High Blood Pressure What is high blood pressure?  
 
Blood pressure is a measure of how hard the blood pushes against the walls of your arteries. It's normal for blood pressure to go up and down throughout the day, but if it stays up, you have high blood pressure. Another name for high blood pressure is hypertension. Two numbers tell you your blood pressure. The first number is the systolic pressure. It shows how hard the blood pushes when your heart is pumping. The second number is the diastolic pressure. It shows how hard the blood pushes between heartbeats, when your heart is relaxed and filling with blood. A blood pressure of less than 120/80 (say \"120 over 80\") is ideal for an adult. High blood pressure is 140/90 or higher. You have high blood pressure if your top number is 140 or higher or your bottom number is 90 or higher, or both. Many people fall into the category in between, called prehypertension. People with prehypertension need to make lifestyle changes to bring their blood pressure down and help prevent or delay high blood pressure. What happens when you have high blood pressure? · Blood flows through your arteries with too much force. Over time, this damages the walls of your arteries. But you can't feel it. High blood pressure usually doesn't cause symptoms. · Fat and calcium start to build up in your arteries. This buildup is called plaque. Plaque makes your arteries narrower and stiffer. Blood can't flow through them as easily. · This lack of good blood flow starts to damage some of the organs in your body. This can lead to problems such as coronary artery disease and heart attack, heart failure, stroke, kidney failure, and eye damage. How can you prevent high blood pressure? · Stay at a healthy weight. · Try to limit how much sodium you eat to less than 2,300 milligrams (mg) a day. If you limit your sodium to 1,500 mg a day, you can lower your blood pressure even more. ¨ Buy foods that are labeled \"unsalted,\" \"sodium-free,\" or \"low-sodium. \" Foods labeled \"reduced-sodium\" and \"light sodium\" may still have too much sodium. ¨ Flavor your food with garlic, lemon juice, onion, vinegar, herbs, and spices instead of salt. Do not use soy sauce, steak sauce, onion salt, garlic salt, mustard, or ketchup on your food. ¨ Use less salt (or none) when recipes call for it. You can often use half the salt a recipe calls for without losing flavor. · Be physically active. Get at least 30 minutes of exercise on most days of the week. Walking is a good choice. You also may want to do other activities, such as running, swimming, cycling, or playing tennis or team sports. · Limit alcohol to 2 drinks a day for men and 1 drink a day for women. · Eat plenty of fruits, vegetables, and low-fat dairy products. Eat less saturated and total fats. How is high blood pressure treated? · Your doctor will suggest making lifestyle changes. For example, your doctor may ask you to eat healthy foods, quit smoking, lose extra weight, and be more active. · If lifestyle changes don't help enough or your blood pressure is very high, you will have to take medicine every day. Follow-up care is a key part of your treatment and safety. Be sure to make and go to all appointments, and call your doctor if you are having problems. It's also a good idea to know your test results and keep a list of the medicines you take. Where can you learn more? Go to http://christie-brigid.info/. Enter P501 in the search box to learn more about \"Learning About High Blood Pressure. \" Current as of: September 21, 2016 Content Version: 11.4 © 6135-4343 [employer] Care instructions adapted under license by [employer] (which disclaims liability or warranty for this information).  If you have questions about a medical condition or this instruction, always ask your healthcare professional. [address] any warranty or liability for your use of this information. Learning About High Blood Pressure What is high blood pressure? Blood pressure is a measure of how hard the blood pushes against the walls of your arteries. It's normal for blood pressure to go up and down throughout the day, but if it stays up, you have high blood pressure. Another name for high blood pressure is hypertension. Two numbers tell you your blood pressure. The first number is the systolic pressure. It shows how hard the blood pushes when your heart is pumping. The second number is the diastolic pressure. It shows how hard the blood pushes between heartbeats, when your heart is relaxed and filling with blood. A blood pressure of less than 120/80 (say \"120 over 80\") is ideal for an adult. High blood pressure is 140/90 or higher. You have high blood pressure if your top number is 140 or higher or your bottom number is 90 or higher, or both. Many people fall into the category in between, called prehypertension. People with prehypertension need to make lifestyle changes to bring their blood pressure down and help prevent or delay high blood pressure. What happens when you have high blood pressure? · Blood flows through your arteries with too much force. Over time, this damages the walls of your arteries. But you can't feel it. High blood pressure usually doesn't cause symptoms. · Fat and calcium start to build up in your arteries. This buildup is called plaque. Plaque makes your arteries narrower and stiffer. Blood can't flow through them as easily. · This lack of good blood flow starts to damage some of the organs in your body. This can lead to problems such as coronary artery disease and heart attack, heart failure, stroke, kidney failure, and eye damage. How can you prevent high blood pressure? · Stay at a healthy weight. · Try to limit how much sodium you eat to less than 2,300 milligrams (mg) a day.  If you limit your sodium to 1,500 mg a day, you can lower your blood pressure even more. ¨ Buy foods that are labeled \"unsalted,\" \"sodium-free,\" or \"low-sodium. \" Foods labeled \"reduced-sodium\" and \"light sodium\" may still have too much sodium. ¨ Flavor your food with garlic, lemon juice, onion, vinegar, herbs, and spices instead of salt. Do not use soy sauce, steak sauce, onion salt, garlic salt, mustard, or ketchup on your food. ¨ Use less salt (or none) when recipes call for it. You can often use half the salt a recipe calls for without losing flavor. · Be physically active. Get at least 30 minutes of exercise on most days of the week. Walking is a good choice. You also may want to do other activities, such as running, swimming, cycling, or playing tennis or team sports. · Limit alcohol to 2 drinks a day for men and 1 drink a day for women. · Eat plenty of fruits, vegetables, and low-fat dairy products. Eat less saturated and total fats. How is high blood pressure treated? · Your doctor will suggest making lifestyle changes. For example, your doctor may ask you to eat healthy foods, quit smoking, lose extra weight, and be more active. · If lifestyle changes don't help enough or your blood pressure is very high, you will have to take medicine every day. Follow-up care is a key part of your treatment and safety. Be sure to make and go to all appointments, and call your doctor if you are having problems. It's also a good idea to know your test results and keep a list of the medicines you take. Where can you learn more? Go to http://christie-brigid.info/. Enter P501 in the search box to learn more about \"Learning About High Blood Pressure. \" Current as of: September 21, 2016 Content Version: 11.4 © 4329-4342 [employer]. Care instructions adapted under license by [email or web address] (which disclaims liability or warranty for this information).  If you have questions about a medical condition or this instruction, always ask your healthcare professional. [address] any warranty or liability for your use of this information. Wound Check: Care Instructions Your Care Instructions People have wounds that need care for many reasons. You may have a cut that needs care after surgery. You may have a cut or puncture wound from an accident. Or you may have a wound because of a condition like diabetes. Whatever the cause of your wound, there are things you can do to care for it at home. Your doctor may also want you to come back for a wound check. The wound check lets the doctor know how your wound is healing and if you need more treatment. Follow-up care is a key part of your treatment and safety. Be sure to make and go to all appointments, and call your doctor if you are having problems. It's also a good idea to know your test results and keep a list of the medicines you take. How can you care for yourself at home? · If your doctor told you how to care for your wound, follow your doctor's instructions. If you did not get instructions, follow this general advice: ¨ You may cover the wound with a thin layer of petroleum jelly, such as Vaseline, and a nonstick bandage. ¨ Apply more petroleum jelly and replace the bandage as needed. · Keep the wound dry for the first 24 to 48 hours. After this, you can shower if your doctor okays it. Pat the wound dry. · Be safe with medicines. Read and follow all instructions on the label. ¨ If the doctor gave you a prescription medicine for pain, take it as prescribed. ¨ If you are not taking a prescription pain medicine, ask your doctor if you can take an over-the-counter medicine. · If your doctor prescribed antibiotics, take them as directed. Do not stop taking them just because you feel better. You need to take the full course of antibiotics. · If you have stitches, do not remove them on your own.  Your doctor will tell you when to come back to have them removed. · If you have Steri-Strips, leave them on until they fall off. · If possible, prop up the injured area on a pillow anytime you sit or lie down during the next 3 days. Try to keep it above the level of your heart. This will help reduce swelling. When should you call for help? Call your doctor now or seek immediate medical care if: 
? · You have new pain, or the pain gets worse. ? · The skin near the wound is cold or pale or changes color. ? · You have tingling, weakness, or numbness near the wound. ? · The wound starts to bleed, and blood soaks through the bandage. Oozing small amounts of blood is normal.  
? · You have symptoms of infection, such as: 
¨ Increased pain, swelling, warmth, or redness. ¨ Red streaks leading from the wound. ¨ Pus draining from the wound. ¨ A fever. ? Watch closely for changes in your health, and be sure to contact your doctor if: 
? · You do not get better as expected. Where can you learn more? Go to http://christie-brigid.info/. Enter P342 in the search box to learn more about \"Wound Check: Care Instructions. \" Current as of: March 20, 2017 Content Version: 11.4 © 7204-9311 [employer]. Care instructions adapted under license by [employer] (which disclaims liability or warranty for this information). If you have questions about a medical condition or this instruction, always ask your healthcare professional. [place] [address] any warranty or liability for your use of this information. Introducing \[facility] [place] [facility]\"" & HEALTH SERVICES! [employer] introduces [employer] patient portal. Now you can access parts of your medical record, email your doctor's office, and request medication refills online. 1. In your internet browser, go to [email or web address] 2. Click on the First Time User? Click Here link in the Sign In box.  You will see the New Member Sign Up page. 3. Enter your [employer] Access Code exactly as it appears below. You will not need to use this code after youve completed the sign-up process. If you do not sign up before the expiration date, you must request a new code. · [employer] Access Code: [email or web address] HARISH Santos Expires: 4/2/2018 10:27 AM 
 
4. Enter the last four digits of your Social Security Number (xxxx) and Date of Birth (mm/dd/yyyy) as indicated and click Submit. You will be taken to the next sign-up page. 5. Create a [employer] ID. This will be your [employer] login ID and cannot be changed, so think of one that is secure and easy to remember. 6. Create a [employer] password. You can change your password at any time. 7. Enter your Password Reset Question and Answer. This can be used at a later time if you forget your password. 8. Enter your e-mail address. You will receive e-mail notification when new information is available in [address]. 9. Click Sign Up. You can now view and download portions of your medical record. 10. Click the Download Summary menu link to download a portable copy of your medical information. If you have questions, please visit the Frequently Asked Questions section of the [employer] website. Remember, [employer] is NOT to be used for urgent needs. For medical emergencies, dial 911. Now available from your iPhone and Android! Please provide this summary of care documentation to your next provider. Your primary care clinician is listed as PAOLA DERAS. If you have any questions after today's visit, please call [phone].

## 2018-02-21 PROCEDURE — 3331090002 HH PPS REVENUE DEBIT

## 2018-02-21 PROCEDURE — 3331090001 HH PPS REVENUE CREDIT

## 2018-02-22 PROCEDURE — 3331090002 HH PPS REVENUE DEBIT

## 2018-02-22 PROCEDURE — 3331090001 HH PPS REVENUE CREDIT

## 2018-02-23 ENCOUNTER — HOME CARE VISIT (OUTPATIENT)
Dept: SCHEDULING | Facility: HOME HEALTH | Age: 78
End: 2018-02-23
Payer: MEDICARE

## 2018-02-23 VITALS
SYSTOLIC BLOOD PRESSURE: 130 MMHG | OXYGEN SATURATION: 96 % | RESPIRATION RATE: 18 BRPM | HEART RATE: 63 BPM | DIASTOLIC BLOOD PRESSURE: 80 MMHG | TEMPERATURE: 96.8 F

## 2018-02-23 PROCEDURE — 3331090001 HH PPS REVENUE CREDIT

## 2018-02-23 PROCEDURE — G0300 HHS/HOSPICE OF LPN EA 15 MIN: HCPCS

## 2018-02-23 PROCEDURE — 3331090002 HH PPS REVENUE DEBIT

## 2018-02-24 PROCEDURE — 3331090002 HH PPS REVENUE DEBIT

## 2018-02-24 PROCEDURE — 3331090001 HH PPS REVENUE CREDIT

## 2018-02-25 PROCEDURE — 3331090002 HH PPS REVENUE DEBIT

## 2018-02-25 PROCEDURE — 3331090001 HH PPS REVENUE CREDIT

## 2018-02-26 ENCOUNTER — OFFICE VISIT (OUTPATIENT)
Dept: SURGERY | Age: 78
End: 2018-02-26

## 2018-02-26 VITALS
TEMPERATURE: 97.9 F | OXYGEN SATURATION: 95 % | DIASTOLIC BLOOD PRESSURE: 70 MMHG | SYSTOLIC BLOOD PRESSURE: 135 MMHG | HEART RATE: 70 BPM | RESPIRATION RATE: 18 BRPM | WEIGHT: 257 LBS | HEIGHT: 70 IN | BODY MASS INDEX: 36.79 KG/M2

## 2018-02-26 DIAGNOSIS — L72.3 SEBACEOUS CYST: Primary | ICD-10-CM

## 2018-02-26 PROCEDURE — 3331090001 HH PPS REVENUE CREDIT

## 2018-02-26 PROCEDURE — 3331090002 HH PPS REVENUE DEBIT

## 2018-02-26 NOTE — MR AVS SNAPSHOT
[address] [place] [address] [facility] 
[phone] Patient: Erickson Dahl. MRN:  [id number] Visit Information Date & Time Provider Department Dept. Phone Encounter #  
 2/26/2018 11:00 AM [id number] Your Appointments 4/9/2018 11:00 AM  
[facility] ([address]) Appt Note: 6 week f/up; 6 week f/up [address] [place] [address] [facility] [address] [facility]  
  
    
 4/27/2018  7:00 AM  
LAB with [facility] (DOM [facility]) Appt Note: 4 mos labs 129 [facility] [address] [address]  
[phone]  
  
   
 [address] 5/4/2018 11:00 AM  
Office Visit with Alesia Mcneal MD  
[address] [place], [place] [address] [facility]) Appt Note: 4 mos fu per [facility] [place] w/  
 [address], [place] [address] [address]  
[phone]  
  
   
 [address] Upcoming Health Maintenance Date Due Pneumococcal 65+ High/Highest Risk (2 of 2 - PCV13) 10/10/2014 EYE EXAM RETINAL OR DILATED Q1 3/24/2016 FOOT EXAM Q1 6/15/2016 COLONOSCOPY 1/22/2017 GLAUCOMA SCREENING Q2Y 3/24/2017 MEDICARE YEARLY EXAM 2/25/2018 HEMOGLOBIN A1C Q6M 6/29/2018 MICROALBUMIN Q1 8/25/2018 LIPID PANEL Q1 12/29/2018 DTaP/Tdap/Td series (2 - Td) 4/28/2027 Allergies as of 2/26/2018  Review Complete On: 2/26/2018 By: Donna Jaimes LPN Severity Noted Reaction Type Reactions Chlorhexidine Towelette  11/04/2014    Itching Current Immunizations  Reviewed on 1/2/2018 Name Date Influenza High Dose Vaccine PF 1/2/2018 Influenza Vaccine 10/23/2014 Influenza Vaccine (Quad) PF 10/21/2016, 10/12/2015 Influenza Vaccine PF 10/10/2013 Pneumococcal Polysaccharide (PPSV-23) 10/10/2013 Tdap 4/28/2017 Not reviewed this visit Vitals BP Pulse Temp Resp Height(growth percentile) Weight(growth percentile) 135/70 70 97.9 °F (36.6 °C) (Oral) 18 5' 10\" (1.778 m) 257 lb (116.6 kg) SpO2 BMI Smoking Status 95% 36.88 kg/m2 Former Smoker Vitals History BMI and BSA Data Body Mass Index Body Surface Area  
 36.88 kg/m 2 2.4 m 2 Preferred Pharmacy Pharmacy Name Phone [facility]/PHARMACY #[id number], [facility] [phone] Your Updated Medication List  
  
   
This list is accurate as of 2/26/18 11:05 AM.  Always use your most recent med list.  
  
  
  
  
 ALPRAZolam 0.25 mg tablet Commonly known as:  XANAX  
TAKE 1 TABLET BY MOUTH 3 TIMES DAILY AS NEEDED FOR ANXIETY. MAX DAILY AMOUNT IS 0.75MG (3 TABLETS) amLODIPine 10 mg tablet Commonly known as:  Donita Prow Take 1 Tab by mouth daily. Indications: hypertension  
  
 aspirin 81 mg chewable tablet Take 1 Tab by mouth daily. cholecalciferol 1,000 unit tablet Commonly known as:  VITAMIN D3 Take 1,000 Units by mouth daily. donepezil 23 mg film coated tablet Commonly known as:  ARICEPT  
  
 escitalopram oxalate 10 mg tablet Commonly known as:  Sid Rito TAKE 1 TABLET BY MOUTH ONCE DAILY  
  
 levETIRAcetam 250 mg tablet Commonly known as:  KEPPRA Take 1 Tab by mouth two (2) times a day. LOTRISONE topical cream  
Generic drug:  clotrimazole-betamethasone Apply 2 g to affected area two (2) times a day. Indications: appply to affected skin bid and prn, apply pea size amount  
  
 memantine 10 mg tablet Commonly known as:  Jackelin Jewel TAKE 1 TABLET BY MOUTH TWICE DAILY  
  
 metoprolol succinate 50 mg XL tablet Commonly known as:  TOPROL-XL  
TAKE 1 TABLET BY MOUTH ONCE DAILY multivitamin, tx-iron-ca-min 9 mg iron-400 mcg Tab tablet Commonly known as:  THERA-M w/ IRON Take 1 Tab by mouth daily. OTHER Compound Cream Gabapentin 6%, Baclofen 2%, Cyclobenzaprine 2%, Lidocaine 2%, Flurbiprofen 10% with Ketamine 10% Apply 1-2 grams (1-2 pumps) to affected area 3-4 times 120grams/2 refills OTHER Check CBC, CMP, Mg in 5 days, results to PCP immediately, Diagnosis- HTN  
  
 pantoprazole 40 mg tablet Commonly known as:  PROTONIX  
TAKE 1 TAB BY MOUTH DAILY. QUEtiapine 50 mg tablet Commonly known as:  SEROquel TAKE 1 TABLET BY MOUTH ONCE DAILY AT BEDTIME  
  
 rosuvastatin 5 mg tablet Commonly known as:  CRESTOR  
TAKE 1 TABLET BY MOUTH AT BEDTIME IF NEEDED  
  
 traMADol 50 mg tablet Commonly known as:  ULTRAM  
TAKE 2 TABLETS BY MOUTH EVERY 6 HOURS IF NEEDED(DO NOT TAKE MORE THAN 8 TABLETS A DAY)  
  
 valsartan 160 mg tablet Commonly known as:  DIOVAN Take 1 Tab by mouth daily. To-Do List   
 02/27/2018 To Be Determined Appointment with Julien Horne LPN at [address] [facility]  
  
 03/02/2018 To Be Determined Appointment with Julien Horne LPN at [facility]! Kylie Jauregui introduces [employer] patient portal. Now you can access parts of your medical record, email your doctor's office, and request medication refills online. 1. In your internet browser, go to [email or web address] 2. Click on the First Time User? Click Here link in the Sign In box. You will see the New Member Sign Up page. 3. Enter your [employer] Access Code exactly as it appears below. You will not need to use this code after youve completed the sign-up process. If you do not sign up before the expiration date, you must request a new code. · [employer] Access Code: [email or web address] Expires: 4/2/2018 10:27 AM 
 
 4. Enter the last four digits of your Social Security Number (xxxx) and Date of Birth (mm/dd/yyyy) as indicated and click Submit. You will be taken to the next sign-up page. 5. Create a internetstores ID. This will be your internetstores login ID and cannot be changed, so think of one that is secure and easy to remember. 6. Create a internetstores password. You can change your password at any time. 7. Enter your Password Reset Question and Answer. This can be used at a later time if you forget your password. 8. Enter your e-mail address. You will receive e-mail notification when new information is available in [address] 9. Click Sign Up. You can now view and download portions of your medical record. 10. Click the Download Summary menu link to download a portable copy of your medical information. If you have questions, please visit the Frequently Asked Questions section of the internetstores website. Remember, [employer] is NOT to be used for urgent needs. For medical emergencies, dial 911. Now available from your iPhone and Android! Please provide this summary of care documentation to your next provider. Your primary care clinician is listed as PAOLA DERAS. If you have any questions after today's visit, please call [phone].

## 2018-02-27 ENCOUNTER — HOME CARE VISIT (OUTPATIENT)
Dept: SCHEDULING | Facility: HOME HEALTH | Age: 78
End: 2018-02-27
Payer: MEDICARE

## 2018-02-27 VITALS
DIASTOLIC BLOOD PRESSURE: 78 MMHG | OXYGEN SATURATION: 98 % | SYSTOLIC BLOOD PRESSURE: 130 MMHG | HEART RATE: 67 BPM | RESPIRATION RATE: 20 BRPM | TEMPERATURE: 98 F

## 2018-02-27 PROBLEM — L72.3 SEBACEOUS CYST: Status: ACTIVE | Noted: 2018-02-27

## 2018-02-27 PROCEDURE — 3331090001 HH PPS REVENUE CREDIT

## 2018-02-27 PROCEDURE — 3331090002 HH PPS REVENUE DEBIT

## 2018-02-27 PROCEDURE — G0300 HHS/HOSPICE OF LPN EA 15 MIN: HCPCS

## 2018-02-27 NOTE — PROGRESS NOTES
Progress Note    Patient: Dalton Ash MRN: [id number]  SSN: [id number]   YOB: 1940  Age: 68 y.o. Sex: male     Chief Complaint   Patient presents with    Cyst     left arm       HPI    Mr. Bianca Ruiz is a 60-year-old gentleman who was seen in the emergency room for an infected subcutaneous cyst or nodule several weeks ago. An incision and drainage was performed and has now healed up nicely. We discussed what to do about this but at this point in time I would not move to do anything about it as it could possibly heal up and be done forever. We have discussed this and he will come back and see me in 6 weeks for reevaluation. Past Medical History:   Diagnosis Date    Anxiety     Arthritis     Cancer ([address] 2009    bladder     Carotid duplex 05/26/2011    No occlusive disease >49% bilaterally.  Chronic back pain 5/6/2010    Chronic traumatic encephalopathy     Dementia     Depression     he has taken Xanax for 16 years for this    Diabetes ([address]     Dizziness     ED (erectile dysfunction)     Fibrosis of knee joint     Peripatellar, right    Headache(784.0)     HTN (hypertension), benign 4/1/2010    Late onset Alzheimer's disease with behavioral disturbance     Left wrist pain     Lumbar spinal stenosis 9/25/2010    Neuropathy of lower extremity     Obesity     Osteoarthritis of both knees     Sleep apnea     cpap use    SOB (shortness of breath)     Vertigo, benign positional      Past Surgical History:   Procedure Laterality Date    BIOPSY  12/19/2007    HX OTHER SURGICAL  11/2014    right knee    HX UROLOGICAL      Bladder CA     Allergies   Allergen Reactions    Chlorhexidine Towelette Itching     Current Outpatient Prescriptions   Medication Sig Dispense Refill    clotrimazole-betamethasone (LOTRISONE) topical cream Apply 2 g to affected area two (2) times a day.  Indications: appply to affected skin bid and prn, apply pea size amount      amLODIPine (NORVASC) 10 mg tablet Take 1 Tab by mouth daily. Indications: hypertension 30 Tab 1    pantoprazole (PROTONIX) 40 mg tablet TAKE 1 TAB BY MOUTH DAILY. 30 Tab 2    ALPRAZolam (XANAX) 0.25 mg tablet TAKE 1 TABLET BY MOUTH 3 TIMES DAILY AS NEEDED FOR ANXIETY. MAX DAILY AMOUNT IS 0.75MG (3 TABLETS) 90 Tab 2    traMADol (ULTRAM) 50 mg tablet TAKE 2 TABLETS BY MOUTH EVERY 6 HOURS IF NEEDED(DO NOT TAKE MORE THAN 8 TABLETS A DAY) 60 Tab 3    rosuvastatin (CRESTOR) 5 mg tablet TAKE 1 TABLET BY MOUTH AT BEDTIME IF NEEDED 30 Tab 4    QUEtiapine (SEROQUEL) 50 mg tablet TAKE 1 TABLET BY MOUTH ONCE DAILY AT BEDTIME 30 Tab 3    valsartan (DIOVAN) 160 mg tablet Take 1 Tab by mouth daily. 30 Tab 5    metoprolol succinate (TOPROL-XL) 50 mg XL tablet TAKE 1 TABLET BY MOUTH ONCE DAILY 30 Tab 5    escitalopram oxalate (LEXAPRO) 10 mg tablet TAKE 1 TABLET BY MOUTH ONCE DAILY 30 Tab 5    memantine (NAMENDA) 10 mg tablet TAKE 1 TABLET BY MOUTH TWICE DAILY 60 Tab 3    levETIRAcetam (KEPPRA) 250 mg tablet Take 1 Tab by mouth two (2) times a day. 60 Tab 5    cholecalciferol (VITAMIN D3) 1,000 unit tablet Take 1,000 Units by mouth daily.  donepezil (ARICEPT) 23 mg film coated tablet   0    aspirin 81 mg chewable tablet Take 1 Tab by mouth daily. 30 Tab 0    OTHER Check CBC, CMP, Mg in 5 days, results to PCP immediately, Diagnosis- HTN 1 Each 0    multivitamin, tx-iron-ca-min (THERA-M W/ IRON) 9 mg iron-400 mcg tab tablet Take 1 Tab by mouth daily.  OTHER Compound Cream  Gabapentin 6%, Baclofen 2%, Cyclobenzaprine 2%, Lidocaine 2%, Flurbiprofen 10% with Ketamine 10%  Apply 1-2 grams (1-2 pumps) to affected area 3-4 times  120grams/2 refills       Social History     Social History    Marital status:      Spouse name: N/A    Number of children: N/A    Years of education: N/A     Occupational History    Not on file.      Social History Main Topics    Smoking status: Former Smoker    Smokeless tobacco: Never Used Comment: quit smoking in 2000    Alcohol use No    Drug use: No    Sexual activity: Not Currently     Other Topics Concern    Not on file     Social History Narrative     Family History   Problem Relation Age of Onset    Hypertension Mother     Heart Disease Mother     Other Mother      Alzheimer's disease    Diabetes Neg Hx          Review of systems:  Patient denies any reflux, emesis, abdominal pain, change in bowel habits, hematochezia, melena, fever, weight loss, fatigue chills, dermatitis, abnormal moles, change in vision, vertigo, epistaxis, dysphagia, hoarseness, chest pain, palpitations, hypertension, edema, cough, shortness of breath, wheezing, hemoptysis, snoring, hematuria, diabetes, thyroid disease, anemia, bruising, history of blood transfusion, dizziness, headache, or fainting. Physical Examination    Well developed well nourished male in no apparent distress  Visit Vitals    /70    Pulse 70    Temp 97.9 °F (36.6 °C) (Oral)    Resp 18    Ht 5' 10\" (1.778 m)    Wt 116.6 kg (257 lb)    SpO2 95%    BMI 36.88 kg/m2      Head: normocephalic, atraumatic  Mouth: Clear, no overt lesions, oral mucosa pink and moist  Neck: supple, no masses, no adenopathy or carotid bruits, trachea midline  Resp: clear to auscultation bilaterally, no wheeze, rhonchi or rales, excursions normal and symmetrical  Cardio: Regular rate and rhythm, no murmurs, clicks, gallops or rubs, no edema or varicosities  Abdomen: soft, nontender, nondistended, normoactive bowel sounds, no hernias, no hepatosplenomegaly,   Back: Deferred  Extremeties: warm, well-perfused, no tenderness or swelling, normal gait/station left forearm incision and drainage looks good without cellulitis or drainage.   Neuro: sensation and strength grossly intact and symmetrical  Psych: alert and oriented to person, place and time  Breast exam deferred    IMPRESSION  Status post I&D of left forearm abscess/cyst    PLAN  No orders of the defined types were placed in this encounter.     Follow-up in 6 weeks  Hubert Moss MD

## 2018-02-28 PROCEDURE — 3331090001 HH PPS REVENUE CREDIT

## 2018-02-28 PROCEDURE — 3331090002 HH PPS REVENUE DEBIT

## 2018-03-01 PROCEDURE — 3331090002 HH PPS REVENUE DEBIT

## 2018-03-01 PROCEDURE — 3331090001 HH PPS REVENUE CREDIT

## 2018-03-02 PROCEDURE — 3331090001 HH PPS REVENUE CREDIT

## 2018-03-02 PROCEDURE — 3331090002 HH PPS REVENUE DEBIT

## 2018-03-03 ENCOUNTER — HOME CARE VISIT (OUTPATIENT)
Dept: SCHEDULING | Facility: HOME HEALTH | Age: 78
End: 2018-03-03
Payer: MEDICARE

## 2018-03-03 PROCEDURE — 3331090001 HH PPS REVENUE CREDIT

## 2018-03-03 PROCEDURE — 3331090002 HH PPS REVENUE DEBIT

## 2018-03-03 PROCEDURE — G0299 HHS/HOSPICE OF RN EA 15 MIN: HCPCS

## 2018-03-03 PROCEDURE — 3331090003 HH PPS REVENUE ADJ

## 2018-03-03 RX ORDER — VALSARTAN 160 MG/1
TABLET ORAL
Qty: 30 TAB | Refills: 5 | Status: SHIPPED | OUTPATIENT
Start: 2018-03-03 | End: 2018-07-30

## 2018-03-04 PROCEDURE — 3331090002 HH PPS REVENUE DEBIT

## 2018-03-04 PROCEDURE — 3331090001 HH PPS REVENUE CREDIT

## 2018-03-05 VITALS
TEMPERATURE: 98.1 F | SYSTOLIC BLOOD PRESSURE: 120 MMHG | DIASTOLIC BLOOD PRESSURE: 78 MMHG | HEART RATE: 67 BPM | OXYGEN SATURATION: 99 % | RESPIRATION RATE: 19 BRPM

## 2018-03-05 PROCEDURE — 3331090002 HH PPS REVENUE DEBIT

## 2018-03-05 PROCEDURE — 3331090001 HH PPS REVENUE CREDIT

## 2018-03-06 PROCEDURE — 3331090002 HH PPS REVENUE DEBIT

## 2018-03-06 PROCEDURE — 3331090001 HH PPS REVENUE CREDIT

## 2018-03-07 VITALS
HEART RATE: 74 BPM | DIASTOLIC BLOOD PRESSURE: 74 MMHG | SYSTOLIC BLOOD PRESSURE: 118 MMHG | TEMPERATURE: 98.3 F | RESPIRATION RATE: 18 BRPM | OXYGEN SATURATION: 95 %

## 2018-03-07 PROCEDURE — 3331090002 HH PPS REVENUE DEBIT

## 2018-03-07 PROCEDURE — 3331090001 HH PPS REVENUE CREDIT

## 2018-03-08 PROCEDURE — 3331090002 HH PPS REVENUE DEBIT

## 2018-03-08 PROCEDURE — 3331090001 HH PPS REVENUE CREDIT

## 2018-03-09 PROCEDURE — 3331090001 HH PPS REVENUE CREDIT

## 2018-03-09 PROCEDURE — 3331090002 HH PPS REVENUE DEBIT

## 2018-03-10 PROCEDURE — 3331090001 HH PPS REVENUE CREDIT

## 2018-03-10 PROCEDURE — 3331090002 HH PPS REVENUE DEBIT

## 2018-03-11 PROCEDURE — 3331090002 HH PPS REVENUE DEBIT

## 2018-03-11 PROCEDURE — 3331090001 HH PPS REVENUE CREDIT

## 2018-03-12 PROCEDURE — 3331090002 HH PPS REVENUE DEBIT

## 2018-03-12 PROCEDURE — 3331090001 HH PPS REVENUE CREDIT

## 2018-03-12 RX ORDER — ESCITALOPRAM OXALATE 10 MG/1
TABLET ORAL
Qty: 30 TAB | Refills: 5 | Status: SHIPPED | OUTPATIENT
Start: 2018-03-12 | End: 2018-07-20 | Stop reason: SDUPTHER

## 2018-03-22 RX ORDER — PANTOPRAZOLE SODIUM 40 MG/1
TABLET, DELAYED RELEASE ORAL
Qty: 30 TAB | Refills: 2 | Status: SHIPPED | OUTPATIENT
Start: 2018-03-22 | End: 2018-06-24 | Stop reason: SDUPTHER

## 2018-04-02 DIAGNOSIS — R52 PAIN: Primary | ICD-10-CM

## 2018-04-02 DIAGNOSIS — F41.9 ANXIETY: ICD-10-CM

## 2018-04-02 RX ORDER — METOPROLOL SUCCINATE 50 MG/1
TABLET, EXTENDED RELEASE ORAL
Qty: 30 TAB | Refills: 5 | Status: SHIPPED | OUTPATIENT
Start: 2018-04-02 | End: 2018-05-29 | Stop reason: SDUPTHER

## 2018-04-02 NOTE — TELEPHONE ENCOUNTER
Pt called in requesting refill of his   Requested Prescriptions     Pending Prescriptions Disp Refills    traMADol (ULTRAM) 50 mg tablet 60 Tab 3     Sig: TAKE 2 TABLETS BY MOUTH EVERY 6 HOURS IF NEEDED(DO NOT TAKE MORE THAN 8 TABLETS A DAY)    ALPRAZolam (XANAX) 0.25 mg tablet 90 Tab 2     Sig: TAKE 1 TABLET BY MOUTH 3 TIMES DAILY AS NEEDED FOR ANXIETY. MAX DAILY AMOUNT IS 0.75MG (3 TABLETS)   .

## 2018-04-06 RX ORDER — ALPRAZOLAM 0.25 MG/1
TABLET ORAL
Qty: 90 TAB | Refills: 0 | Status: SHIPPED | OUTPATIENT
Start: 2018-04-06 | End: 2018-05-29 | Stop reason: SDUPTHER

## 2018-04-06 RX ORDER — TRAMADOL HYDROCHLORIDE 50 MG/1
TABLET ORAL
Qty: 60 TAB | Refills: 3 | Status: SHIPPED | OUTPATIENT
Start: 2018-04-06 | End: 2018-05-29 | Stop reason: SDUPTHER

## 2018-04-06 NOTE — TELEPHONE ENCOUNTER
Please notify patient that the refill has been approved and script is available in the office for .      check, nRF  Notes checked

## 2018-04-24 RX ORDER — QUETIAPINE FUMARATE 50 MG/1
TABLET, FILM COATED ORAL
Qty: 30 TAB | Refills: 3 | Status: SHIPPED | OUTPATIENT
Start: 2018-04-24 | End: 2018-07-20 | Stop reason: SDUPTHER

## 2018-04-24 RX ORDER — ROSUVASTATIN CALCIUM 5 MG/1
TABLET, COATED ORAL
Qty: 30 TAB | Refills: 4 | Status: SHIPPED | OUTPATIENT
Start: 2018-04-24 | End: 2018-08-01 | Stop reason: SDUPTHER

## 2018-05-01 ENCOUNTER — HOSPITAL ENCOUNTER (OUTPATIENT)
Dept: LAB | Age: 78
Discharge: HOME | End: 2018-05-01
Payer: MEDICARE

## 2018-05-01 DIAGNOSIS — E11.40 TYPE 2 DIABETES MELLITUS WITH DIABETIC NEUROPATHY, WITHOUT LONG-TERM CURRENT USE OF INSULIN (HCC): ICD-10-CM

## 2018-05-01 DIAGNOSIS — I10 HTN (HYPERTENSION), BENIGN: ICD-10-CM

## 2018-05-01 DIAGNOSIS — E78.5 DYSLIPIDEMIA: ICD-10-CM

## 2018-05-01 LAB
ALBUMIN SERPL-MCNC: 3.9 G/DL (ref 3.4–5)
ALBUMIN/GLOB SERPL: 1.1 {RATIO} (ref 0.8–1.7)
ALP SERPL-CCNC: 113 U/L (ref 45–117)
ALT SERPL-CCNC: 21 U/L (ref 16–61)
ANION GAP SERPL CALC-SCNC: 7 MMOL/L (ref 3–18)
AST SERPL-CCNC: 28 U/L (ref 15–37)
BILIRUB SERPL-MCNC: 1 MG/DL (ref 0.2–1)
BUN SERPL-MCNC: 15 MG/DL (ref 7–18)
BUN/CREAT SERPL: 12 (ref 12–20)
CALCIUM SERPL-MCNC: 8.4 MG/DL (ref 8.5–10.1)
CHLORIDE SERPL-SCNC: 105 MMOL/L (ref 100–108)
CHOLEST SERPL-MCNC: 109 MG/DL
CO2 SERPL-SCNC: 32 MMOL/L (ref 21–32)
CREAT SERPL-MCNC: 1.22 MG/DL (ref 0.6–1.3)
GLOBULIN SER CALC-MCNC: 3.6 G/DL (ref 2–4)
GLUCOSE SERPL-MCNC: 94 MG/DL (ref 74–99)
HBA1C MFR BLD: 6.2 % (ref 4.2–5.6)
HDLC SERPL-MCNC: 35 MG/DL (ref 40–60)
HDLC SERPL: 3.1 {RATIO} (ref 0–5)
LDLC SERPL CALC-MCNC: 36.6 MG/DL (ref 0–100)
LIPID PROFILE,FLP: ABNORMAL
POTASSIUM SERPL-SCNC: 3.8 MMOL/L (ref 3.5–5.5)
PROT SERPL-MCNC: 7.5 G/DL (ref 6.4–8.2)
SODIUM SERPL-SCNC: 144 MMOL/L (ref 136–145)
TRIGL SERPL-MCNC: 187 MG/DL (ref ?–150)
VLDLC SERPL CALC-MCNC: 37.4 MG/DL

## 2018-05-01 PROCEDURE — 36415 COLL VENOUS BLD VENIPUNCTURE: CPT | Performed by: INTERNAL MEDICINE

## 2018-05-01 PROCEDURE — 83036 HEMOGLOBIN GLYCOSYLATED A1C: CPT | Performed by: INTERNAL MEDICINE

## 2018-05-01 PROCEDURE — 80061 LIPID PANEL: CPT | Performed by: INTERNAL MEDICINE

## 2018-05-01 PROCEDURE — 80053 COMPREHEN METABOLIC PANEL: CPT | Performed by: INTERNAL MEDICINE

## 2018-05-04 ENCOUNTER — OFFICE VISIT (OUTPATIENT)
Dept: FAMILY MEDICINE CLINIC | Age: 78
End: 2018-05-04

## 2018-05-04 VITALS
TEMPERATURE: 97.7 F | SYSTOLIC BLOOD PRESSURE: 163 MMHG | BODY MASS INDEX: 37.05 KG/M2 | HEART RATE: 86 BPM | OXYGEN SATURATION: 94 % | DIASTOLIC BLOOD PRESSURE: 81 MMHG | WEIGHT: 258.8 LBS | HEIGHT: 70 IN | RESPIRATION RATE: 18 BRPM

## 2018-05-04 DIAGNOSIS — E78.5 DYSLIPIDEMIA: ICD-10-CM

## 2018-05-04 DIAGNOSIS — F41.9 ANXIETY: ICD-10-CM

## 2018-05-04 DIAGNOSIS — E11.40 TYPE 2 DIABETES MELLITUS WITH DIABETIC NEUROPATHY, WITHOUT LONG-TERM CURRENT USE OF INSULIN (HCC): ICD-10-CM

## 2018-05-04 DIAGNOSIS — I10 HTN (HYPERTENSION), BENIGN: ICD-10-CM

## 2018-05-04 DIAGNOSIS — Z00.00 MEDICARE ANNUAL WELLNESS VISIT, SUBSEQUENT: Primary | ICD-10-CM

## 2018-05-04 DIAGNOSIS — F02.818 LATE ONSET ALZHEIMER'S DISEASE WITH BEHAVIORAL DISTURBANCE (HCC): ICD-10-CM

## 2018-05-04 DIAGNOSIS — G30.1 LATE ONSET ALZHEIMER'S DISEASE WITH BEHAVIORAL DISTURBANCE (HCC): ICD-10-CM

## 2018-05-04 DIAGNOSIS — Z23 ENCOUNTER FOR IMMUNIZATION: ICD-10-CM

## 2018-05-04 NOTE — PROGRESS NOTES
Lizzette Bustillos is a 68 y.o. male presents in office for    Chief Complaint   Patient presents with    Diabetes    Hypertension    Annual Wellness Visit    Immunization/Injection     Prevnar 13         Health Maintenance Due   Topic Date Due    EYE EXAM RETINAL OR DILATED Q1  03/24/2016    FOOT EXAM Q1  06/15/2016    COLONOSCOPY  01/22/2017    GLAUCOMA SCREENING Q2Y  03/24/2017       1. Have you been to the ER, urgent care clinic since your last visit? Hospitalized since your last visit? No    2. Have you seen or consulted any other health care providers outside of the [facility] since your last visit? Include any pap smears or colon screening. No    This is the Subsequent Medicare Annual Wellness Exam, performed 12 months or more after the Initial AWV or the last Subsequent AWV    I have reviewed the patient's medical history in detail and updated the computerized patient record. History     Past Medical History:   Diagnosis Date    Anxiety     Arthritis     Cancer ([facility] 2009    bladder     Carotid duplex 05/26/2011    No occlusive disease >49% bilaterally.     Chronic back pain 5/6/2010    Chronic traumatic encephalopathy     Dementia     Depression     he has taken Xanax for 16 years for this    Diabetes ([facility]     Dizziness     ED (erectile dysfunction)     Fibrosis of knee joint     Peripatellar, right    Headache(784.0)     HTN (hypertension), benign 4/1/2010    Late onset Alzheimer's disease with behavioral disturbance     Left wrist pain     Lumbar spinal stenosis 9/25/2010    Neuropathy of lower extremity     Obesity     Osteoarthritis of both knees     Sleep apnea     cpap use    SOB (shortness of breath)     Vertigo, benign positional       Past Surgical History:   Procedure Laterality Date    BIOPSY  12/19/2007    HX OTHER SURGICAL  11/2014    right knee    HX UROLOGICAL      Bladder CA     Current Outpatient Prescriptions   Medication Sig Dispense Refill    QUEtiapine (SEROQUEL) 50 mg tablet TAKE 1 TABLET BY MOUTH ONCE DAILY AT BEDTIME 30 Tab 3    rosuvastatin (CRESTOR) 5 mg tablet TAKE 1 TABLET BY MOUTH AT BEDTIME IF NEEDED 30 Tab 4    amLODIPine (NORVASC) 10 mg tablet TAKE 1 TAB BY MOUTH DAILY FOR HYPERTENSION 30 Tab 5    traMADol (ULTRAM) 50 mg tablet TAKE 2 TABLETS BY MOUTH EVERY 6 HOURS IF NEEDED(DO NOT TAKE MORE THAN 8 TABLETS A DAY) 60 Tab 3    ALPRAZolam (XANAX) 0.25 mg tablet TAKE 1 TABLET BY MOUTH 3 TIMES DAILY AS NEEDED FOR ANXIETY. MAX DAILY AMOUNT IS 0.75MG (3 TABLETS) 90 Tab 0    metoprolol succinate (TOPROL-XL) 50 mg XL tablet TAKE 1 TABLET BY MOUTH ONCE DAILY 30 Tab 5    pantoprazole (PROTONIX) 40 mg tablet TAKE 1 TAB BY MOUTH DAILY. 30 Tab 2    escitalopram oxalate (LEXAPRO) 10 mg tablet TAKE 1 TABLET BY MOUTH ONCE DAILY 30 Tab 5    valsartan (DIOVAN) 160 mg tablet TAKE 1 TABLET BY MOUTH ONCE DAILY. 30 Tab 5    clotrimazole-betamethasone (LOTRISONE) topical cream Apply 2 g to affected area two (2) times a day. Indications: appply to affected skin bid and prn, apply pea size amount      memantine (NAMENDA) 10 mg tablet TAKE 1 TABLET BY MOUTH TWICE DAILY 60 Tab 3    levETIRAcetam (KEPPRA) 250 mg tablet Take 1 Tab by mouth two (2) times a day. 60 Tab 5    cholecalciferol (VITAMIN D3) 1,000 unit tablet Take 1,000 Units by mouth daily.  donepezil (ARICEPT) 23 mg film coated tablet   0    aspirin 81 mg chewable tablet Take 1 Tab by mouth daily. 30 Tab 0    OTHER Check CBC, CMP, Mg in 5 days, results to PCP immediately, Diagnosis- HTN 1 Each 0    multivitamin, tx-iron-ca-min (THERA-M W/ IRON) 9 mg iron-400 mcg tab tablet Take 1 Tab by mouth daily.       OTHER Compound Cream  Gabapentin 6%, Baclofen 2%, Cyclobenzaprine 2%, Lidocaine 2%, Flurbiprofen 10% with Ketamine 10%  Apply 1-2 grams (1-2 pumps) to affected area 3-4 times  120grams/2 refills       Allergies   Allergen Reactions    Chlorhexidine Towelette Itching Family History   Problem Relation Age of Onset    Hypertension Mother     Heart Disease Mother     Other Mother      Alzheimer's disease    Diabetes Neg Hx      Social History   Substance Use Topics    Smoking status: Former Smoker    Smokeless tobacco: Never Used      Comment: quit smoking in 2000    Alcohol use No     Patient Active Problem List   Diagnosis Code    Headache R51    Depression F32.9    ED (erectile dysfunction) N52.9    Vertigo, benign positional H81.10    Sleep apnea G47.30    HTN (hypertension), benign I10    Chronic back pain M54.9, G89.29    PN (peripheral neuropathy) G62.9    High triglycerides E78.1    Lumbar spinal stenosis M48.061    Anxiety F41.9    Fatigue R53.83    Memory loss R41.3    DM (diabetes mellitus) ([facility] E11.9    Malignant neoplasm of bladder, part unspecified C67.9    Urinary system symptoms, other     Borderline high cholesterol E78.9    SOB (shortness of breath) R06.02    Dizziness R42    Dementia F03.90    Dyslipidemia E78.5    Osteoarthritis of right knee M17.11    Anemia D64.9    GI bleed due to NSAIDs K92.2, T39.395A    CTE (chronic traumatic encephalopathy) F07.81    Status post total right knee replacement Z96.651    Primary osteoarthritis of left knee M17.12    ACP (advance care planning) Z71.89    Late onset Alzheimer's disease with behavioral disturbance G30.1, F02.81    ACS (acute coronary syndrome) (HCC) I24.9    Troponin level elevated R74.8    Sebaceous cyst L72.3       Depression Risk Factor Screening:   No flowsheet data found. Alcohol Risk Factor Screening:   Patient states he does not consume alcohol    Functional Ability and Level of Safety:   Hearing Loss  Hearing is good. Activities of Daily Living  The home contains: grab bars  Patient needs help with:  managing medications, dressing, bathing and bathroom needs    Fall Risk  Fall Risk Assessment, last 12 mths 2/9/2018   Able to walk?  Yes   Fall in past 15 months? No   Fall with injury? -   Number of falls in past 12 months -   Fall Risk Score -       Abuse Screen  Patient is not abused    Cognitive Screening   Evaluation of Cognitive Function:  Has your family/caregiver stated any concerns about your memory: yes  Wife has concerns about patient's memory    Patient Care Team   Patient Care Team:  Robert Adams MD as PCP - Stephenie Slade DPM (Podiatry)  Germaine Ziegler MD (Neurology)  Charity Luis MD (Orthopedic Surgery)  Janet Carrera MD (Ophthalmology)  Brendan Harmon RN as Nurse Navigator  Calista Crouch MD as Surgeon (Surgery)    Glaucoma Screening- followed by Dr MAN Last seen in 2016   Pneumonia Vaccine- needs PCV-13, given today   Shingles Vaccine-  Never had chicken pox  Tdap Vaccine- declines at this time   Colonoscopy-  Not candidate due to age and dementia   PSA- 6/16 0.8. No further testing needed   Advance Directive-  Pt has one. Will get copy   Assessment/Plan   Education and counseling provided:  Are appropriate based on today's review and evaluation  End-of-Life planning (with patient's consent)    Diagnoses and all orders for this visit:    1. Medicare annual wellness visit, subsequent    2. Type 2 diabetes mellitus with diabetic neuropathy, without long-term current use of insulin (HCC)  -     HEMOGLOBIN A1C W/O EAG; Future  -     MICROALBUMIN, UR, RAND W/ MICROALB/CREAT RATIO; Future    3. HTN (hypertension), benign  -     METABOLIC PANEL, COMPREHENSIVE; Future    4. Dyslipidemia  -     LIPID PANEL; Future    5. Anxiety    6. Encounter for immunization  -     Pneumococcal Conjugate vaccine - 13 valent (50 years and older)  -     Administration fee () for Medicare insured patients  -     ADMIN PNEUMOCOCCAL VACCINE  Medicare Injection Admin Charge    7.  Late onset Alzheimer's disease with behavioral disturbance        Health Maintenance Due   Topic Date Due    EYE EXAM RETINAL OR DILATED Q1  03/24/2016    FOOT EXAM Q1  06/15/2016    COLONOSCOPY  01/22/2017    GLAUCOMA SCREENING Q2Y  03/24/2017     A comprehensive 5 year plan for medical care and screening exams was reviewed with pt and they received a copy of it.

## 2018-05-04 NOTE — PROGRESS NOTES
Subjective:       Chief Complaint  The patient presents for follow up of diabetes, hypertension and high cholesterol. JOE Dick Sr. is a 68 y.o. male seen for follow up of diabetes. Cheo has hypertension and hyperlipidemia. Diabetes well controlled, no significant medication side effects noted, pt off metformin, hypertension normally well controlled, no significant medication side effects noted, on Norvasc, and Diovan, will increase Diovanif needed to better control BP, compliance may be an issue with his dementia hyperlipidemia well controlled, no significant medication side effects noted, on Crestor 5 mg    Diet and Lifestyle: not attempting to follow a low fat, low cholesterol diet, does not rigorously follow a diabetic diet, sedentary    Home BP Monitoring: is not measured at home. Diabetic Review of Systems - home glucose monitoring: is performed sporadically, should be 1x/day. Other symptoms and concerns: pt's Dementia continues to progress even  on Namenda and Aricept. His behavioral problems  and difficulty with insomnia have improved on Seroquel. Anxiety remains stable on Xanax, Lexapro and Seroquel. Pt's wife is currently coping with keeping him at home. 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 Pain is controlled on ultram. Have not yet done pain contract due to dementia. Current Outpatient Prescriptions   Medication Sig    QUEtiapine (SEROQUEL) 50 mg tablet TAKE 1 TABLET BY MOUTH ONCE DAILY AT BEDTIME    rosuvastatin (CRESTOR) 5 mg tablet TAKE 1 TABLET BY MOUTH AT BEDTIME IF NEEDED    amLODIPine (NORVASC) 10 mg tablet TAKE 1 TAB BY MOUTH DAILY FOR HYPERTENSION    traMADol (ULTRAM) 50 mg tablet TAKE 2 TABLETS BY MOUTH EVERY 6 HOURS IF NEEDED(DO NOT TAKE MORE THAN 8 TABLETS A DAY)    ALPRAZolam (XANAX) 0.25 mg tablet TAKE 1 TABLET BY MOUTH 3 TIMES DAILY AS NEEDED FOR ANXIETY.  MAX DAILY AMOUNT IS 0.75MG (3 TABLETS)    metoprolol succinate (TOPROL-XL) 50 mg XL tablet TAKE 1 TABLET BY MOUTH ONCE DAILY    pantoprazole (PROTONIX) 40 mg tablet TAKE 1 TAB BY MOUTH DAILY.  escitalopram oxalate (LEXAPRO) 10 mg tablet TAKE 1 TABLET BY MOUTH ONCE DAILY    valsartan (DIOVAN) 160 mg tablet TAKE 1 TABLET BY MOUTH ONCE DAILY.  clotrimazole-betamethasone (LOTRISONE) topical cream Apply 2 g to affected area two (2) times a day. Indications: appply to affected skin bid and prn, apply pea size amount    memantine (NAMENDA) 10 mg tablet TAKE 1 TABLET BY MOUTH TWICE DAILY    levETIRAcetam (KEPPRA) 250 mg tablet Take 1 Tab by mouth two (2) times a day.  cholecalciferol (VITAMIN D3) 1,000 unit tablet Take 1,000 Units by mouth daily.  donepezil (ARICEPT) 23 mg film coated tablet     aspirin 81 mg chewable tablet Take 1 Tab by mouth daily.  OTHER Check CBC, CMP, Mg in 5 days, results to PCP immediately, Diagnosis- HTN    multivitamin, tx-iron-ca-min (THERA-M W/ IRON) 9 mg iron-400 mcg tab tablet Take 1 Tab by mouth daily.  OTHER Compound Cream  Gabapentin 6%, Baclofen 2%, Cyclobenzaprine 2%, Lidocaine 2%, Flurbiprofen 10% with Ketamine 10%  Apply 1-2 grams (1-2 pumps) to affected area 3-4 times  120grams/2 refills     No current facility-administered medications for this visit.               Review of Systems  Respiratory: negative for dyspnea on exertion  Cardiovascular: negative for chest pain    Objective:     Visit Vitals    /81 (BP 1 Location: Left arm, BP Patient Position: Sitting)    Pulse 86    Temp 97.7 °F (36.5 °C) (Oral)    Resp 18    Ht 5' 10\" (1.778 m)    Wt 258 lb 12.8 oz (117.4 kg)    SpO2 94%    BMI 37.13 kg/m2        General appearance - alert, well appearing, and in no distress  Neck - supple, no significant adenopathy, carotids upstroke normal bilaterally, no bruits  Chest - clear to auscultation, no wheezes, rales or rhonchi, symmetric air entry  Heart - normal rate, regular rhythm, normal S1, S2, no murmurs, rubs, clicks or gallops  Neurological - paresthesias in feet   Extremities - peripheral pulses normal, no pedal edema, no clubbing or cyanosis  Skin - normal coloration and turgor, no rashes, no suspicious skin lesions noted      Labs:   Lab Results   Component Value Date/Time    Hemoglobin A1c 6.2 (H) 05/01/2018 09:07 AM    Hemoglobin A1c 5.9 (H) 12/29/2017 08:01 AM    Hemoglobin A1c 6.0 (H) 08/25/2017 08:35 AM    Glucose 94 05/01/2018 09:07 AM    Glucose (POC) 122 (H) 04/01/2016 11:16 AM    Microalbumin/Creat ratio (mg/g creat) 41 (H) 08/25/2017 08:35 AM    Microalbumin,urine random 4.90 (H) 08/25/2017 08:35 AM    LDL, calculated 36.6 05/01/2018 09:07 AM    Creatinine 1.22 05/01/2018 09:07 AM      Lab Results   Component Value Date/Time    Cholesterol, total 109 05/01/2018 09:07 AM    HDL Cholesterol 35 (L) 05/01/2018 09:07 AM    LDL, calculated 36.6 05/01/2018 09:07 AM    Triglyceride 187 (H) 05/01/2018 09:07 AM    CHOL/HDL Ratio 3.1 05/01/2018 09:07 AM     Lab Results   Component Value Date/Time    ALT (SGPT) 21 05/01/2018 09:07 AM    AST (SGOT) 28 05/01/2018 09:07 AM    Alk. phosphatase 113 05/01/2018 09:07 AM    Bilirubin, total 1.0 05/01/2018 09:07 AM     Lab Results   Component Value Date/Time    GFR est AA >60 05/01/2018 09:07 AM    GFR est non-AA 58 (L) 05/01/2018 09:07 AM    Creatinine 1.22 05/01/2018 09:07 AM    BUN 15 05/01/2018 09:07 AM    Sodium 144 05/01/2018 09:07 AM    Potassium 3.8 05/01/2018 09:07 AM    Chloride 105 05/01/2018 09:07 AM    CO2 32 05/01/2018 09:07 AM            Assessment / Plan     Diabetes well controlled, with diet   Hypertension normally well controlled, on current meds, will monitor on current meds for now since compliance may be an issue. Hyperlipidemia well controlled, on crestor. ICD-10-CM ICD-9-CM    1. Medicare annual wellness visit, subsequent Z00.00 V70.0    2.  Type 2 diabetes mellitus with diabetic neuropathy, without long-term current use of insulin ([facility]) E11.40 250.60 HEMOGLOBIN A1C W/O EAG     357.2 MICROALBUMIN, UR, RAND W/ MICROALB/CREAT RATIO   3. HTN (hypertension), benign Z46 853.1 METABOLIC PANEL, COMPREHENSIVE   4. Dyslipidemia E78.5 272.4 LIPID PANEL   5. Anxiety F41.9 300.00 Well controlled on Seroquel and Lexapro    6. Encounter for immunization Z23 V03.89 PNEUMOCOCCAL CONJ VACCINE 13 VALENT IM      ADMIN INFLUENZA VIRUS VAC      ADMIN PNEUMOCOCCAL VACCINE   7. Late onset Alzheimer's disease with behavioral disturbance G30.1 331.0 Stable on Aricept and Namenda along with Seroquel for the behavior     F02.81 294.11                Diabetic issues reviewed with him: diabetic diet discussed in detail, and low cholesterol diet, weight control and daily exercise discussed. Follow-up Disposition:  Return in about 4 months (around 9/4/2018) for labs 1 week before. Reviewed plan of care. Patient has provided input and agrees with goals.

## 2018-05-04 NOTE — PATIENT INSTRUCTIONS
Medicare Part B Preventive Services Limitations Recommendation Scheduled   Bone Mass Measurement  (age 72 & older, biennial) Requires diagnosis related to osteoporosis or estrogen deficiency. Biennial benefit unless patient has history of long-term glucocorticoid tx or baseline is needed because initial test was by other method  n/a   Cardiovascular Screening Blood Tests (every 5 years)  Total cholesterol, HDL, Triglycerides Order as a panel if possible  1/2018   Colorectal Cancer Screening  -Fecal occult blood test (annual)  -Flexible sigmoidoscopy (5y)  -Screening colonoscopy (10y)  -Barium Enema   n/a   Counseling to Prevent Tobacco Use (up to 8 sessions per year)  - Counseling greater than 3 and up to 10 minutes  - Counseling greater than 10 minutes Patients must be asymptomatic of tobacco-related conditions to receive as preventive service  n/a   Diabetes Screening Tests (at least every 3 years, Medicare covers annually or at 6-month intervals for prediabetic patients)    Fasting blood sugar (FBS) or glucose tolerance test (GTT) Patient must be diagnosed with one of the following:  -Hypertension, Dyslipidemia, obesity, previous impaired FBS or GTT  Or any two of the following: overweight, FH of diabetes, age ? 72, history of gestational diabetes, birth of baby weighing more than 9 pounds  1/2018   Diabetes Self-Management Training (DSMT) (no USPSTF recommendation) Requires referral by treating physician for patient with diabetes or renal disease. 10 hours of initial DSMT session of no less than 30 minutes each in a continuous 12-month period. 2 hours of follow-up DSMT in subsequent years.   n/a   Glaucoma Screening (no USPSTF recommendation) Diabetes mellitus, family history, , age 48 or over,  American, age 72 or over  Dr. Lizbeth Thao (HIV) Screening (annually for increased risk patients)  HIV-1 and HIV-2 by EIA, FLORENCIO, rapid antibody test, or oral mucosa transudate Patient must be at increased risk for HIV infection per USPSTF guidelines or pregnant. Tests covered annually for patients at increased risk. Pregnant patients may receive up to 3 test during pregnancy. n/a   Medical Nutrition Therapy (MNT) (for diabetes or renal disease not recommended schedule) Requires referral by treating physician for patient with diabetes or renal disease. Can be provided in same year as diabetes self-management training (DSMT), and CMS recommends medical nutrition therapy take place after DSMT. Up to 3 hours for initial year and 2 hours in subsequent years. n/a   Prostate Cancer Screening (annually up to age 76)  - Digital rectal exam (HEATHER)  - Prostate specific antigen (PSA) Annually (age 48 or over), HEATHER not paid separately when covered E/M service is provided on same date  Men up to age 76 may need a screening blood test for prostate cancer at certain intervals, depending on their personal and family history. This decision is between the patient and his provider. order   Seasonal Influenza Vaccination (annually)   1/2018     Pneumococcal Vaccination (once after 65)   10/2013   Hepatitis B Vaccinations (if medium/high risk) Medium/high risk factors:  End-stage renal disease,  Hemophiliacs who received Factor VIII or IX concentrates, Clients of institutions for the mentally retarded, Persons who live in the same house as a HepB virus carrier, Homosexual men, Illicit injectable drug abusers. n/a   Shingles Vaccination A shingles vaccine is also recommended once in a lifetime after age 61     Ultrasound Screening for Abdominal Aortic Aneurysm (AAA) (once) Patient must be referred through [facility] and not have had a screening for abdominal aortic aneurysm before under Medicare.   Limited to patients who meet one of the following criteria:  - Men who are 73-68 years old and have smoked more than 100 cigarettes in their lifetime.  -Anyone with a FH of AAA  -Anyone recommended for screening by USPSTF  n/a

## 2018-05-04 NOTE — MR AVS SNAPSHOT
[address] [place] [address] [address] [facility] [address] 
[phone] Patient: Hemalatha Welsh. MRN:  [id number] Visit Information Date & Time Provider Department Dept. Phone Encounter #  
 5/4/2018 11:00 AM Susan Gifford, [address] [place] [address] [phone] [id number] Follow-up Instructions Return in about 4 months (around 9/4/2018) for labs 1 week before. Upcoming Health Maintenance Date Due Pneumococcal 65+ High/Highest Risk (2 of 2 - PCV13) 10/10/2014 EYE EXAM RETINAL OR DILATED Q1 3/24/2016 FOOT EXAM Q1 6/15/2016 COLONOSCOPY 1/22/2017 GLAUCOMA SCREENING Q2Y 3/24/2017 MEDICARE YEARLY EXAM 3/14/2018 Influenza Age 5 to Adult 8/1/2018 MICROALBUMIN Q1 8/25/2018 HEMOGLOBIN A1C Q6M 11/1/2018 LIPID PANEL Q1 5/1/2019 DTaP/Tdap/Td series (2 - Td) 4/28/2027 Allergies as of 5/4/2018  Review Complete On: 2/27/2018 By: Ronnie Balderas MD  
  
 Severity Noted Reaction Type Reactions Chlorhexidine [place]  11/04/2014    Itching Current Immunizations  Reviewed on 1/2/2018 Name Date BCG Vaccine 10/28/2009 12:00 AM, 10/21/2009 12:00 AM, 4/8/2009 12:00 AM, 4/1/2009 12:00 AM, 3/25/2009 12:00 AM, 9/4/2008 12:00 AM, 8/27/2008 12:00 AM, 8/20/2008 12:00 AM  
 Influenza High Dose Vaccine PF 1/2/2018 Influenza Vaccine 10/23/2014 Influenza Vaccine (Quad) PF 10/21/2016, 10/12/2015 Influenza Vaccine PF 10/10/2013 Pneumococcal Polysaccharide (PPSV-23) 10/10/2013 Tdap 4/28/2017 Not reviewed this visit You Were Diagnosed With   
  
 Codes Comments Medicare annual wellness visit, subsequent    -  Primary ICD-10-CM: Z00.00 ICD-9-CM: V70.0 Type 2 diabetes mellitus with diabetic neuropathy, without long-term current use of insulin (HCC)     ICD-10-CM: E11.40 ICD-9-CM: 250.60, 357.2 HTN (hypertension), benign     ICD-10-CM: I10 
ICD-9-CM: 401.1 Dyslipidemia     ICD-10-CM: E78.5 ICD-9-CM: 272.4 Anxiety     ICD-10-CM: F41.9 ICD-9-CM: 300.00 Vitals BP Pulse Temp Resp Height(growth percentile) Weight(growth percentile) 163/81 (BP 1 Location: Left arm, BP Patient Position: Sitting) 86 97.7 °F (36.5 °C) (Oral) 18 5' 10\" (1.778 m) 258 lb 12.8 oz (117.4 kg) SpO2 BMI Smoking Status 94% 37.13 kg/m2 Former Smoker BMI and BSA Data Body Mass Index Body Surface Area  
 37.13 kg/m 2 2.41 m 2 Preferred Pharmacy Pharmacy Name Phone [facility] #[id number] Britney Newell, [address] [facility] [facility] [phone] Your Updated Medication List  
  
   
This list is accurate as of 5/4/18 11:31 AM.  Always use your most recent med list.  
  
  
  
  
 ALPRAZolam 0.25 mg tablet Commonly known as:  XANAX  
TAKE 1 TABLET BY MOUTH 3 TIMES DAILY AS NEEDED FOR ANXIETY. MAX DAILY AMOUNT IS 0.75MG (3 TABLETS) amLODIPine 10 mg tablet Commonly known as:  Sathish Matar TAKE 1 TAB BY MOUTH DAILY FOR HYPERTENSION  
  
 aspirin 81 mg chewable tablet Take 1 Tab by mouth daily. cholecalciferol 1,000 unit tablet Commonly known as:  VITAMIN D3 Take 1,000 Units by mouth daily. donepezil 23 mg film coated tablet Commonly known as:  ARICEPT  
  
 escitalopram oxalate 10 mg tablet Commonly known as:  Sharon Espinosa TAKE 1 TABLET BY MOUTH ONCE DAILY  
  
 levETIRAcetam 250 mg tablet Commonly known as:  KEPPRA Take 1 Tab by mouth two (2) times a day. LOTRISONE topical cream  
Generic drug:  clotrimazole-betamethasone Apply 2 g to affected area two (2) times a day. Indications: appply to affected skin bid and prn, apply pea size amount  
  
 memantine 10 mg tablet Commonly known as:  Pancho Long TAKE 1 TABLET BY MOUTH TWICE DAILY  
  
 metoprolol succinate 50 mg XL tablet Commonly known as:  TOPROL-XL  
TAKE 1 TABLET BY MOUTH ONCE DAILY  
  
 multivitamin, tx-iron-ca-min 9 mg iron-400 mcg Tab tablet Commonly known as:  THERA-M w/ IRON Take 1 Tab by mouth daily. OTHER Compound Cream Gabapentin 6%, Baclofen 2%, Cyclobenzaprine 2%, Lidocaine 2%, Flurbiprofen 10% with Ketamine 10% Apply 1-2 grams (1-2 pumps) to affected area 3-4 times 120grams/2 refills OTHER Check CBC, CMP, Mg in 5 days, results to PCP immediately, Diagnosis- HTN  
  
 pantoprazole 40 mg tablet Commonly known as:  PROTONIX  
TAKE 1 TAB BY MOUTH DAILY. QUEtiapine 50 mg tablet Commonly known as:  SEROquel TAKE 1 TABLET BY MOUTH ONCE DAILY AT BEDTIME  
  
 rosuvastatin 5 mg tablet Commonly known as:  CRESTOR  
TAKE 1 TABLET BY MOUTH AT BEDTIME IF NEEDED  
  
 traMADol 50 mg tablet Commonly known as:  ULTRAM  
TAKE 2 TABLETS BY MOUTH EVERY 6 HOURS IF NEEDED(DO NOT TAKE MORE THAN 8 TABLETS A DAY)  
  
 valsartan 160 mg tablet Commonly known as:  DIOVAN  
TAKE 1 TABLET BY MOUTH ONCE DAILY. Follow-up Instructions Return in about 4 months (around 9/4/2018) for labs 1 week before. Patient Instructions Medicare Part B Preventive Services Limitations Recommendation Scheduled Bone Mass Measurement 
(age 72 & older, biennial) Requires diagnosis related to osteoporosis or estrogen deficiency. Biennial benefit unless patient has history of long-term glucocorticoid tx or baseline is needed because initial test was by other method  n/a Cardiovascular Screening Blood Tests (every 5 years) Total cholesterol, HDL, Triglycerides Order as a panel if possible  1/2018 Colorectal Cancer Screening 
-Fecal occult blood test (annual) -Flexible sigmoidoscopy (5y) 
-Screening colonoscopy (10y) -Barium Enema   n/a Counseling to Prevent Tobacco Use (up to 8 sessions per year) - Counseling greater than 3 and up to 10 minutes - Counseling greater than 10 minutes Patients must be asymptomatic of tobacco-related conditions to receive as preventive service  n/a  
 Diabetes Screening Tests (at least every 3 years, Medicare covers annually or at 6-month intervals for prediabetic patients) Fasting blood sugar (FBS) or glucose tolerance test (GTT) Patient must be diagnosed with one of the following: 
-Hypertension, Dyslipidemia, obesity, previous impaired FBS or GTT 
Or any two of the following: overweight, FH of diabetes, age ? 72, history of gestational diabetes, birth of baby weighing more than 9 pounds  1/2018 Diabetes Self-Management Training (DSMT) (no USPSTF recommendation) Requires referral by treating physician for patient with diabetes or renal disease. 10 hours of initial DSMT session of no less than 30 minutes each in a continuous 12-month period. 2 hours of follow-up DSMT in subsequent years. n/a Glaucoma Screening (no USPSTF recommendation) Diabetes mellitus, family history, , age 48 or over,  American, age 72 or over  Dr. Monica Crocker Human Immunodeficiency Virus (HIV) Screening (annually for increased risk patients) HIV-1 and HIV-2 by EIA, FLORENCIO, rapid antibody test, or oral mucosa transudate Patient must be at increased risk for HIV infection per USPSTF guidelines or pregnant. Tests covered annually for patients at increased risk. Pregnant patients may receive up to 3 test during pregnancy. n/a Medical Nutrition Therapy (MNT) (for diabetes or renal disease not recommended schedule) Requires referral by treating physician for patient with diabetes or renal disease. Can be provided in same year as diabetes self-management training (DSMT), and CMS recommends medical nutrition therapy take place after DSMT. Up to 3 hours for initial year and 2 hours in subsequent years. n/a Prostate Cancer Screening (annually up to age 76) - Digital rectal exam (HEATHER) - Prostate specific antigen (PSA) Annually (age 48 or over), HEATHER not paid separately when covered E/M service is provided on same date Men up to age 76 may need a screening blood test for prostate cancer at certain intervals, depending on their personal and family history. This decision is between the patient and his provider. order Seasonal Influenza Vaccination (annually)   1/2018 Pneumococcal Vaccination (once after 65)   10/2013 Hepatitis B Vaccinations (if medium/high risk) Medium/high risk factors:  End-stage renal disease, Hemophiliacs who received Factor VIII or IX concentrates, Clients of institutions for the mentally retarded, Persons who live in the same house as a HepB virus carrier, Homosexual men, Illicit injectable drug abusers. n/a Shingles Vaccination A shingles vaccine is also recommended once in a lifetime after age 61 Ultrasound Screening for Abdominal Aortic Aneurysm (AAA) (once) Patient must be referred through [facility] and not have had a screening for abdominal aortic aneurysm before under Medicare. Limited to patients who meet one of the following criteria: 
- Men who are 73-68 years old and have smoked more than 100 cigarettes in their lifetime. 
-Anyone with a FH of AAA 
-Anyone recommended for screening by USPSTF  n/a Introducing [facility]! [employer] introduces [employer] patient portal. Now you can access parts of your medical record, email your doctor's office, and request medication refills online. 1. In your internet browser, go to [email or web address] 2. Click on the First Time User? Click Here link in the Sign In box. You will see the New Member Sign Up page. 3. Enter your ModiFace Access Code exactly as it appears below. You will not need to use this code after youve completed the sign-up process. If you do not sign up before the expiration date, you must request a new code. · [employer] Access Code: [email or web address] Expires: 7/3/2018 12:28 PM 
 
4.  Enter the last four digits of your Social Security Number (xxxx) and Date of Birth (mm/dd/yyyy) as indicated and click Submit. You will be taken to the next sign-up page. 5. Create a Tradesy ID. This will be your Tr[employer] login ID and cannot be changed, so think of one that is secure and easy to remember. 6. Create a Tr[employer]y password. You can change your password at any time. 7. Enter your Password Reset Question and Answer. This can be used at a later time if you forget your password. 8. Enter your e-mail address. You will receive e-mail notification when new information is available in [address] 9. Click Sign Up. You can now view and download portions of your medical record. 10. Click the Download Summary menu link to download a portable copy of your medical information. If you have questions, please visit the Frequently Asked Questions section of the [employer] website. Remember, [employer] is NOT to be used for urgent needs. For medical emergencies, dial 911. Now available from your iPhone and Android! Please provide this summary of care documentation to your next provider. Your primary care clinician is listed as PAOLA DERAS. If you have any questions after today's visit, please call [phone].

## 2018-05-04 NOTE — PROGRESS NOTES
Verbal order read back per Dr. Catia Dorado 13 vaccine. Patient received Prevnar 13 vaccine in Left deltoid. Patient was observed and no signs and symptoms of allergic reaction noted. Patient tolerated well and left without complaints. Patient received Prevnar VIS.

## 2018-05-29 DIAGNOSIS — F41.9 ANXIETY: ICD-10-CM

## 2018-05-29 DIAGNOSIS — R52 PAIN: ICD-10-CM

## 2018-05-29 RX ORDER — TRAMADOL HYDROCHLORIDE 50 MG/1
TABLET ORAL
Qty: 60 TAB | Refills: 3 | Status: SHIPPED | OUTPATIENT
Start: 2018-05-29 | End: 2019-01-01

## 2018-05-29 RX ORDER — ALPRAZOLAM 0.25 MG/1
TABLET ORAL
Qty: 90 TAB | Refills: 1 | Status: SHIPPED | OUTPATIENT
Start: 2018-05-29 | End: 2018-08-23 | Stop reason: SDUPTHER

## 2018-05-29 RX ORDER — METOPROLOL SUCCINATE 50 MG/1
TABLET, EXTENDED RELEASE ORAL
Qty: 30 TAB | Refills: 5 | Status: SHIPPED | OUTPATIENT
Start: 2018-05-29 | End: 2018-08-01 | Stop reason: SDUPTHER

## 2018-06-01 RX ORDER — MEMANTINE HYDROCHLORIDE 10 MG/1
TABLET ORAL
Qty: 180 TAB | Refills: 2 | Status: SHIPPED | OUTPATIENT
Start: 2018-06-01 | End: 2019-01-01 | Stop reason: SDUPTHER

## 2018-06-24 RX ORDER — PANTOPRAZOLE SODIUM 40 MG/1
TABLET, DELAYED RELEASE ORAL
Qty: 30 TAB | Refills: 2 | Status: SHIPPED | OUTPATIENT
Start: 2018-06-24 | End: 2018-07-20 | Stop reason: SDUPTHER

## 2018-07-20 RX ORDER — PANTOPRAZOLE SODIUM 40 MG/1
TABLET, DELAYED RELEASE ORAL
Qty: 90 TAB | Refills: 2 | Status: SHIPPED | OUTPATIENT
Start: 2018-07-20 | End: 2019-01-01 | Stop reason: SDUPTHER

## 2018-07-20 RX ORDER — ESCITALOPRAM OXALATE 10 MG/1
TABLET ORAL
Qty: 90 TAB | Refills: 2 | Status: SHIPPED | OUTPATIENT
Start: 2018-07-20 | End: 2019-01-01 | Stop reason: SDUPTHER

## 2018-07-20 RX ORDER — QUETIAPINE FUMARATE 50 MG/1
TABLET, FILM COATED ORAL
Qty: 90 TAB | Refills: 2 | Status: SHIPPED | OUTPATIENT
Start: 2018-07-20 | End: 2018-10-05 | Stop reason: SDUPTHER

## 2018-07-20 NOTE — TELEPHONE ENCOUNTER
Last OV 5/4/2018  Next OV 9/7/2018  Last refill  Pantoprazole 6/24/2018- patient would like 90 day supply  Quetiapine Fumarate 4/24/2018  Lexapro 3/12/2018

## 2018-07-30 RX ORDER — IRBESARTAN 150 MG/1
150 TABLET ORAL
Qty: 90 TAB | Refills: 3 | Status: SHIPPED | OUTPATIENT
Start: 2018-07-30 | End: 2019-01-01 | Stop reason: SDUPTHER

## 2018-07-30 RX ORDER — VALSARTAN 160 MG/1
TABLET ORAL
Qty: 90 TAB | Refills: 1 | Status: CANCELLED | OUTPATIENT
Start: 2018-07-30

## 2018-08-01 DIAGNOSIS — I10 HTN (HYPERTENSION), BENIGN: ICD-10-CM

## 2018-08-01 RX ORDER — AMLODIPINE BESYLATE 10 MG/1
TABLET ORAL
Qty: 90 TAB | Refills: 1 | Status: SHIPPED | OUTPATIENT
Start: 2018-08-01 | End: 2019-01-01 | Stop reason: SDUPTHER

## 2018-08-01 RX ORDER — METOPROLOL SUCCINATE 50 MG/1
TABLET, EXTENDED RELEASE ORAL
Qty: 90 TAB | Refills: 1 | Status: SHIPPED | OUTPATIENT
Start: 2018-08-01

## 2018-08-01 RX ORDER — ROSUVASTATIN CALCIUM 5 MG/1
TABLET, COATED ORAL
Qty: 90 TAB | Refills: 1 | Status: SHIPPED | OUTPATIENT
Start: 2018-08-01 | End: 2019-01-01 | Stop reason: SDUPTHER

## 2018-08-24 ENCOUNTER — TELEPHONE (OUTPATIENT)
Dept: FAMILY MEDICINE CLINIC | Age: 78
End: 2018-08-24

## 2018-09-06 ENCOUNTER — TELEPHONE (OUTPATIENT)
Dept: FAMILY MEDICINE CLINIC | Age: 78
End: 2018-09-06

## 2018-09-19 ENCOUNTER — TELEPHONE (OUTPATIENT)
Dept: FAMILY MEDICINE CLINIC | Age: 78
End: 2018-09-19

## 2018-09-28 ENCOUNTER — HOSPITAL ENCOUNTER (OUTPATIENT)
Dept: LAB | Age: 78
Discharge: HOME | End: 2018-09-28
Payer: MEDICARE

## 2018-09-28 DIAGNOSIS — E78.5 DYSLIPIDEMIA: ICD-10-CM

## 2018-09-28 DIAGNOSIS — I10 HTN (HYPERTENSION), BENIGN: ICD-10-CM

## 2018-09-28 DIAGNOSIS — E11.40 TYPE 2 DIABETES MELLITUS WITH DIABETIC NEUROPATHY, WITHOUT LONG-TERM CURRENT USE OF INSULIN (HCC): ICD-10-CM

## 2018-09-28 LAB
ALBUMIN SERPL-MCNC: 4 G/DL (ref 3.4–5)
ALBUMIN/GLOB SERPL: 1 {RATIO} (ref 0.8–1.7)
ALP SERPL-CCNC: 108 U/L (ref 45–117)
ALT SERPL-CCNC: 25 U/L (ref 16–61)
ANION GAP SERPL CALC-SCNC: 9 MMOL/L (ref 3–18)
AST SERPL-CCNC: 29 U/L (ref 15–37)
BILIRUB SERPL-MCNC: 1.5 MG/DL (ref 0.2–1)
BUN SERPL-MCNC: 16 MG/DL (ref 7–18)
BUN/CREAT SERPL: 13 (ref 12–20)
CALCIUM SERPL-MCNC: 8.3 MG/DL (ref 8.5–10.1)
CHLORIDE SERPL-SCNC: 106 MMOL/L (ref 100–108)
CHOLEST SERPL-MCNC: 123 MG/DL
CO2 SERPL-SCNC: 30 MMOL/L (ref 21–32)
CREAT SERPL-MCNC: 1.24 MG/DL (ref 0.6–1.3)
GLOBULIN SER CALC-MCNC: 4.1 G/DL (ref 2–4)
GLUCOSE SERPL-MCNC: 99 MG/DL (ref 74–99)
HBA1C MFR BLD: 6.4 % (ref 4.2–5.6)
HDLC SERPL-MCNC: 37 MG/DL (ref 40–60)
HDLC SERPL: 3.3 {RATIO} (ref 0–5)
LDLC SERPL CALC-MCNC: 58.2 MG/DL (ref 0–100)
LIPID PROFILE,FLP: ABNORMAL
POTASSIUM SERPL-SCNC: 3.9 MMOL/L (ref 3.5–5.5)
PROT SERPL-MCNC: 8.1 G/DL (ref 6.4–8.2)
SODIUM SERPL-SCNC: 145 MMOL/L (ref 136–145)
TRIGL SERPL-MCNC: 139 MG/DL (ref ?–150)
VLDLC SERPL CALC-MCNC: 27.8 MG/DL

## 2018-09-28 PROCEDURE — 80053 COMPREHEN METABOLIC PANEL: CPT | Performed by: INTERNAL MEDICINE

## 2018-09-28 PROCEDURE — 83036 HEMOGLOBIN GLYCOSYLATED A1C: CPT | Performed by: INTERNAL MEDICINE

## 2018-09-28 PROCEDURE — 82043 UR ALBUMIN QUANTITATIVE: CPT | Performed by: INTERNAL MEDICINE

## 2018-09-28 PROCEDURE — 80061 LIPID PANEL: CPT | Performed by: INTERNAL MEDICINE

## 2018-09-28 PROCEDURE — 36415 COLL VENOUS BLD VENIPUNCTURE: CPT | Performed by: INTERNAL MEDICINE

## 2018-09-29 LAB
CREAT UR-MCNC: 252.21 MG/DL (ref 30–125)
MICROALBUMIN UR-MCNC: 1.6 MG/DL (ref 0–3)
MICROALBUMIN/CREAT UR-RTO: 6 MG/G (ref 0–30)

## 2018-10-05 ENCOUNTER — OFFICE VISIT (OUTPATIENT)
Dept: FAMILY MEDICINE CLINIC | Age: 78
End: 2018-10-05

## 2018-10-05 VITALS
WEIGHT: 247.8 LBS | RESPIRATION RATE: 20 BRPM | OXYGEN SATURATION: 97 % | HEIGHT: 70 IN | SYSTOLIC BLOOD PRESSURE: 116 MMHG | BODY MASS INDEX: 35.48 KG/M2 | DIASTOLIC BLOOD PRESSURE: 68 MMHG | HEART RATE: 71 BPM | TEMPERATURE: 97.9 F

## 2018-10-05 DIAGNOSIS — F02.818 LATE ONSET ALZHEIMER'S DISEASE WITH BEHAVIORAL DISTURBANCE (HCC): ICD-10-CM

## 2018-10-05 DIAGNOSIS — F41.9 ANXIETY: ICD-10-CM

## 2018-10-05 DIAGNOSIS — I10 HTN (HYPERTENSION), BENIGN: ICD-10-CM

## 2018-10-05 DIAGNOSIS — G30.1 LATE ONSET ALZHEIMER'S DISEASE WITH BEHAVIORAL DISTURBANCE (HCC): ICD-10-CM

## 2018-10-05 DIAGNOSIS — G89.29 CHRONIC MIDLINE LOW BACK PAIN WITHOUT SCIATICA: ICD-10-CM

## 2018-10-05 DIAGNOSIS — E11.21 TYPE 2 DIABETES WITH NEPHROPATHY (HCC): Primary | ICD-10-CM

## 2018-10-05 DIAGNOSIS — E78.5 DYSLIPIDEMIA: ICD-10-CM

## 2018-10-05 DIAGNOSIS — Z23 ENCOUNTER FOR IMMUNIZATION: ICD-10-CM

## 2018-10-05 DIAGNOSIS — M54.50 CHRONIC MIDLINE LOW BACK PAIN WITHOUT SCIATICA: ICD-10-CM

## 2018-10-05 PROBLEM — E11.40 TYPE 2 DIABETES MELLITUS WITH DIABETIC NEUROPATHY (HCC): Status: ACTIVE | Noted: 2018-10-05

## 2018-10-05 PROBLEM — E66.01 SEVERE OBESITY (HCC): Status: ACTIVE | Noted: 2018-10-05

## 2018-10-05 RX ORDER — QUETIAPINE FUMARATE 50 MG/1
TABLET, FILM COATED ORAL
Qty: 90 TAB | Refills: 2 | Status: SHIPPED | OUTPATIENT
Start: 2018-10-05 | End: 2019-01-01 | Stop reason: SDUPTHER

## 2018-10-05 NOTE — MR AVS SNAPSHOT
[address] [place] [address] [facility] [address] 
[phone] Patient: Graeth Gasca. MRN:  [facility]:[id number] Visit Information Date & Time Provider Department Dept. Phone Encounter #  
 10/5/2018 10:15 AM Juanita Arechiga, [address] [place] [address] [id number] Follow-up Instructions Return in about 4 months (around 2/5/2019) for labs 1 week before. Upcoming Health Maintenance Date Due Shingrix Vaccine Age 50> (1 of 2) 6/16/1990 FOOT EXAM Q1 6/15/2016 COLONOSCOPY 1/22/2017 EYE EXAM RETINAL OR DILATED Q1 6/6/2018 Influenza Age 5 to Adult 8/1/2018 HEMOGLOBIN A1C Q6M 3/28/2019 MEDICARE YEARLY EXAM 5/5/2019 GLAUCOMA SCREENING Q2Y 6/6/2019 MICROALBUMIN Q1 9/28/2019 LIPID PANEL Q1 9/28/2019 DTaP/Tdap/Td series (2 - Td) 4/28/2027 Allergies as of 10/5/2018  Review Complete On: 10/5/2018 By: Juanita Arechiga MD  
  
 Severity Noted Reaction Type Reactions Chlorhexidine [place]elette  11/04/2014    Itching Current Immunizations  Reviewed on 10/5/2018 Name Date BCG Vaccine 10/28/2009 12:00 AM, 10/21/2009 12:00 AM, 4/8/2009 12:00 AM, 4/1/2009 12:00 AM, 3/25/2009 12:00 AM, 9/4/2008 12:00 AM, 8/27/2008 12:00 AM, 8/20/2008 12:00 AM  
 Influenza High Dose Vaccine PF 1/2/2018 Influenza Vaccine 10/23/2014 Influenza Vaccine (Quad) PF 10/21/2016, 10/12/2015 Influenza Vaccine (Tri) Adjuvanted 10/5/2018 Influenza Vaccine PF 10/10/2013 Pneumococcal Conjugate (PCV-13) 5/4/2018 Pneumococcal Polysaccharide (PPSV-23) 10/10/2013 Tdap 4/28/2017 Reviewed by Jose Angel Arthur LPN on 25/2/1247 at 82:87 AM  
You Were Diagnosed With   
  
 Codes Comments Type 2 diabetes with nephropathy (HCC)    -  Primary ICD-10-CM: E11.21 
ICD-9-CM: 250.40, 583.81 HTN (hypertension), benign     ICD-10-CM: I10 
ICD-9-CM: 401.1 Dyslipidemia     ICD-10-CM: E78.5 ICD-9-CM: 272.4 Encounter for immunization     ICD-10-CM: Z63 ICD-9-CM: V03.89 Late onset Alzheimer's disease with behavioral disturbance     ICD-10-CM: G30.1, F02.81 ICD-9-CM: 331.0, 294.11 Vitals BP Pulse Temp Resp Height(growth percentile) Weight(growth percentile) 116/68 (BP 1 Location: Left arm, BP Patient Position: Sitting) 71 97.9 °F (36.6 °C) (Oral) 20 5' 10\" (1.778 m) 247 lb 12.8 oz (112.4 kg) SpO2 BMI Smoking Status 97% 35.56 kg/m2 Former Smoker BMI and BSA Data Body Mass Index Body Surface Area 35.56 kg/m 2 2.36 m 2 Preferred Pharmacy Pharmacy Name Phone [facility]/PHARMACY #[id number] Jess Madsen, [facility],4Th Floor [place] [phone] Your Updated Medication List  
  
   
This list is accurate as of 10/5/18 10:35 AM.  Always use your most recent med list.  
  
  
  
  
 ALPRAZolam 0.25 mg tablet Commonly known as:  XANAX  
TAKE 1 TABLET BY MOUTH 3 TIMES DAILY AS NEEDED FOR ANXIETY. MAX DAILY AMOUNT OF 3 TABS/DAY  
  
 amLODIPine 10 mg tablet Commonly known as:  Shereen Leaver TAKE 1 TAB BY MOUTH DAILY FOR HYPERTENSION  
  
 aspirin 81 mg chewable tablet Take 1 Tab by mouth daily. cholecalciferol 1,000 unit tablet Commonly known as:  VITAMIN D3 Take 1,000 Units by mouth daily. donepezil 23 mg film coated tablet Commonly known as:  ARICEPT  
  
 escitalopram oxalate 10 mg tablet Commonly known as:  Donato Yoon TAKE 1 TABLET BY MOUTH ONCE DAILY  
  
 irbesartan 150 mg tablet Commonly known as:  AVAPRO Take 1 Tab by mouth nightly. levETIRAcetam 250 mg tablet Commonly known as:  KEPPRA Take 1 Tab by mouth two (2) times a day. LOTRISONE topical cream  
Generic drug:  clotrimazole-betamethasone Apply 2 g to affected area two (2) times a day. Indications: appply to affected skin bid and prn, apply pea size amount  
  
 memantine 10 mg tablet Commonly known as:  Ana Maria Keeley TAKE 1 TABLET BY MOUTH TWICE DAILY metoprolol succinate 50 mg XL tablet Commonly known as:  TOPROL-XL  
TAKE 1 TABLET BY MOUTH ONCE DAILY  
  
 multivitamin, tx-iron-ca-min 9 mg iron-400 mcg Tab tablet Commonly known as:  THERA-M w/ IRON Take 1 Tab by mouth daily. OTHER Compound Cream Gabapentin 6%, Baclofen 2%, Cyclobenzaprine 2%, Lidocaine 2%, Flurbiprofen 10% with Ketamine 10% Apply 1-2 grams (1-2 pumps) to affected area 3-4 times 120grams/2 refills OTHER Check CBC, CMP, Mg in 5 days, results to PCP immediately, Diagnosis- HTN  
  
 pantoprazole 40 mg tablet Commonly known as:  PROTONIX  
TAKE 1 TAB BY MOUTH DAILY. QUEtiapine 50 mg tablet Commonly known as:  SEROquel TAKE 1 TABLET BY MOUTH ONCE DAILY AT BEDTIME  
  
 rosuvastatin 5 mg tablet Commonly known as:  CRESTOR  
TAKE 1 TABLET BY MOUTH AT BEDTIME IF NEEDED  
  
 traMADol 50 mg tablet Commonly known as:  ULTRAM  
TAKE 2 TABLETS BY MOUTH EVERY 6 HOURS IF NEEDED(DO NOT TAKE MORE THAN 8 TABLETS A DAY) Prescriptions Sent to Pharmacy Refills QUEtiapine (SEROQUEL) 50 mg tablet 2 Sig: TAKE 1 TABLET BY MOUTH ONCE DAILY AT BEDTIME Class: Normal  
 Pharmacy: [facility]/pharmacy [address] [place] [address], [facility],[address]Th Floor [address] #: [phone] We Performed the Following ADMIN INFLUENZA VIRUS VAC [ [facility]] INFLUENZA VACCINE INACTIVATED (IIV), SUBUNIT, ADJUVANTED, IM [id number] CPT(R)] Follow-up Instructions Return in about 4 months (around 2/5/2019) for labs 1 week before. Patient Instructions Influenza (Flu) Vaccine (Inactivated or Recombinant): What You Need to Know Why get vaccinated? Influenza (\"flu\") is a contagious disease that spreads around the [place] every winter, usually between October and May. Flu is caused by influenza viruses and is spread mainly by coughing, sneezing, and close contact. Anyone can get flu. Flu strikes suddenly and can last several days. Symptoms vary by age, but can include: · Fever/chills. · Sore throat. · Muscle aches. · Fatigue. · Cough. · Headache. · Runny or stuffy nose. Flu can also lead to pneumonia and blood infections, and cause diarrhea and seizures in children. If you have a medical condition, such as heart or lung disease, flu can make it worse. Flu is more dangerous for some people. Infants and young children, people 72years of age and older, pregnant women, and people with certain health conditions or a weakened immune system are at greatest risk. Each year thousands of people in the [facility] die from flu, and many more are hospitalized. Flu vaccine can: · Keep you from getting flu. · Make flu less severe if you do get it. · Keep you from spreading flu to your family and other people. Inactivated and recombinant flu vaccines A dose of flu vaccine is recommended every flu season. Children 6 months through 6years of age may need two doses during the same flu season. Everyone else needs only one dose each flu season. Some inactivated flu vaccines contain a very small amount of a mercury-based preservative called thimerosal. Studies have not shown thimerosal in vaccines to be harmful, but flu vaccines that do not contain thimerosal are available. There is no live flu virus in flu shots. They cannot cause the flu. There are many flu viruses, and they are always changing. Each year a new flu vaccine is made to protect against three or four viruses that are likely to cause disease in the upcoming flu season. But even when the vaccine doesn't exactly match these viruses, it may still provide some protection. Flu vaccine cannot prevent: · Flu that is caused by a virus not covered by the vaccine. · Illnesses that look like flu but are not. Some people should not get this vaccine Tell the person who is giving you the vaccine: · If you have any severe (life-threatening) allergies.  If you ever had a life-threatening allergic reaction after a dose of flu vaccine, or have a severe allergy to any part of this vaccine, you may be advised not to get vaccinated. Most, but not all, types of flu vaccine contain a small amount of egg protein. · If you ever had Guillain-Barré syndrome (also called GBS) Some people with a history of GBS should not get this vaccine. This should be discussed with your doctor. · If you are not feeling well. It is usually okay to get flu vaccine when you have a mild illness, but you might be asked to come back when you feel better. Risks of a vaccine reaction With any medicine, including vaccines, there is a chance of reactions. These are usually mild and go away on their own, but serious reactions are also possible. Most people who get a flu shot do not have any problems with it. Minor problems following a flu shot include: · Soreness, redness, or swelling where the shot was given · Hoarseness · Sore, red or itchy eyes · Cough · Fever · Aches · Headache · Itching · Fatigue If these problems occur, they usually begin soon after the shot and last 1 or 2 days. More serious problems following a flu shot can include the following: · There may be a small increased risk of Guillain-Barré Syndrome (GBS) after inactivated flu vaccine. This risk has been estimated at 1 or 2 additional cases per million people vaccinated. This is much lower than the risk of severe complications from flu, which can be prevented by flu vaccine. · Treva Salguero children who get the flu shot along with pneumococcal vaccine (PCV13) and/or DTaP vaccine at the same time might be slightly more likely to have a seizure caused by fever. Ask your doctor for more information. Tell your doctor if a child who is getting flu vaccine has ever had a seizure Problems that could happen after any injected vaccine: · People sometimes faint after a medical procedure, including vaccination. Sitting or lying down for about 15 minutes can help prevent fainting, and injuries caused by a fall. Tell your doctor if you feel dizzy, or have vision changes or ringing in the ears. · Some people get severe pain in the shoulder and have difficulty moving the arm where a shot was given. This happens very rarely. · Any medication can cause a severe allergic reaction. Such reactions from a vaccine are very rare, estimated at about 1 in a million doses, and would happen within a few minutes to a few hours after the vaccination. As with any medicine, there is a very remote chance of a vaccine causing a serious injury or death. The safety of vaccines is always being monitored. For more information, visit: www.cdc.gov/vaccinesafety/. What if there is a serious reaction? What should I look for? · Look for anything that concerns you, such as signs of a severe allergic reaction, very high fever, or unusual behavior. Signs of a severe allergic reaction can include hives, swelling of the face and throat, difficulty breathing, a fast heartbeat, dizziness, and weakness  usually within a few minutes to a few hours after the vaccination. What should I do? · If you think it is a severe allergic reaction or other emergency that can't wait, call 9-1-1 and get the person to the nearest hospital. Otherwise, call your doctor. · Reactions should be reported to the \"Vaccine Adverse Event Reporting System\" (VAERS). Your doctor should file this report, or you can do it yourself through the VAERS website at www.vaers. hhs.gov, or by calling 8-463.981.7352. VAERS does not give medical advice. The National Vaccine Injury Compensation Program 
The National Vaccine Injury Compensation Program (VICP) is a federal program that was created to compensate people who may have been injured by certain vaccines.  
Persons who believe they may have been injured by a vaccine can learn about the program and about filing a claim by calling 0-618.968.2662 or visiting the [employer] website at www.[facility].gov/vaccinecompensation. There is a time limit to file a claim for compensation. How can I learn more? · Ask your healthcare provider. He or she can give you the vaccine package insert or suggest other sources of information. · Call your local or state health department. · Contact the Centers for Disease Control and Prevention (CDC): 
¨ Call 3-348.509.5470 (1-800-CDC-INFO) or ¨ Visit CDC's website at www.cdc.gov/flu Vaccine Information Statement Inactivated Influenza Vaccine 8/7/2015) 42 U. Cheryle Pauling [id number]-84 [facility] [employer] Centers for Disease Control and Prevention Many Vaccine Information Statements are available in [place] and other languages. See www.immunize.org/vis. Muchas hojas de información sobre vacunas están disponibles en español y en otros idiomas. Visite www.immunize.org/vis. Care instructions adapted under license by [employer] (which disclaims liability or warranty for this information). If you have questions about a medical condition or this instruction, always ask your healthcare professional. [address] any warranty or liability for your use of this information. Advance Directives: Care Instructions Your Care Instructions An advance directive is a legal way to state your wishes at the end of your life. It tells your family and your doctor what to do if you can no longer say what you want. There are two main types of advance directives. You can change them any time that your wishes change. · A living will tells your family and your doctor your wishes about life support and other treatment. · A durable power of  for health care lets you name a person to make treatment decisions for you when you can't speak for yourself. This person is called a health care agent. If you do not have an advance directive, decisions about your medical care may be made by a doctor or a  who doesn't know you. It may help to think of an advance directive as a gift to the people who care for you. If you have one, they won't have to make tough decisions by themselves. Follow-up care is a key part of your treatment and safety. Be sure to make and go to all appointments, and call your doctor if you are having problems. It's also a good idea to know your test results and keep a list of the medicines you take. How can you care for yourself at home? · Discuss your wishes with your loved ones and your doctor. This way, there are no surprises. · Many states have a unique form. Or you might use a universal form that has been approved by many states. This kind of form can sometimes be completed and stored online. Your electronic copy will then be available wherever you have a connection to the Internet. In most cases, doctors will respect your wishes even if you have a form from a different state. · You don't need a  to do an advance directive. But you may want to get legal advice. · Think about these questions when you prepare an advance directive: ¨ Who do you want to make decisions about your medical care if you are not able to? Many people choose a family member or close friend. ¨ Do you know enough about life support methods that might be used? If not, talk to your doctor so you understand. ¨ What are you most afraid of that might happen? You might be afraid of having pain, losing your independence, or being kept alive by machines. ¨ Where would you prefer to die? Choices include your home, a hospital, or a nursing home. ¨ Would you like to have information about hospice care to support you and your family? ¨ Do you want to donate organs when you die? ¨ Do you want certain Mormon practices performed before you die? If so, put your wishes in the advance directive. · Read your advance directive every year, and make changes as needed. When should you call for help? Be sure to contact your doctor if you have any questions. Where can you learn more? Go to http://christie-brigid.info/. Enter R264 in the search box to learn more about \"Advance Directives: Care Instructions. \" Current as of: April 19, 2018 Content Version: 11.8 © 7004-2677 [employer]. Care instructions adapted under license by [employer] (which disclaims liability or warranty for this information). If you have questions about a medical condition or this instruction, always ask your healthcare professional. [address] any warranty or liability for your use of this information. Introducing [facility]! Beny Willams introduces [employer] patient portal. Now you can access parts of your medical record, email your doctor's office, and request medication refills online. 1. In your internet browser, go to [email or web address]://[employer]. [employer] 2. Click on the First Time User? Click Here link in the Sign In box. You will see the New Member Sign Up page. 3. Enter your [employer] Access Code exactly as it appears below. You will not need to use this code after youve completed the sign-up process. If you do not sign up before the expiration date, you must request a new code. · [employer] Access Code: [email or web address] Expires: 1/3/2019 10:35 AM 
 
4. Enter the last four digits of your Social Security Number (xxxx) and Date of Birth (mm/dd/yyyy) as indicated and click Submit. You will be taken to the next sign-up page. 5. Create a [employer] ID. This will be your [employer] login ID and cannot be changed, so think of one that is secure and easy to remember. 6. Create a [employer] password. You can change your password at any time. 7. Enter your Password Reset Question and Answer. This can be used at a later time if you forget your password. 8. Enter your e-mail address. You will receive e-mail notification when new information is available in [address] 9. Click Sign Up. You can now view and download portions of your medical record. 10. Click the Download Summary menu link to download a portable copy of your medical information. If you have questions, please visit the Frequently Asked Questions section of the [employer] website. Remember, [employer] is NOT to be used for urgent needs. For medical emergencies, dial 911. Now available from your iPhone and Android! Please provide this summary of care documentation to your next provider. Your primary care clinician is listed as PAOLA DERAS. If you have any questions after today's visit, please call [phone].

## 2018-10-05 NOTE — PATIENT INSTRUCTIONS
Influenza (Flu) Vaccine (Inactivated or Recombinant): What You Need to Know Why get vaccinated? Influenza (\"flu\") is a contagious disease that spreads around the [place] every winter, usually between October and May. Flu is caused by influenza viruses and is spread mainly by coughing, sneezing, and close contact. Anyone can get flu. Flu strikes suddenly and can last several days. Symptoms vary by age, but can include: · Fever/chills. · Sore throat. · Muscle aches. · Fatigue. · Cough. · Headache. · Runny or stuffy nose. Flu can also lead to pneumonia and blood infections, and cause diarrhea and seizures in children. If you have a medical condition, such as heart or lung disease, flu can make it worse. Flu is more dangerous for some people. Infants and young children, people 72years of age and older, pregnant women, and people with certain health conditions or a weakened immune system are at greatest risk. Each year thousands of people in the [facility] die from flu, and many more are hospitalized. Flu vaccine can: · Keep you from getting flu. · Make flu less severe if you do get it. · Keep you from spreading flu to your family and other people. Inactivated and recombinant flu vaccines A dose of flu vaccine is recommended every flu season. Children 6 months through 6years of age may need two doses during the same flu season. Everyone else needs only one dose each flu season. Some inactivated flu vaccines contain a very small amount of a mercury-based preservative called thimerosal. Studies have not shown thimerosal in vaccines to be harmful, but flu vaccines that do not contain thimerosal are available. There is no live flu virus in flu shots. They cannot cause the flu. There are many flu viruses, and they are always changing. Each year a new flu vaccine is made to protect against three or four viruses that are likely to cause disease in the upcoming flu season.  But even when the vaccine doesn't exactly match these viruses, it may still provide some protection. Flu vaccine cannot prevent: · Flu that is caused by a virus not covered by the vaccine. · Illnesses that look like flu but are not. Some people should not get this vaccine Tell the person who is giving you the vaccine: · If you have any severe (life-threatening) allergies. If you ever had a life-threatening allergic reaction after a dose of flu vaccine, or have a severe allergy to any part of this vaccine, you may be advised not to get vaccinated. Most, but not all, types of flu vaccine contain a small amount of egg protein. · If you ever had Guillain-Barré syndrome (also called GBS) Some people with a history of GBS should not get this vaccine. This should be discussed with your doctor. · If you are not feeling well. It is usually okay to get flu vaccine when you have a mild illness, but you might be asked to come back when you feel better. Risks of a vaccine reaction With any medicine, including vaccines, there is a chance of reactions. These are usually mild and go away on their own, but serious reactions are also possible. Most people who get a flu shot do not have any problems with it. Minor problems following a flu shot include: · Soreness, redness, or swelling where the shot was given · Hoarseness · Sore, red or itchy eyes · Cough · Fever · Aches · Headache · Itching · Fatigue If these problems occur, they usually begin soon after the shot and last 1 or 2 days. More serious problems following a flu shot can include the following: · There may be a small increased risk of Guillain-Barré Syndrome (GBS) after inactivated flu vaccine. This risk has been estimated at 1 or 2 additional cases per million people vaccinated. This is much lower than the risk of severe complications from flu, which can be prevented by flu vaccine.  
· Marli Kang children who get the flu shot along with pneumococcal vaccine (PCV13) and/or DTaP vaccine at the same time might be slightly more likely to have a seizure caused by fever. Ask your doctor for more information. Tell your doctor if a child who is getting flu vaccine has ever had a seizure Problems that could happen after any injected vaccine: · People sometimes faint after a medical procedure, including vaccination. Sitting or lying down for about 15 minutes can help prevent fainting, and injuries caused by a fall. Tell your doctor if you feel dizzy, or have vision changes or ringing in the ears. · Some people get severe pain in the shoulder and have difficulty moving the arm where a shot was given. This happens very rarely. · Any medication can cause a severe allergic reaction. Such reactions from a vaccine are very rare, estimated at about 1 in a million doses, and would happen within a few minutes to a few hours after the vaccination. As with any medicine, there is a very remote chance of a vaccine causing a serious injury or death. The safety of vaccines is always being monitored. For more information, visit: www.cdc.gov/vaccinesafety/. What if there is a serious reaction? What should I look for? · Look for anything that concerns you, such as signs of a severe allergic reaction, very high fever, or unusual behavior. Signs of a severe allergic reaction can include hives, swelling of the face and throat, difficulty breathing, a fast heartbeat, dizziness, and weakness  usually within a few minutes to a few hours after the vaccination. What should I do? · If you think it is a severe allergic reaction or other emergency that can't wait, call 9-1-1 and get the person to the nearest hospital. Otherwise, call your doctor. · Reactions should be reported to the \"Vaccine Adverse Event Reporting System\" (VAERS). Your doctor should file this report, or you can do it yourself through the VAERS website at www.vaers. hhs.gov, or by calling 8-563.273.4297. [facility] does not give medical advice. The National Vaccine Injury Compensation Program 
The National Vaccine Injury Compensation Program (VICP) is a federal program that was created to compensate people who may have been injured by certain vaccines. Persons who believe they may have been injured by a vaccine can learn about the program and about filing a claim by calling 6-692.111.9231 or visiting the [employer] website at www.[facility].gov/vaccinecompensation. There is a time limit to file a claim for compensation. How can I learn more? · Ask your healthcare provider. He or she can give you the vaccine package insert or suggest other sources of information. · Call your local or state health department. · Contact the Centers for Disease Control and Prevention (CDC): 
¨ Call 1-276.701.2040 (1-800-CDC-INFO) or ¨ Visit CDC's website at www.cdc.gov/flu Vaccine Information Statement Inactivated Influenza Vaccine 8/7/2015) [address] [id number] [facility] and [employer] Centers for Disease Control and Prevention Many Vaccine Information Statements are available in [place] and other languages. See www.immunize.org/vis. Muchas hojas de información sobre vacunas están disponibles en español y en otros idiomas. Visite www.immunize.org/vis. Care instructions adapted under license by [employer] (which disclaims liability or warranty for this information). If you have questions about a medical condition or this instruction, always ask your healthcare professional. [place] [address] any warranty or liability for your use of this information. Advance Directives: Care Instructions Your Care Instructions An advance directive is a legal way to state your wishes at the end of your life. It tells your family and your doctor what to do if you can no longer say what you want. There are two main types of advance directives. You can change them any time that your wishes change. · A living will tells your family and your doctor your wishes about life support and other treatment. · A durable power of  for health care lets you name a person to make treatment decisions for you when you can't speak for yourself. This person is called a health care agent. If you do not have an advance directive, decisions about your medical care may be made by a doctor or a  who doesn't know you. It may help to think of an advance directive as a gift to the people who care for you. If you have one, they won't have to make tough decisions by themselves. Follow-up care is a key part of your treatment and safety. Be sure to make and go to all appointments, and call your doctor if you are having problems. It's also a good idea to know your test results and keep a list of the medicines you take. How can you care for yourself at home? · Discuss your wishes with your loved ones and your doctor. This way, there are no surprises. · Many states have a unique form. Or you might use a universal form that has been approved by many states. This kind of form can sometimes be completed and stored online. Your electronic copy will then be available wherever you have a connection to the Internet. In most cases, doctors will respect your wishes even if you have a form from a different state. · You don't need a  to do an advance directive. But you may want to get legal advice. · Think about these questions when you prepare an advance directive: ¨ Who do you want to make decisions about your medical care if you are not able to? Many people choose a family member or close friend. ¨ Do you know enough about life support methods that might be used? If not, talk to your doctor so you understand. ¨ What are you most afraid of that might happen? You might be afraid of having pain, losing your independence, or being kept alive by machines. ¨ Where would you prefer to die? Choices include your home, a hospital, or a nursing home. ¨ Would you like to have information about hospice care to support you and your family? ¨ Do you want to donate organs when you die? ¨ Do you want certain Religion practices performed before you die? If so, put your wishes in the advance directive. · Read your advance directive every year, and make changes as needed. When should you call for help? Be sure to contact your doctor if you have any questions. Where can you learn more? Go to http://christie-brigid.info/. Enter R264 in the search box to learn more about \"Advance Directives: Care Instructions. \" Current as of: April 19, 2018 Content Version: 11.8 © 3917-7803 [employer]. Care instructions adapted under license by [employer] (which disclaims liability or warranty for this information). If you have questions about a medical condition or this instruction, always ask your healthcare professional. [place] [address] any warranty or liability for your use of this information. Body Mass Index: Care Instructions Your Care Instructions Body mass index (BMI) can help you see if your weight is raising your risk for health problems. It uses a formula to compare how much you weigh with how tall you are. · A BMI lower than 18.5 is considered underweight. · A BMI between 18.5 and 24.9 is considered healthy. · A BMI between 25 and 29.9 is considered overweight. A BMI of 30 or higher is considered obese. If your BMI is in the normal range, it means that you have a lower risk for weight-related health problems. If your BMI is in the overweight or obese range, you may be at increased risk for weight-related health problems, such as high blood pressure, heart disease, stroke, arthritis or joint pain, and diabetes.  If your BMI is in the underweight range, you may be at increased risk for health problems such as fatigue, lower protection (immunity) against illness, muscle loss, bone loss, hair loss, and hormone problems. BMI is just one measure of your risk for weight-related health problems. You may be at higher risk for health problems if you are not active, you eat an unhealthy diet, or you drink too much alcohol or use tobacco products. Follow-up care is a key part of your treatment and safety. Be sure to make and go to all appointments, and call your doctor if you are having problems. It's also a good idea to know your test results and keep a list of the medicines you take. How can you care for yourself at home? · Practice healthy eating habits. This includes eating plenty of fruits, vegetables, whole grains, lean protein, and low-fat dairy. · If your doctor recommends it, get more exercise. Walking is a good choice. Bit by bit, increase the amount you walk every day. Try for at least 30 minutes on most days of the week. · Do not smoke. Smoking can increase your risk for health problems. If you need help quitting, talk to your doctor about stop-smoking programs and medicines. These can increase your chances of quitting for good. · Limit alcohol to 2 drinks a day for men and 1 drink a day for women. Too much alcohol can cause health problems. If you have a BMI higher than 25 · Your doctor may do other tests to check your risk for weight-related health problems. This may include measuring the distance around your waist. A waist measurement of more than 40 inches in men or 35 inches in women can increase the risk of weight-related health problems. · Talk with your doctor about steps you can take to stay healthy or improve your health. You may need to make lifestyle changes to lose weight and stay healthy, such as changing your diet and getting regular exercise. If you have a BMI lower than 18.5 · Your doctor may do other tests to check your risk for health problems. · Talk with your doctor about steps you can take to stay healthy or improve your health. You may need to make lifestyle changes to gain or maintain weight and stay healthy, such as getting more healthy foods in your diet and doing exercises to build muscle. Where can you learn more? Go to http://christie-brigid.info/. Enter S176 in the search box to learn more about \"Body Mass Index: Care Instructions. \" Current as of: October 13, 2016 Content Version: 11.4 © 5543-9674 [employer]. Care instructions adapted under license by [employer] (which disclaims liability or warranty for this information). If you have questions about a medical condition or this instruction, always ask your healthcare professional. [address] any warranty or liability for your use of this information.

## 2018-10-05 NOTE — PROGRESS NOTES
Subjective: Chief Complaint The patient presents for follow up of diabetes, hypertension and high cholesterol. JOE Carpenter is a 66 y.o. male seen for follow up of diabetes. Cheo has hypertension and hyperlipidemia. Diabetes well controlled, no significant medication side effects noted, pt off metformin, hypertension  well controlled, no significant medication side effects noted, on Norvasc, Toprol and Avapro,  hyperlipidemia well controlled, no significant medication side effects noted, on Crestor 5 mg Diet and Lifestyle: not attempting to follow a low fat, low cholesterol diet, does not rigorously follow a diabetic diet, sedentary Home BP Monitoring: is not measured at home. Diabetic Review of Systems - home glucose monitoring: is performed sporadically, should be 1x/day. Other symptoms and concerns: pt's Dementia continues to progress even  on Namenda and Aricept. His behavioral problems  and difficulty with insomnia have improved on Seroquel. Anxiety remains stable on Xanax, Lexapro and Seroquel. Pt's wife is currently coping with keeping him at home. Pt has chronic leg pain and back pain due to PN and spinal stenosis respectively. Pain is controlled on ultram. Have not yet done pain contract due to dementia. Pt's wife gives him his medications. Current Outpatient Prescriptions Medication Sig  QUEtiapine (SEROQUEL) 50 mg tablet TAKE 1 TABLET BY MOUTH ONCE DAILY AT BEDTIME  ALPRAZolam (XANAX) 0.25 mg tablet TAKE 1 TABLET BY MOUTH 3 TIMES DAILY AS NEEDED FOR ANXIETY. MAX DAILY AMOUNT OF 3 TABS/DAY  amLODIPine (NORVASC) 10 mg tablet TAKE 1 TAB BY MOUTH DAILY FOR HYPERTENSION  metoprolol succinate (TOPROL-XL) 50 mg XL tablet TAKE 1 TABLET BY MOUTH ONCE DAILY  rosuvastatin (CRESTOR) 5 mg tablet TAKE 1 TABLET BY MOUTH AT BEDTIME IF NEEDED  
 irbesartan (AVAPRO) 150 mg tablet Take 1 Tab by mouth nightly.  escitalopram oxalate (LEXAPRO) 10 mg tablet TAKE 1 TABLET BY MOUTH ONCE DAILY  pantoprazole (PROTONIX) 40 mg tablet TAKE 1 TAB BY MOUTH DAILY.  memantine (NAMENDA) 10 mg tablet TAKE 1 TABLET BY MOUTH TWICE DAILY  traMADol (ULTRAM) 50 mg tablet TAKE 2 TABLETS BY MOUTH EVERY 6 HOURS IF NEEDED(DO NOT TAKE MORE THAN 8 TABLETS A DAY)  clotrimazole-betamethasone (LOTRISONE) topical cream Apply 2 g to affected area two (2) times a day. Indications: appply to affected skin bid and prn, apply pea size amount  levETIRAcetam (KEPPRA) 250 mg tablet Take 1 Tab by mouth two (2) times a day.  cholecalciferol (VITAMIN D3) 1,000 unit tablet Take 1,000 Units by mouth daily.  donepezil (ARICEPT) 23 mg film coated tablet  aspirin 81 mg chewable tablet Take 1 Tab by mouth daily.  OTHER Check CBC, CMP, Mg in 5 days, results to PCP immediately, Diagnosis- HTN  
 multivitamin, tx-iron-ca-min (THERA-M W/ IRON) 9 mg iron-400 mcg tab tablet Take 1 Tab by mouth daily.  OTHER Compound Cream 
Gabapentin 6%, Baclofen 2%, Cyclobenzaprine 2%, Lidocaine 2%, Flurbiprofen 10% with Ketamine 10% Apply 1-2 grams (1-2 pumps) to affected area 3-4 times 120grams/2 refills No current facility-administered medications for this visit. Review of Systems Respiratory: negative for dyspnea on exertion Cardiovascular: negative for chest pain Objective:  
 
Visit Vitals  /68 (BP 1 Location: Left arm, BP Patient Position: Sitting)  Pulse 71  Temp 97.9 °F (36.6 °C) (Oral)  Resp 20  
 Ht 5' 10\" (1.778 m)  Wt 247 lb 12.8 oz (112.4 kg)  SpO2 97%  BMI 35.56 kg/m2 General appearance - alert, well appearing, and in no distress Neck - supple, no significant adenopathy, carotids upstroke normal bilaterally, no bruits Chest - clear to auscultation, no wheezes, rales or rhonchi, symmetric air entry Heart - normal rate, regular rhythm, normal S1, S2, no murmurs, rubs, clicks or gallops Neurological - paresthesias in feet Extremities - peripheral pulses normal, no pedal edema, no clubbing or cyanosis Skin - normal coloration and turgor, no rashes, no suspicious skin lesions noted Labs:  
Lab Results Component Value Date/Time Hemoglobin A1c 6.4 (H) 09/28/2018 09:37 AM  
 Hemoglobin A1c 6.2 (H) 05/01/2018 09:07 AM  
 Hemoglobin A1c 5.9 (H) 12/29/2017 08:01 AM  
 Glucose 99 09/28/2018 09:37 AM  
 Glucose (POC) 122 (H) 04/01/2016 11:16 AM  
 Microalbumin/Creat ratio (mg/g creat) 6 09/28/2018 09:37 AM  
 Microalbumin,urine random 1.60 09/28/2018 09:37 AM  
 LDL, calculated 58.2 09/28/2018 09:37 AM  
 Creatinine 1.24 09/28/2018 09:37 AM  
  
Lab Results Component Value Date/Time Cholesterol, total 123 09/28/2018 09:37 AM  
 HDL Cholesterol 37 (L) 09/28/2018 09:37 AM  
 LDL, calculated 58.2 09/28/2018 09:37 AM  
 Triglyceride 139 09/28/2018 09:37 AM  
 CHOL/HDL Ratio 3.3 09/28/2018 09:37 AM  
 
Lab Results Component Value Date/Time ALT (SGPT) 25 09/28/2018 09:37 AM  
 AST (SGOT) 29 09/28/2018 09:37 AM  
 Alk. phosphatase 108 09/28/2018 09:37 AM  
 Bilirubin, total 1.5 (H) 09/28/2018 09:37 AM  
 
Lab Results Component Value Date/Time GFR est AA >60 09/28/2018 09:37 AM  
 GFR est non-AA 56 (L) 09/28/2018 09:37 AM  
 Creatinine 1.24 09/28/2018 09:37 AM  
 BUN 16 09/28/2018 09:37 AM  
 Sodium 145 09/28/2018 09:37 AM  
 Potassium 3.9 09/28/2018 09:37 AM  
 Chloride 106 09/28/2018 09:37 AM  
 CO2 30 09/28/2018 09:37 AM  
  
 
 
 
Assessment / Plan Diabetes well controlled, with diet Hypertension controlled on current meds Hyperlipidemia well controlled, on crestor. ICD-10-CM ICD-9-CM 1. Type 2 diabetes with nephropathy (HCC) E11.21 250.40 HEMOGLOBIN A1C W/O EAG  
  583.81   
2. HTN (hypertension), benign R06 702.9 METABOLIC PANEL, COMPREHENSIVE 3.  Dyslipidemia E78.5 272.4 LIPID PANEL  
 4. Late onset Alzheimer's disease with behavioral disturbance G30.1 331.0 Continues to progress on Aricept and Namenda F02.81 294.11   
5. Anxiety F41.9 300.00 Well controlled on Xanax and Seroquel 6. Encounter for immunization Z23 V03.89 INFLUENZA VACCINE INACTIVATED (IIV), SUBUNIT, ADJUVANTED, IM  
   ADMIN INFLUENZA VIRUS VAC 7. Chronic midline low back pain without sciatica M54.5 724.2 Controlled on Ultram   
 G89.29 338.29 Diabetic issues reviewed with him: diabetic diet discussed in detail, and low cholesterol diet, weight control and daily exercise discussed. Follow-up Disposition: 
Return in about 4 months (around 2/5/2019) for labs 1 week before. Reviewed plan of care. Patient has provided input and agrees with goals. Discussed the patient's BMI with him. The BMI follow up plan is as follows:  
 
dietary management education, guidance, and counseling 
encourage exercise 
monitor weight 
prescribed dietary intake An After Visit Summary was printed and given to the patient.

## 2018-10-05 NOTE — PROGRESS NOTES
Patient is in the office today for a 4 month follow up. 1. Have you been to the ER, urgent care clinic since your last visit? Hospitalized since your last visit? No 
 
2. Have you seen or consulted any other health care providers outside of the [address] [place] [address] since your last visit? Include any pap smears or colon screening. No  
 
Verbal order read back per Dr. Jason Perez flu vaccine. Patient received flu vaccine in left deltoid. Patient was observed for 15 minutes and no signs or symptoms of allergic reaction noted at this time. Patient tolerated well and left without complaints. Patient received flu VIS.

## 2019-01-01 ENCOUNTER — APPOINTMENT (OUTPATIENT)
Dept: CT IMAGING | Age: 79
End: 2019-01-01
Attending: EMERGENCY MEDICINE
Payer: MEDICARE

## 2019-01-01 ENCOUNTER — HOSPITAL ENCOUNTER (EMERGENCY)
Age: 79
Discharge: HOME | End: 2019-12-31
Attending: EMERGENCY MEDICINE
Payer: MEDICARE

## 2019-01-01 ENCOUNTER — TELEPHONE (OUTPATIENT)
Dept: FAMILY MEDICINE CLINIC | Age: 79
End: 2019-01-01

## 2019-01-01 ENCOUNTER — HOSPITAL ENCOUNTER (OUTPATIENT)
Dept: LAB | Age: 79
Discharge: HOME | End: 2019-02-02
Payer: MEDICARE

## 2019-01-01 ENCOUNTER — HOSPITAL ENCOUNTER (EMERGENCY)
Age: 79
Discharge: HOME | End: 2019-10-22
Attending: EMERGENCY MEDICINE
Payer: MEDICARE

## 2019-01-01 ENCOUNTER — HOSPITAL ENCOUNTER (EMERGENCY)
Age: 79
Discharge: HOME | End: 2019-12-18
Attending: EMERGENCY MEDICINE
Payer: MEDICARE

## 2019-01-01 ENCOUNTER — HOSPITAL ENCOUNTER (OUTPATIENT)
Dept: LAB | Age: 79
Discharge: HOME | End: 2019-05-10
Payer: MEDICARE

## 2019-01-01 ENCOUNTER — HOSPITAL ENCOUNTER (OUTPATIENT)
Dept: LAB | Age: 79
Discharge: HOME | End: 2019-09-21
Payer: MEDICARE

## 2019-01-01 ENCOUNTER — OFFICE VISIT (OUTPATIENT)
Dept: FAMILY MEDICINE CLINIC | Age: 79
End: 2019-01-01

## 2019-01-01 ENCOUNTER — APPOINTMENT (OUTPATIENT)
Dept: GENERAL RADIOLOGY | Age: 79
End: 2019-01-01
Attending: EMERGENCY MEDICINE
Payer: MEDICARE

## 2019-01-01 VITALS
OXYGEN SATURATION: 94 % | TEMPERATURE: 98 F | BODY MASS INDEX: 37.51 KG/M2 | SYSTOLIC BLOOD PRESSURE: 130 MMHG | HEART RATE: 101 BPM | HEIGHT: 70 IN | WEIGHT: 262 LBS | DIASTOLIC BLOOD PRESSURE: 71 MMHG | RESPIRATION RATE: 20 BRPM

## 2019-01-01 VITALS
SYSTOLIC BLOOD PRESSURE: 127 MMHG | HEIGHT: 70 IN | TEMPERATURE: 96.7 F | HEART RATE: 69 BPM | WEIGHT: 252 LBS | DIASTOLIC BLOOD PRESSURE: 72 MMHG | BODY MASS INDEX: 36.08 KG/M2 | OXYGEN SATURATION: 97 % | RESPIRATION RATE: 20 BRPM

## 2019-01-01 VITALS
HEIGHT: 69 IN | SYSTOLIC BLOOD PRESSURE: 140 MMHG | BODY MASS INDEX: 37.92 KG/M2 | OXYGEN SATURATION: 96 % | TEMPERATURE: 97.9 F | WEIGHT: 256 LBS | RESPIRATION RATE: 14 BRPM | HEART RATE: 62 BPM | DIASTOLIC BLOOD PRESSURE: 70 MMHG

## 2019-01-01 VITALS
RESPIRATION RATE: 18 BRPM | DIASTOLIC BLOOD PRESSURE: 67 MMHG | TEMPERATURE: 98.9 F | SYSTOLIC BLOOD PRESSURE: 125 MMHG | WEIGHT: 259 LBS | HEART RATE: 88 BPM | HEIGHT: 69 IN | OXYGEN SATURATION: 95 % | BODY MASS INDEX: 38.36 KG/M2

## 2019-01-01 VITALS
BODY MASS INDEX: 37.33 KG/M2 | OXYGEN SATURATION: 96 % | SYSTOLIC BLOOD PRESSURE: 133 MMHG | WEIGHT: 252 LBS | HEIGHT: 69 IN | HEART RATE: 63 BPM | RESPIRATION RATE: 11 BRPM | DIASTOLIC BLOOD PRESSURE: 62 MMHG | TEMPERATURE: 97.6 F

## 2019-01-01 VITALS
DIASTOLIC BLOOD PRESSURE: 79 MMHG | SYSTOLIC BLOOD PRESSURE: 158 MMHG | TEMPERATURE: 97.8 F | HEART RATE: 70 BPM | OXYGEN SATURATION: 96 % | RESPIRATION RATE: 16 BRPM

## 2019-01-01 VITALS
HEIGHT: 70 IN | HEART RATE: 65 BPM | SYSTOLIC BLOOD PRESSURE: 130 MMHG | BODY MASS INDEX: 37.8 KG/M2 | WEIGHT: 264 LBS | RESPIRATION RATE: 20 BRPM | OXYGEN SATURATION: 97 % | DIASTOLIC BLOOD PRESSURE: 80 MMHG | TEMPERATURE: 97.8 F

## 2019-01-01 DIAGNOSIS — F41.9 ANXIETY: ICD-10-CM

## 2019-01-01 DIAGNOSIS — I10 HTN (HYPERTENSION), BENIGN: ICD-10-CM

## 2019-01-01 DIAGNOSIS — E87.6 HYPOKALEMIA: ICD-10-CM

## 2019-01-01 DIAGNOSIS — E78.5 DYSLIPIDEMIA: ICD-10-CM

## 2019-01-01 DIAGNOSIS — R13.12 OROPHARYNGEAL DYSPHAGIA: Primary | ICD-10-CM

## 2019-01-01 DIAGNOSIS — Z00.00 MEDICARE ANNUAL WELLNESS VISIT, SUBSEQUENT: Primary | ICD-10-CM

## 2019-01-01 DIAGNOSIS — G30.1 LATE ONSET ALZHEIMER'S DISEASE WITH BEHAVIORAL DISTURBANCE (HCC): ICD-10-CM

## 2019-01-01 DIAGNOSIS — F02.818 LATE ONSET ALZHEIMER'S DISEASE WITH BEHAVIORAL DISTURBANCE (HCC): ICD-10-CM

## 2019-01-01 DIAGNOSIS — E11.21 TYPE 2 DIABETES WITH NEPHROPATHY (HCC): ICD-10-CM

## 2019-01-01 DIAGNOSIS — F02.818 DEMENTIA ASSOCIATED WITH OTHER UNDERLYING DISEASE WITH BEHAVIORAL DISTURBANCE: ICD-10-CM

## 2019-01-01 DIAGNOSIS — R07.89 ATYPICAL CHEST PAIN: ICD-10-CM

## 2019-01-01 DIAGNOSIS — G89.29 CHRONIC MIDLINE LOW BACK PAIN WITHOUT SCIATICA: ICD-10-CM

## 2019-01-01 DIAGNOSIS — K04.7 DENTAL ABSCESS: ICD-10-CM

## 2019-01-01 DIAGNOSIS — M54.50 CHRONIC MIDLINE LOW BACK PAIN WITHOUT SCIATICA: ICD-10-CM

## 2019-01-01 DIAGNOSIS — R06.89 GRUNTING RESPIRATION: Primary | ICD-10-CM

## 2019-01-01 DIAGNOSIS — G30.1 LATE ONSET ALZHEIMER'S DISEASE WITH BEHAVIORAL DISTURBANCE (HCC): Primary | ICD-10-CM

## 2019-01-01 DIAGNOSIS — Z23 ENCOUNTER FOR IMMUNIZATION: ICD-10-CM

## 2019-01-01 DIAGNOSIS — F02.818 LATE ONSET ALZHEIMER'S DISEASE WITH BEHAVIORAL DISTURBANCE (HCC): Primary | ICD-10-CM

## 2019-01-01 DIAGNOSIS — E11.21 TYPE 2 DIABETES WITH NEPHROPATHY (HCC): Primary | ICD-10-CM

## 2019-01-01 DIAGNOSIS — I10 HTN (HYPERTENSION), BENIGN: Primary | ICD-10-CM

## 2019-01-01 DIAGNOSIS — F03.91 DEMENTIA WITH BEHAVIORAL DISTURBANCE, UNSPECIFIED DEMENTIA TYPE: Primary | ICD-10-CM

## 2019-01-01 DIAGNOSIS — R10.13 EPIGASTRIC PAIN: Primary | ICD-10-CM

## 2019-01-01 LAB
ALBUMIN SERPL-MCNC: 3.7 G/DL (ref 3.4–5)
ALBUMIN SERPL-MCNC: 3.7 G/DL (ref 3.4–5)
ALBUMIN SERPL-MCNC: 3.9 G/DL (ref 3.4–5)
ALBUMIN SERPL-MCNC: 4 G/DL (ref 3.4–5)
ALBUMIN SERPL-MCNC: 4 G/DL (ref 3.4–5)
ALBUMIN/GLOB SERPL: 0.9 {RATIO} (ref 0.8–1.7)
ALBUMIN/GLOB SERPL: 1.1 {RATIO} (ref 0.8–1.7)
ALP SERPL-CCNC: 111 U/L (ref 45–117)
ALP SERPL-CCNC: 118 U/L (ref 45–117)
ALP SERPL-CCNC: 119 U/L (ref 45–117)
ALP SERPL-CCNC: 120 U/L (ref 45–117)
ALP SERPL-CCNC: 95 U/L (ref 45–117)
ALT SERPL-CCNC: 20 U/L (ref 16–61)
ALT SERPL-CCNC: 20 U/L (ref 16–61)
ALT SERPL-CCNC: 24 U/L (ref 16–61)
ALT SERPL-CCNC: 26 U/L (ref 16–61)
ALT SERPL-CCNC: 35 U/L (ref 16–61)
ANION GAP SERPL CALC-SCNC: 4 MMOL/L (ref 3–18)
ANION GAP SERPL CALC-SCNC: 5 MMOL/L (ref 3–18)
ANION GAP SERPL CALC-SCNC: 6 MMOL/L (ref 3–18)
ANION GAP SERPL CALC-SCNC: 6 MMOL/L (ref 3–18)
ANION GAP SERPL CALC-SCNC: 7 MMOL/L (ref 3–18)
ANION GAP SERPL CALC-SCNC: 7 MMOL/L (ref 3–18)
APPEARANCE UR: CLEAR
APTT PPP: 27.1 SEC (ref 23–36.4)
AST SERPL-CCNC: 22 U/L (ref 15–37)
AST SERPL-CCNC: 25 U/L (ref 15–37)
AST SERPL-CCNC: 26 U/L (ref 10–38)
AST SERPL-CCNC: 27 U/L (ref 10–38)
AST SERPL-CCNC: 44 U/L (ref 10–38)
ATRIAL RATE: 69 BPM
ATRIAL RATE: 79 BPM
BASOPHILS # BLD: 0 K/UL (ref 0–0.1)
BASOPHILS NFR BLD: 0 % (ref 0–2)
BILIRUB SERPL-MCNC: 0.4 MG/DL (ref 0.2–1)
BILIRUB SERPL-MCNC: 0.7 MG/DL (ref 0.2–1)
BILIRUB SERPL-MCNC: 0.8 MG/DL (ref 0.2–1)
BILIRUB SERPL-MCNC: 0.8 MG/DL (ref 0.2–1)
BILIRUB SERPL-MCNC: 1 MG/DL (ref 0.2–1)
BILIRUB UR QL: NEGATIVE
BUN SERPL-MCNC: 13 MG/DL (ref 7–18)
BUN SERPL-MCNC: 14 MG/DL (ref 7–18)
BUN SERPL-MCNC: 17 MG/DL (ref 7–18)
BUN SERPL-MCNC: 9 MG/DL (ref 7–18)
BUN/CREAT SERPL: 10 (ref 12–20)
BUN/CREAT SERPL: 12 (ref 12–20)
BUN/CREAT SERPL: 14 (ref 12–20)
BUN/CREAT SERPL: 6 (ref 12–20)
BUN/CREAT SERPL: 9 (ref 12–20)
BUN/CREAT SERPL: 9 (ref 12–20)
CALCIUM SERPL-MCNC: 8.3 MG/DL (ref 8.5–10.1)
CALCIUM SERPL-MCNC: 8.5 MG/DL (ref 8.5–10.1)
CALCIUM SERPL-MCNC: 8.6 MG/DL (ref 8.5–10.1)
CALCIUM SERPL-MCNC: 8.8 MG/DL (ref 8.5–10.1)
CALCIUM SERPL-MCNC: 8.9 MG/DL (ref 8.5–10.1)
CALCIUM SERPL-MCNC: 9 MG/DL (ref 8.5–10.1)
CALCULATED P AXIS, ECG09: -3 DEGREES
CALCULATED P AXIS, ECG09: 19 DEGREES
CALCULATED R AXIS, ECG10: 18 DEGREES
CALCULATED R AXIS, ECG10: 19 DEGREES
CALCULATED T AXIS, ECG11: -13 DEGREES
CALCULATED T AXIS, ECG11: 21 DEGREES
CHLORIDE SERPL-SCNC: 103 MMOL/L (ref 100–108)
CHLORIDE SERPL-SCNC: 103 MMOL/L (ref 100–111)
CHLORIDE SERPL-SCNC: 105 MMOL/L (ref 100–111)
CHLORIDE SERPL-SCNC: 106 MMOL/L (ref 100–108)
CHLORIDE SERPL-SCNC: 107 MMOL/L (ref 100–111)
CHLORIDE SERPL-SCNC: 109 MMOL/L (ref 100–111)
CHOLEST SERPL-MCNC: 106 MG/DL
CHOLEST SERPL-MCNC: 108 MG/DL
CHOLEST SERPL-MCNC: 124 MG/DL
CK MB CFR SERPL CALC: 1 % (ref 0–4)
CK MB CFR SERPL CALC: 1.1 % (ref 0–4)
CK MB SERPL-MCNC: 7.5 NG/ML (ref 5–25)
CK MB SERPL-MCNC: 8.7 NG/ML (ref 5–25)
CK SERPL-CCNC: 725 U/L (ref 39–308)
CK SERPL-CCNC: 817 U/L (ref 39–308)
CO2 SERPL-SCNC: 29 MMOL/L (ref 21–32)
CO2 SERPL-SCNC: 31 MMOL/L (ref 21–32)
CO2 SERPL-SCNC: 31 MMOL/L (ref 21–32)
CO2 SERPL-SCNC: 32 MMOL/L (ref 21–32)
CO2 SERPL-SCNC: 32 MMOL/L (ref 21–32)
CO2 SERPL-SCNC: 34 MMOL/L (ref 21–32)
COLOR UR: YELLOW
CREAT SERPL-MCNC: 1.2 MG/DL (ref 0.6–1.3)
CREAT SERPL-MCNC: 1.21 MG/DL (ref 0.6–1.3)
CREAT SERPL-MCNC: 1.28 MG/DL (ref 0.6–1.3)
CREAT SERPL-MCNC: 1.38 MG/DL (ref 0.6–1.3)
CREAT SERPL-MCNC: 1.39 MG/DL (ref 0.6–1.3)
CREAT SERPL-MCNC: 1.48 MG/DL (ref 0.6–1.3)
CREAT UR-MCNC: 216 MG/DL (ref 30–125)
DIAGNOSIS, 93000: NORMAL
DIAGNOSIS, 93000: NORMAL
DIFFERENTIAL METHOD BLD: ABNORMAL
EOSINOPHIL # BLD: 0.1 K/UL (ref 0–0.4)
EOSINOPHIL # BLD: 0.1 K/UL (ref 0–0.4)
EOSINOPHIL # BLD: 0.2 K/UL (ref 0–0.4)
EOSINOPHIL NFR BLD: 1 % (ref 0–5)
EOSINOPHIL NFR BLD: 2 % (ref 0–5)
EOSINOPHIL NFR BLD: 2 % (ref 0–5)
ERYTHROCYTE [DISTWIDTH] IN BLOOD BY AUTOMATED COUNT: 13.8 % (ref 11.6–14.5)
ERYTHROCYTE [DISTWIDTH] IN BLOOD BY AUTOMATED COUNT: 13.8 % (ref 11.6–14.5)
ERYTHROCYTE [DISTWIDTH] IN BLOOD BY AUTOMATED COUNT: 13.9 % (ref 11.6–14.5)
GLOBULIN SER CALC-MCNC: 3.5 G/DL (ref 2–4)
GLOBULIN SER CALC-MCNC: 3.5 G/DL (ref 2–4)
GLOBULIN SER CALC-MCNC: 3.6 G/DL (ref 2–4)
GLOBULIN SER CALC-MCNC: 3.8 G/DL (ref 2–4)
GLOBULIN SER CALC-MCNC: 4 G/DL (ref 2–4)
GLUCOSE SERPL-MCNC: 105 MG/DL (ref 74–99)
GLUCOSE SERPL-MCNC: 112 MG/DL (ref 74–99)
GLUCOSE SERPL-MCNC: 117 MG/DL (ref 74–99)
GLUCOSE SERPL-MCNC: 128 MG/DL (ref 74–99)
GLUCOSE SERPL-MCNC: 133 MG/DL (ref 74–99)
GLUCOSE SERPL-MCNC: 92 MG/DL (ref 74–99)
GLUCOSE UR STRIP.AUTO-MCNC: NEGATIVE MG/DL
HBA1C MFR BLD: 6.3 % (ref 4.2–5.6)
HBA1C MFR BLD: 6.4 % (ref 4.2–5.6)
HBA1C MFR BLD: 6.5 % (ref 4.2–5.6)
HCT VFR BLD AUTO: 39.3 % (ref 36–48)
HCT VFR BLD AUTO: 41.4 % (ref 36–48)
HCT VFR BLD AUTO: 42.1 % (ref 36–48)
HDLC SERPL-MCNC: 36 MG/DL (ref 40–60)
HDLC SERPL-MCNC: 39 MG/DL (ref 40–60)
HDLC SERPL-MCNC: 41 MG/DL (ref 40–60)
HDLC SERPL: 2.6 {RATIO} (ref 0–5)
HDLC SERPL: 2.8 {RATIO} (ref 0–5)
HDLC SERPL: 3.4 {RATIO} (ref 0–5)
HGB BLD-MCNC: 13.2 G/DL (ref 13–16)
HGB BLD-MCNC: 13.6 G/DL (ref 13–16)
HGB BLD-MCNC: 13.9 G/DL (ref 13–16)
HGB UR QL STRIP: NEGATIVE
INR PPP: 1.2 (ref 0.8–1.2)
KETONES UR QL STRIP.AUTO: NEGATIVE MG/DL
LDLC SERPL CALC-MCNC: 36.6 MG/DL (ref 0–100)
LDLC SERPL CALC-MCNC: 40.8 MG/DL (ref 0–100)
LDLC SERPL CALC-MCNC: 54 MG/DL (ref 0–100)
LEUKOCYTE ESTERASE UR QL STRIP.AUTO: NEGATIVE
LIPID PROFILE,FLP: ABNORMAL
LIPID PROFILE,FLP: ABNORMAL
LIPID PROFILE,FLP: NORMAL
LYMPHOCYTES # BLD: 1.4 K/UL (ref 0.9–3.6)
LYMPHOCYTES # BLD: 1.4 K/UL (ref 0.9–3.6)
LYMPHOCYTES # BLD: 1.5 K/UL (ref 0.9–3.6)
LYMPHOCYTES NFR BLD: 19 % (ref 21–52)
LYMPHOCYTES NFR BLD: 23 % (ref 21–52)
LYMPHOCYTES NFR BLD: 26 % (ref 21–52)
MCH RBC QN AUTO: 30.2 PG (ref 24–34)
MCH RBC QN AUTO: 30.3 PG (ref 24–34)
MCH RBC QN AUTO: 30.9 PG (ref 24–34)
MCHC RBC AUTO-ENTMCNC: 32.9 G/DL (ref 31–37)
MCHC RBC AUTO-ENTMCNC: 33 G/DL (ref 31–37)
MCHC RBC AUTO-ENTMCNC: 33.6 G/DL (ref 31–37)
MCV RBC AUTO: 91.7 FL (ref 74–97)
MCV RBC AUTO: 91.8 FL (ref 74–97)
MCV RBC AUTO: 92 FL (ref 74–97)
MICROALBUMIN UR-MCNC: 1.24 MG/DL (ref 0–3)
MICROALBUMIN/CREAT UR-RTO: 6 MG/G (ref 0–30)
MONOCYTES # BLD: 0.4 K/UL (ref 0.05–1.2)
MONOCYTES # BLD: 0.6 K/UL (ref 0.05–1.2)
MONOCYTES # BLD: 0.7 K/UL (ref 0.05–1.2)
MONOCYTES NFR BLD: 10 % (ref 3–10)
MONOCYTES NFR BLD: 8 % (ref 3–10)
MONOCYTES NFR BLD: 9 % (ref 3–10)
NEUTS SEG # BLD: 3.3 K/UL (ref 1.8–8)
NEUTS SEG # BLD: 4.1 K/UL (ref 1.8–8)
NEUTS SEG # BLD: 5.1 K/UL (ref 1.8–8)
NEUTS SEG NFR BLD: 64 % (ref 40–73)
NEUTS SEG NFR BLD: 65 % (ref 40–73)
NEUTS SEG NFR BLD: 71 % (ref 40–73)
NITRITE UR QL STRIP.AUTO: NEGATIVE
P-R INTERVAL, ECG05: 138 MS
P-R INTERVAL, ECG05: 178 MS
PH UR STRIP: 6.5 [PH] (ref 5–8)
PLATELET # BLD AUTO: 163 K/UL (ref 135–420)
PLATELET # BLD AUTO: 165 K/UL (ref 135–420)
PLATELET # BLD AUTO: 166 K/UL (ref 135–420)
PMV BLD AUTO: 10 FL (ref 9.2–11.8)
PMV BLD AUTO: 10.4 FL (ref 9.2–11.8)
PMV BLD AUTO: 10.9 FL (ref 9.2–11.8)
POTASSIUM SERPL-SCNC: 3.2 MMOL/L (ref 3.5–5.5)
POTASSIUM SERPL-SCNC: 3.6 MMOL/L (ref 3.5–5.5)
POTASSIUM SERPL-SCNC: 3.7 MMOL/L (ref 3.5–5.5)
POTASSIUM SERPL-SCNC: 3.8 MMOL/L (ref 3.5–5.5)
POTASSIUM SERPL-SCNC: 3.9 MMOL/L (ref 3.5–5.5)
POTASSIUM SERPL-SCNC: 3.9 MMOL/L (ref 3.5–5.5)
PROT SERPL-MCNC: 7.2 G/DL (ref 6.4–8.2)
PROT SERPL-MCNC: 7.5 G/DL (ref 6.4–8.2)
PROT SERPL-MCNC: 7.5 G/DL (ref 6.4–8.2)
PROT SERPL-MCNC: 7.7 G/DL (ref 6.4–8.2)
PROT SERPL-MCNC: 7.8 G/DL (ref 6.4–8.2)
PROT UR STRIP-MCNC: NEGATIVE MG/DL
PROTHROMBIN TIME: 15.3 SEC (ref 11.5–15.2)
Q-T INTERVAL, ECG07: 372 MS
Q-T INTERVAL, ECG07: 426 MS
QRS DURATION, ECG06: 78 MS
QRS DURATION, ECG06: 80 MS
QTC CALCULATION (BEZET), ECG08: 426 MS
QTC CALCULATION (BEZET), ECG08: 456 MS
RBC # BLD AUTO: 4.27 M/UL (ref 4.7–5.5)
RBC # BLD AUTO: 4.51 M/UL (ref 4.7–5.5)
RBC # BLD AUTO: 4.59 M/UL (ref 4.7–5.5)
SODIUM SERPL-SCNC: 140 MMOL/L (ref 136–145)
SODIUM SERPL-SCNC: 141 MMOL/L (ref 136–145)
SODIUM SERPL-SCNC: 143 MMOL/L (ref 136–145)
SODIUM SERPL-SCNC: 143 MMOL/L (ref 136–145)
SODIUM SERPL-SCNC: 145 MMOL/L (ref 136–145)
SODIUM SERPL-SCNC: 145 MMOL/L (ref 136–145)
SP GR UR REFRACTOMETRY: 1.02 (ref 1–1.03)
TRIGL SERPL-MCNC: 141 MG/DL (ref ?–150)
TRIGL SERPL-MCNC: 142 MG/DL (ref ?–150)
TRIGL SERPL-MCNC: 170 MG/DL (ref ?–150)
TROPONIN I SERPL-MCNC: <0.02 NG/ML (ref 0–0.04)
UROBILINOGEN UR QL STRIP.AUTO: 1 EU/DL (ref 0.2–1)
VENTRICULAR RATE, ECG03: 69 BPM
VENTRICULAR RATE, ECG03: 79 BPM
VLDLC SERPL CALC-MCNC: 28.2 MG/DL
VLDLC SERPL CALC-MCNC: 28.4 MG/DL
VLDLC SERPL CALC-MCNC: 34 MG/DL
WBC # BLD AUTO: 5.2 K/UL (ref 4.6–13.2)
WBC # BLD AUTO: 6.4 K/UL (ref 4.6–13.2)
WBC # BLD AUTO: 7.3 K/UL (ref 4.6–13.2)

## 2019-01-01 PROCEDURE — 83036 HEMOGLOBIN GLYCOSYLATED A1C: CPT

## 2019-01-01 PROCEDURE — 96374 THER/PROPH/DIAG INJ IV PUSH: CPT

## 2019-01-01 PROCEDURE — 82550 ASSAY OF CK (CPK): CPT

## 2019-01-01 PROCEDURE — 74011250637 HC RX REV CODE- 250/637: Performed by: EMERGENCY MEDICINE

## 2019-01-01 PROCEDURE — 93005 ELECTROCARDIOGRAM TRACING: CPT

## 2019-01-01 PROCEDURE — 84484 ASSAY OF TROPONIN QUANT: CPT

## 2019-01-01 PROCEDURE — 82043 UR ALBUMIN QUANTITATIVE: CPT

## 2019-01-01 PROCEDURE — 85025 COMPLETE CBC W/AUTO DIFF WBC: CPT

## 2019-01-01 PROCEDURE — 80061 LIPID PANEL: CPT

## 2019-01-01 PROCEDURE — 36415 COLL VENOUS BLD VENIPUNCTURE: CPT

## 2019-01-01 PROCEDURE — 80053 COMPREHEN METABOLIC PANEL: CPT

## 2019-01-01 PROCEDURE — 70450 CT HEAD/BRAIN W/O DYE: CPT

## 2019-01-01 PROCEDURE — 71250 CT THORAX DX C-: CPT

## 2019-01-01 PROCEDURE — 85610 PROTHROMBIN TIME: CPT

## 2019-01-01 PROCEDURE — 85730 THROMBOPLASTIN TIME PARTIAL: CPT

## 2019-01-01 PROCEDURE — 99285 EMERGENCY DEPT VISIT HI MDM: CPT

## 2019-01-01 PROCEDURE — 96375 TX/PRO/DX INJ NEW DRUG ADDON: CPT

## 2019-01-01 PROCEDURE — 74011250636 HC RX REV CODE- 250/636: Performed by: EMERGENCY MEDICINE

## 2019-01-01 PROCEDURE — 71045 X-RAY EXAM CHEST 1 VIEW: CPT

## 2019-01-01 PROCEDURE — 70490 CT SOFT TISSUE NECK W/O DYE: CPT

## 2019-01-01 PROCEDURE — 99283 EMERGENCY DEPT VISIT LOW MDM: CPT

## 2019-01-01 PROCEDURE — 80048 BASIC METABOLIC PNL TOTAL CA: CPT

## 2019-01-01 PROCEDURE — 81003 URINALYSIS AUTO W/O SCOPE: CPT

## 2019-01-01 RX ORDER — ROSUVASTATIN CALCIUM 5 MG/1
TABLET, COATED ORAL
Qty: 90 TAB | Refills: 1 | Status: SHIPPED | OUTPATIENT
Start: 2019-01-01 | End: 2019-01-01 | Stop reason: SDUPTHER

## 2019-01-01 RX ORDER — ROSUVASTATIN CALCIUM 5 MG/1
TABLET, COATED ORAL
Qty: 90 TAB | Refills: 1 | Status: SHIPPED | OUTPATIENT
Start: 2019-01-01

## 2019-01-01 RX ORDER — ESCITALOPRAM OXALATE 10 MG/1
TABLET ORAL
Qty: 90 TAB | Refills: 2 | Status: SHIPPED | OUTPATIENT
Start: 2019-01-01

## 2019-01-01 RX ORDER — LORAZEPAM 0.5 MG/1
TABLET ORAL
COMMUNITY

## 2019-01-01 RX ORDER — QUETIAPINE FUMARATE 50 MG/1
50 TABLET, FILM COATED ORAL 2 TIMES DAILY
Qty: 180 TAB | Refills: 3 | Status: SHIPPED | OUTPATIENT
Start: 2019-01-01 | End: 2019-01-01 | Stop reason: SDUPTHER

## 2019-01-01 RX ORDER — LORAZEPAM 2 MG/ML
0.5 INJECTION INTRAMUSCULAR
Status: COMPLETED | OUTPATIENT
Start: 2019-01-01 | End: 2019-01-01

## 2019-01-01 RX ORDER — AMOXICILLIN AND CLAVULANATE POTASSIUM 875; 125 MG/1; MG/1
1 TABLET, FILM COATED ORAL 2 TIMES DAILY
Qty: 20 TAB | Refills: 0 | Status: SHIPPED | OUTPATIENT
Start: 2019-01-01 | End: 2019-01-01

## 2019-01-01 RX ORDER — AMLODIPINE BESYLATE 10 MG/1
TABLET ORAL
Qty: 30 TAB | Refills: 5 | Status: SHIPPED | OUTPATIENT
Start: 2019-01-01

## 2019-01-01 RX ORDER — ALPRAZOLAM 0.25 MG/1
TABLET ORAL
Qty: 90 TAB | Refills: 2 | Status: SHIPPED | OUTPATIENT
Start: 2019-01-01 | End: 2019-01-01 | Stop reason: SDUPTHER

## 2019-01-01 RX ORDER — ALPRAZOLAM 1 MG/1
1-2 TABLET ORAL
Qty: 20 TAB | Refills: 0 | Status: SHIPPED | OUTPATIENT
Start: 2019-01-01

## 2019-01-01 RX ORDER — ALPRAZOLAM 0.25 MG/1
TABLET ORAL
Qty: 90 TAB | Refills: 2 | Status: SHIPPED | OUTPATIENT
Start: 2019-01-01

## 2019-01-01 RX ORDER — AMOXICILLIN AND CLAVULANATE POTASSIUM 875; 125 MG/1; MG/1
TABLET, FILM COATED ORAL EVERY 12 HOURS
COMMUNITY

## 2019-01-01 RX ORDER — LORAZEPAM 0.5 MG/1
0.5-1 TABLET ORAL
Qty: 20 TAB | Refills: 0 | Status: SHIPPED | OUTPATIENT
Start: 2019-01-01 | End: 2019-01-01 | Stop reason: SDUPTHER

## 2019-01-01 RX ORDER — FAMOTIDINE 10 MG/ML
20 INJECTION INTRAVENOUS
Status: COMPLETED | OUTPATIENT
Start: 2019-01-01 | End: 2019-01-01

## 2019-01-01 RX ORDER — PANTOPRAZOLE SODIUM 40 MG/1
TABLET, DELAYED RELEASE ORAL
Qty: 90 TAB | Refills: 2 | Status: SHIPPED | OUTPATIENT
Start: 2019-01-01

## 2019-01-01 RX ORDER — LORAZEPAM 2 MG/ML
1 INJECTION INTRAMUSCULAR
Status: DISCONTINUED | OUTPATIENT
Start: 2019-01-01 | End: 2019-01-01

## 2019-01-01 RX ORDER — IRBESARTAN 150 MG/1
TABLET ORAL
Qty: 90 TAB | Refills: 3 | Status: SHIPPED | OUTPATIENT
Start: 2019-01-01

## 2019-01-01 RX ORDER — ALPRAZOLAM 0.25 MG/1
TABLET ORAL
Qty: 90 TAB | Refills: 2 | Status: SHIPPED | OUTPATIENT
Start: 2019-01-01 | End: 2019-01-01

## 2019-01-01 RX ORDER — CICLOPIROX OLAMINE 7.7 MG/G
CREAM TOPICAL
Qty: 90 G | Refills: 2 | Status: SHIPPED | OUTPATIENT
Start: 2019-01-01

## 2019-01-01 RX ORDER — ALPRAZOLAM 0.25 MG/1
TABLET ORAL
Qty: 90 TAB | Refills: 2 | Status: CANCELLED | OUTPATIENT
Start: 2019-01-01

## 2019-01-01 RX ORDER — CLOTRIMAZOLE AND BETAMETHASONE DIPROPIONATE 10; .64 MG/G; MG/G
2 CREAM TOPICAL 2 TIMES DAILY
Qty: 30 G | Refills: 2 | Status: SHIPPED | OUTPATIENT
Start: 2019-01-01

## 2019-01-01 RX ORDER — ALPRAZOLAM 0.25 MG/1
TABLET ORAL
Qty: 90 TAB | Refills: 2 | OUTPATIENT
Start: 2019-01-01

## 2019-01-01 RX ORDER — MEMANTINE HYDROCHLORIDE 10 MG/1
TABLET ORAL
Qty: 180 TAB | Refills: 2 | Status: SHIPPED | OUTPATIENT
Start: 2019-01-01

## 2019-01-01 RX ORDER — QUETIAPINE FUMARATE 50 MG/1
TABLET, FILM COATED ORAL
Qty: 90 TAB | Refills: 2 | Status: SHIPPED | OUTPATIENT
Start: 2019-01-01

## 2019-01-01 RX ORDER — ALPRAZOLAM 0.25 MG/1
2 TABLET ORAL
Status: COMPLETED | OUTPATIENT
Start: 2019-01-01 | End: 2019-01-01

## 2019-01-01 RX ORDER — PANTOPRAZOLE SODIUM 40 MG/1
TABLET, DELAYED RELEASE ORAL
Qty: 90 TAB | Refills: 2 | Status: SHIPPED | OUTPATIENT
Start: 2019-01-01 | End: 2019-01-01

## 2019-01-01 RX ADMIN — LORAZEPAM 0.5 MG: 2 INJECTION INTRAMUSCULAR; INTRAVENOUS at 11:05

## 2019-01-01 RX ADMIN — SODIUM CHLORIDE 1000 ML: 900 INJECTION, SOLUTION INTRAVENOUS at 11:05

## 2019-01-01 RX ADMIN — FAMOTIDINE 20 MG: 10 INJECTION INTRAVENOUS at 11:05

## 2019-01-01 RX ADMIN — ALPRAZOLAM 2 MG: 0.25 TABLET ORAL at 20:40

## 2019-01-01 RX ADMIN — POTASSIUM BICARBONATE 20 MEQ: 782 TABLET, EFFERVESCENT ORAL at 16:13

## 2019-01-02 DIAGNOSIS — F41.9 ANXIETY: ICD-10-CM

## 2019-01-02 NOTE — TELEPHONE ENCOUNTER
Requested Prescriptions     Pending Prescriptions Disp Refills    ALPRAZolam (XANAX) 0.25 mg tablet 90 Tab 2     Pt is out of medication

## 2019-01-03 ENCOUNTER — TELEPHONE (OUTPATIENT)
Dept: FAMILY MEDICINE CLINIC | Age: 79
End: 2019-01-03

## 2019-01-03 RX ORDER — ALPRAZOLAM 0.25 MG/1
TABLET ORAL
Qty: 90 TAB | Refills: 2 | Status: SHIPPED | OUTPATIENT
Start: 2019-01-03 | End: 2019-01-01 | Stop reason: SDUPTHER

## 2019-02-08 NOTE — PROGRESS NOTES
Patient is in the office today for 4 month follow up. 1. Have you been to the ER, urgent care clinic since your last visit? Hospitalized since your last visit? No    2. Have you seen or consulted any other health care providers outside of the [address] [place] [address] since your last visit? Include any pap smears or colon screening.  No

## 2019-02-08 NOTE — PATIENT INSTRUCTIONS

## 2019-02-10 NOTE — PROGRESS NOTES
Subjective:       Chief Complaint  The patient presents for follow up of diabetes, hypertension and high cholesterol. JOE Parra Sr. is a 66 y.o. male seen for follow up of diabetes. Healkinsey has hypertension and hyperlipidemia. Diabetes well controlled, no significant medication side effects noted, pt off metformin, hypertension  well controlled, no significant medication side effects noted, on Norvasc, Toprol and Avapro,  hyperlipidemia well controlled, no significant medication side effects noted, on Crestor 5 mg    Diet and Lifestyle: not attempting to follow a low fat, low cholesterol diet, does not rigorously follow a diabetic diet, sedentary    Home BP Monitoring: is not measured at home. Diabetic Review of Systems - home glucose monitoring: is performed sporadically, should be 1x/day. Other symptoms and concerns: pt's Dementia continues to progress even  on Namenda and Aricept. His behavioral problems  and difficulty with insomnia have improved on Seroquel. Anxiety remains stable on Xanax, Lexapro and Seroquel. Pt's wife is currently coping with keeping him at home. Pt has chronic leg pain and back pain due to PN and spinal stenosis respectively. Pain is controlled without ultram currently according to his wife. Current Outpatient Medications   Medication Sig    ciclopirox (LOPROX) 0.77 % topical cream Apply pea size to affected area every day for 1 week at a time    ALPRAZolam (XANAX) 0.25 mg tablet TAKE 1 TABLET BY MOUTH 3 TIMES DAILY AS NEEDED FOR ANXIETY. MAX DAILY AMOUNT OF 3 TABS/DAY    QUEtiapine (SEROQUEL) 50 mg tablet TAKE 1 TABLET BY MOUTH ONCE DAILY AT BEDTIME    amLODIPine (NORVASC) 10 mg tablet TAKE 1 TAB BY MOUTH DAILY FOR HYPERTENSION    metoprolol succinate (TOPROL-XL) 50 mg XL tablet TAKE 1 TABLET BY MOUTH ONCE DAILY    rosuvastatin (CRESTOR) 5 mg tablet TAKE 1 TABLET BY MOUTH AT BEDTIME IF NEEDED    irbesartan (AVAPRO) 150 mg tablet Take 1 Tab by mouth nightly.  escitalopram oxalate (LEXAPRO) 10 mg tablet TAKE 1 TABLET BY MOUTH ONCE DAILY    pantoprazole (PROTONIX) 40 mg tablet TAKE 1 TAB BY MOUTH DAILY.  memantine (NAMENDA) 10 mg tablet TAKE 1 TABLET BY MOUTH TWICE DAILY    cholecalciferol (VITAMIN D3) 1,000 unit tablet Take 1,000 Units by mouth daily.  donepezil (ARICEPT) 23 mg film coated tablet     aspirin 81 mg chewable tablet Take 1 Tab by mouth daily.  multivitamin, tx-iron-ca-min (THERA-M W/ IRON) 9 mg iron-400 mcg tab tablet Take 1 Tab by mouth daily.  OTHER Compound Cream  Gabapentin 6%, Baclofen 2%, Cyclobenzaprine 2%, Lidocaine 2%, Flurbiprofen 10% with Ketamine 10%  Apply 1-2 grams (1-2 pumps) to affected area 3-4 times  120grams/2 refills    clotrimazole-betamethasone (LOTRISONE) topical cream Apply 2 g to affected area two (2) times a day. Indications: appply to affected skin bid and prn, apply pea size amount    OTHER Check CBC, CMP, Mg in 5 days, results to PCP immediately, Diagnosis- HTN     No current facility-administered medications for this visit.               Review of Systems  Respiratory: negative for dyspnea on exertion  Cardiovascular: negative for chest pain    Objective:     Visit Vitals  /72 (BP 1 Location: Left arm, BP Patient Position: Sitting)   Pulse 69   Temp 96.7 °F (35.9 °C) (Oral)   Resp 20   Ht 5' 10\" (1.778 m)   Wt 252 lb (114.3 kg)   SpO2 97%   BMI 36.16 kg/m²        General appearance - alert, well appearing, and in no distress  Neck - supple, no significant adenopathy, carotids upstroke normal bilaterally, no bruits  Chest - clear to auscultation, no wheezes, rales or rhonchi, symmetric air entry  Heart - normal rate, regular rhythm, normal S1, S2, no murmurs, rubs, clicks or gallops  Neurological - paresthesias in feet   Extremities - peripheral pulses normal, no pedal edema, no clubbing or cyanosis  Skin - normal coloration and turgor, no rashes, no suspicious skin lesions noted      Labs:   Lab Results   Component Value Date/Time    Hemoglobin A1c 6.4 (H) 02/02/2019 09:03 AM    Hemoglobin A1c 6.4 (H) 09/28/2018 09:37 AM    Hemoglobin A1c 6.2 (H) 05/01/2018 09:07 AM    Glucose 92 02/02/2019 09:03 AM    Glucose (POC) 122 (H) 04/01/2016 11:16 AM    Microalbumin/Creat ratio (mg/g creat) 6 09/28/2018 09:37 AM    Microalbumin,urine random 1.60 09/28/2018 09:37 AM    LDL, calculated 54 02/02/2019 09:03 AM    Creatinine 1.28 02/02/2019 09:03 AM      Lab Results   Component Value Date/Time    Cholesterol, total 124 02/02/2019 09:03 AM    HDL Cholesterol 36 (L) 02/02/2019 09:03 AM    LDL, calculated 54 02/02/2019 09:03 AM    Triglyceride 170 (H) 02/02/2019 09:03 AM    CHOL/HDL Ratio 3.4 02/02/2019 09:03 AM     Lab Results   Component Value Date/Time    ALT (SGPT) 20 02/02/2019 09:03 AM    AST (SGOT) 22 02/02/2019 09:03 AM    Alk. phosphatase 119 (H) 02/02/2019 09:03 AM    Bilirubin, total 0.8 02/02/2019 09:03 AM     Lab Results   Component Value Date/Time    GFR est AA >60 02/02/2019 09:03 AM    GFR est non-AA 54 (L) 02/02/2019 09:03 AM    Creatinine 1.28 02/02/2019 09:03 AM    BUN 13 02/02/2019 09:03 AM    Sodium 141 02/02/2019 09:03 AM    Potassium 3.9 02/02/2019 09:03 AM    Chloride 103 02/02/2019 09:03 AM    CO2 34 (H) 02/02/2019 09:03 AM            Assessment / Plan     Diabetes well controlled, with diet   Hypertension controlled on current meds  Hyperlipidemia well controlled, on crestor. ICD-10-CM ICD-9-CM    1. Type 2 diabetes with nephropathy (HCC) E11.21 250.40 HEMOGLOBIN A1C W/O EAG     583.81    2. HTN (hypertension), benign Y15 892.2 METABOLIC PANEL, COMPREHENSIVE   3. Dyslipidemia E78.5 272.4 LIPID PANEL   4. Late onset Alzheimer's disease with behavioral disturbance G30.1 331.0  stable on Namenda and Aricept with behavior controlled with Seroquel    F02.81 294.11    5.  Chronic midline low back pain without sciatica M54.5 724.2  stable currently off of tramadol    G89.29 338. 29    6. Anxiety F41.9 300.00  controlled on Xanax as needed             Diabetic issues reviewed with him: diabetic diet discussed in detail, and low cholesterol diet, weight control and daily exercise discussed. Follow-up Disposition:  Return in about 4 months (around 6/8/2019) for OV, and Medicare Wellness Visit, labs 1 week before. Reviewed plan of care. Patient has provided input and agrees with goals. Discussed the patient's BMI with him. The BMI follow up plan is as follows:     dietary management education, guidance, and counseling  encourage exercise  monitor weight  prescribed dietary intake    An After Visit Summary was printed and given to the patient.

## 2019-05-24 NOTE — PROGRESS NOTES
Subjective: Chief Complaint The patient presents for follow up of diabetes, hypertension and high cholesterol. HPI Allyssa Paige. is a 66 y.o. male seen for follow up of diabetes. Healso has hypertension and hyperlipidemia. Diabetes well controlled, no significant medication side effects noted, pt off metformin, hypertension  well controlled, no significant medication side effects noted, on Norvasc, Toprol and Avapro,  hyperlipidemia well controlled, no significant medication side effects noted, on Crestor 5 mg Diet and Lifestyle: not attempting to follow a low fat, low cholesterol diet, does not rigorously follow a diabetic diet, sedentary Home BP Monitoring: is not measured at home. Diabetic Review of Systems - home glucose monitoring: is performed sporadically, should be 1x/day. Other symptoms and concerns: pt's Dementia continues to progress even  on Namenda and Aricept. His behavioral problems  and difficulty with insomnia have improved on Seroquel. But today pt's wife c/o significant sundowning with pt pulling out a lot of clothes from 4 pm until 9 pm when he gets Seroquel. Anxiety remains stable on Xanax, Lexapro and Seroquel. Pt's wife is currently coping with keeping him at home. Pt has chronic leg pain and back pain due to PN and spinal stenosis respectively. Pain is controlled without ultram currently according to his wife. She has not filled the script in about a year according to . Current Outpatient Medications Medication Sig  escitalopram oxalate (LEXAPRO) 10 mg tablet TAKE 1 TABLET BY MOUTH EVERY DAY  pantoprazole (PROTONIX) 40 mg tablet TAKE 1 TABLET BY MOUTH EVERY DAY  memantine (NAMENDA) 10 mg tablet TAKE 1 TABLET BY MOUTH TWICE A DAY  ALPRAZolam (XANAX) 0.25 mg tablet TAKE 1 TABLET BY MOUTH 3 TIMES DAILY AS NEEDED FOR ANXIETY. MAX DAILY AMOUNT OF 3 TABS/DAY  ciclopirox (LOPROX) 0.77 % topical cream Apply pea size to affected area every day for 1 week at a time  QUEtiapine (SEROQUEL) 50 mg tablet TAKE 1 TABLET BY MOUTH ONCE DAILY AT BEDTIME  amLODIPine (NORVASC) 10 mg tablet TAKE 1 TAB BY MOUTH DAILY FOR HYPERTENSION  metoprolol succinate (TOPROL-XL) 50 mg XL tablet TAKE 1 TABLET BY MOUTH ONCE DAILY  rosuvastatin (CRESTOR) 5 mg tablet TAKE 1 TABLET BY MOUTH AT BEDTIME IF NEEDED  
 irbesartan (AVAPRO) 150 mg tablet Take 1 Tab by mouth nightly.  clotrimazole-betamethasone (LOTRISONE) topical cream Apply 2 g to affected area two (2) times a day. Indications: appply to affected skin bid and prn, apply pea size amount  cholecalciferol (VITAMIN D3) 1,000 unit tablet Take 1,000 Units by mouth daily.  donepezil (ARICEPT) 23 mg film coated tablet  aspirin 81 mg chewable tablet Take 1 Tab by mouth daily.  OTHER Check CBC, CMP, Mg in 5 days, results to PCP immediately, Diagnosis- HTN  
 multivitamin, tx-iron-ca-min (THERA-M W/ IRON) 9 mg iron-400 mcg tab tablet Take 1 Tab by mouth daily.  OTHER Compound Cream 
Gabapentin 6%, Baclofen 2%, Cyclobenzaprine 2%, Lidocaine 2%, Flurbiprofen 10% with Ketamine 10% Apply 1-2 grams (1-2 pumps) to affected area 3-4 times 120grams/2 refills No current facility-administered medications for this visit. Review of Systems Respiratory: negative for dyspnea on exertion Cardiovascular: negative for chest pain Objective:  
 
Visit Vitals /80 Pulse 65 Temp 97.8 °F (36.6 °C) (Oral) Resp 20 Ht 5' 10\" (1.778 m) Wt 264 lb (119.7 kg) SpO2 97% BMI 37.88 kg/m² General appearance - alert, well appearing, and in no distress Neck - supple, no significant adenopathy, carotids upstroke normal bilaterally, no bruits Chest - clear to auscultation, no wheezes, rales or rhonchi, symmetric air entry Heart - normal rate, regular rhythm, normal S1, S2, no murmurs, rubs, clicks or gallops Neurological - paresthesias in feet Extremities - peripheral pulses normal, no pedal edema, no clubbing or cyanosis Skin - normal coloration and turgor, no rashes, no suspicious skin lesions noted Labs:  
Lab Results Component Value Date/Time Hemoglobin A1c 6.5 (H) 05/10/2019 08:13 AM  
 Hemoglobin A1c 6.4 (H) 02/02/2019 09:03 AM  
 Hemoglobin A1c 6.4 (H) 09/28/2018 09:37 AM  
 Glucose 128 (H) 05/10/2019 08:13 AM  
 Glucose (POC) 122 (H) 04/01/2016 11:16 AM  
 Microalbumin/Creat ratio (mg/g creat) 6 09/28/2018 09:37 AM  
 Microalbumin,urine random 1.60 09/28/2018 09:37 AM  
 LDL, calculated 36.6 05/10/2019 08:13 AM  
 Creatinine 1.39 (H) 05/10/2019 08:13 AM  
  
Lab Results Component Value Date/Time Cholesterol, total 106 05/10/2019 08:13 AM  
 HDL Cholesterol 41 05/10/2019 08:13 AM  
 LDL, calculated 36.6 05/10/2019 08:13 AM  
 Triglyceride 142 05/10/2019 08:13 AM  
 CHOL/HDL Ratio 2.6 05/10/2019 08:13 AM  
 
Lab Results Component Value Date/Time ALT (SGPT) 24 05/10/2019 08:13 AM  
 AST (SGOT) 25 05/10/2019 08:13 AM  
 Alk. phosphatase 118 (H) 05/10/2019 08:13 AM  
 Bilirubin, total 0.7 05/10/2019 08:13 AM  
 
Lab Results Component Value Date/Time GFR est AA 60 (L) 05/10/2019 08:13 AM  
 GFR est non-AA 49 (L) 05/10/2019 08:13 AM  
 Creatinine 1.39 (H) 05/10/2019 08:13 AM  
 BUN 9 05/10/2019 08:13 AM  
 Sodium 143 05/10/2019 08:13 AM  
 Potassium 3.9 05/10/2019 08:13 AM  
 Chloride 106 05/10/2019 08:13 AM  
 CO2 31 05/10/2019 08:13 AM  
  
 
 
 
Assessment / Plan Diabetes well controlled, with diet Hypertension controlled on current meds Hyperlipidemia well controlled, on crestor. ICD-10-CM ICD-9-CM 1. Medicare annual wellness visit, subsequent Z00.00 V70.0 2. Type 2 diabetes with nephropathy (HCC) E11.21 250.40 HEMOGLOBIN A1C W/O EAG  
  583.81 MICROALBUMIN, UR, RAND W/ MICROALB/CREAT RATIO 3. HTN (hypertension), benign O56 369.1 METABOLIC PANEL, COMPREHENSIVE 4.  Dyslipidemia E78.5 272.4 LIPID PANEL  
 5. Late onset Alzheimer's disease with behavioral disturbance G30.1 331.0 Pt continues on on Namenda and Aricept. Pt's wife will give Seroquel early at 4 pm to see if it helps with sundowning. F02.81 294.11   
6. Chronic midline low back pain without sciatica M54.5 724.2 Stable off Ultram    
 G89.29 338.29   
7. Anxiety F41.9 300.00 Controlled on Lexapro and Xanax prn Diabetic issues reviewed with him: diabetic diet discussed in detail, and low cholesterol diet, weight control and daily exercise discussed. Follow-up and Dispositions · Return in about 4 months (around 9/24/2019) for labs 1 week before. Reviewed plan of care. Patient has provided input and agrees with goals. Discussed the patient's BMI with him. The BMI follow up plan is as follows:  
 
dietary management education, guidance, and counseling 
encourage exercise 
monitor weight 
prescribed dietary intake An After Visit Summary was printed and given to the patient.

## 2019-05-24 NOTE — PATIENT INSTRUCTIONS
Medicare Part B Preventive Services Limitations Recommendation Scheduled Bone Mass Measurement 
(age 72 & older, biennial) Requires diagnosis related to osteoporosis or estrogen deficiency. Biennial benefit unless patient has history of long-term glucocorticoid tx or baseline is needed because initial test was by other method Cardiovascular Screening Blood Tests (every 5 years) Total cholesterol, HDL, Triglycerides and ECG Order blood work  as a panel if possible and  for adults with routine risk  an electrocardiogram (ECG) at intervals determined by the provider. Lipid 5/19 Colorectal Cancer Screening 
-Fecal occult blood test (annual) -Flexible sigmoidoscopy (5y) 
-Screening colonoscopy (10y) -Barium Enema Colorectal cancer screening should be done for adults age 54-65 with no increased risk factors for colorectal cancer. There are a number of acceptable methods of screening for this type of cancer. Each test has its own benefits and drawbacks. Discuss with your provider what is most appropriate for you during your annual wellness visit. The different tests include: colonoscopy (considered the best screening method), a fecal occult blood test, a fecal DNA test, and sigmoidoscopy Counseling to Prevent Tobacco Use (up to 8 sessions per year) - Counseling greater than 3 and up to 10 minutes - Counseling greater than 10 minutes Patients must be asymptomatic of tobacco-related conditions to receive as preventive service Diabetes Screening Tests (at least every 3 years, Medicare covers annually or at 6-month intervals for prediabetic patients) Fasting blood sugar (FBS) or glucose tolerance test (GTT) All adults age 38-68 who are overweight should have a diabetes screening test once every three years.  
-Other screening tests & preventive services for persons with diabetes include: an eye exam to screen for diabetic retinopathy, a kidney function test, a foot exam, and stricter control over your cholesterol. 5/19 Diabetes Self-Management Training (DSMT) (no USPSTF recommendation) Requires referral by treating physician for patient with diabetes or renal disease. 10 hours of initial DSMT session of no less than 30 minutes each in a continuous 12-month period. 2 hours of follow-up DSMT in subsequent years. Glaucoma Screening (no USPSTF recommendation) Diabetes mellitus, family history, , age 48 or over,  American, age 72 or over  6/17 Human Immunodeficiency Virus (HIV) Screening (annually for increased risk patients) HIV-1 and HIV-2 by EIA, FLORENCIO, rapid antibody test, or oral mucosa transudate Patient must be at increased risk for HIV infection per USPSTF guidelines or pregnant. Tests covered annually for patients at increased risk. Pregnant patients may receive up to 3 test during pregnancy. Medical Nutrition Therapy (MNT) (for diabetes or renal disease not recommended schedule) Requires referral by treating physician for patient with diabetes or renal disease. Can be provided in same year as diabetes self-management training (DSMT), and CMS recommends medical nutrition therapy take place after DSMT. Up to 3 hours for initial year and 2 hours in subsequent years. Prostate Cancer Screening (annually up to age 76) - Digital rectal exam (HEATHER) - Prostate specific antigen (PSA) Annually (age 48 or over), HEATHER not paid separately when covered E/M service is provided on same date Men up to age 76 may need a screening blood test for prostate cancer at certain intervals, depending on their personal and family history. This decision is between the patient and his provider. Seasonal Influenza Vaccination (annually) All adults should have a flu vaccine yearly Pneumococcal Vaccination (once after 72) All adults  over age 72 should receive the recommended pneumonia vaccines.  Current USPSTF guidelines recommend a series of two vaccines for the best pneumonia protection. [id number] 5/18 
[id number] 10/13 Hepatitis B Vaccinations (if medium/high risk) Medium/high risk factors:  End-stage renal disease, Hemophiliacs who received Factor VIII or IX concentrates, Clients of institutions for the mentally retarded, Persons who live in the same house as a HepB virus carrier, Homosexual men, Illicit injectable drug abusers. Shingles Vaccination A shingles vaccine is also recommended once in a lifetime after age 61 Ultrasound Screening for Abdominal Aortic Aneurysm (AAA) (once) An Abdominal Aortic Aneurysm (AAA) Screening is recommended for men age 73-68 who has ever smoked in their lifetime. of the following criteria: 
- Men who are 73-68 years old and have smoked more than 100 cigarettes in their lifetime. 
-Anyone with a FH of AAA 
-Anyone recommended for screening by USPSTF Hep C All adults born between [birth year] and 1965 should be screened once for Hepatitis C Tetanus  All adults should have a tetanus vaccine every 10 years  4/17

## 2019-05-24 NOTE — PROGRESS NOTES
Patient is in the office today for 4 month follow up and medicare wellness visit. 1. Have you been to the ER, urgent care clinic since your last visit? Hospitalized since your last visit? No 
 
2. Have you seen or consulted any other health care providers outside of the [address] [place] [address] since your last visit? Include any pap smears or colon screening. No 
 
 
This is the Subsequent Medicare Annual Wellness Exam, performed 12 months or more after the Initial AWV or the last Subsequent AWV I have reviewed the patient's medical history in detail and updated the computerized patient record. History Past Medical History:  
Diagnosis Date  Anxiety  Arthritis  Cancer [facility]) 2009  
 bladder  Carotid duplex 05/26/2011 No occlusive disease >49% bilaterally.  Chronic back pain 5/6/2010  Chronic traumatic encephalopathy  Dementia  Depression   
 he has taken Xanax for 16 years for this  Diabetes ([address]  Dizziness  ED (erectile dysfunction)  Fibrosis of knee joint Peripatellar, right  Headache(784.0)  HTN (hypertension), benign 4/1/2010  Late onset Alzheimer's disease with behavioral disturbance  Left wrist pain  Lumbar spinal stenosis 9/25/2010  Neuropathy of lower extremity  Obesity  Osteoarthritis of both knees  Sleep apnea   
 cpap use  SOB (shortness of breath)  Vertigo, benign positional   
  
Past Surgical History:  
Procedure Laterality Date  BIOPSY  12/19/2007  HX OTHER SURGICAL  11/2014  
 right knee  HX UROLOGICAL Bladder CA Current Outpatient Medications Medication Sig Dispense Refill  escitalopram oxalate (LEXAPRO) 10 mg tablet TAKE 1 TABLET BY MOUTH EVERY DAY 90 Tab 2  
 pantoprazole (PROTONIX) 40 mg tablet TAKE 1 TABLET BY MOUTH EVERY DAY 90 Tab 2  
 memantine (NAMENDA) 10 mg tablet TAKE 1 TABLET BY MOUTH TWICE A  Tab 2  
  ALPRAZolam (XANAX) 0.25 mg tablet TAKE 1 TABLET BY MOUTH 3 TIMES DAILY AS NEEDED FOR ANXIETY. MAX DAILY AMOUNT OF 3 TABS/DAY 90 Tab 2  
 ciclopirox (LOPROX) 0.77 % topical cream Apply pea size to affected area every day for 1 week at a time 90 g 2  
 QUEtiapine (SEROQUEL) 50 mg tablet TAKE 1 TABLET BY MOUTH ONCE DAILY AT BEDTIME 90 Tab 2  
 amLODIPine (NORVASC) 10 mg tablet TAKE 1 TAB BY MOUTH DAILY FOR HYPERTENSION 90 Tab 1  
 metoprolol succinate (TOPROL-XL) 50 mg XL tablet TAKE 1 TABLET BY MOUTH ONCE DAILY 90 Tab 1  
 rosuvastatin (CRESTOR) 5 mg tablet TAKE 1 TABLET BY MOUTH AT BEDTIME IF NEEDED 90 Tab 1  
 irbesartan (AVAPRO) 150 mg tablet Take 1 Tab by mouth nightly. 90 Tab 3  clotrimazole-betamethasone (LOTRISONE) topical cream Apply 2 g to affected area two (2) times a day. Indications: appply to affected skin bid and prn, apply pea size amount  cholecalciferol (VITAMIN D3) 1,000 unit tablet Take 1,000 Units by mouth daily.  donepezil (ARICEPT) 23 mg film coated tablet   0  
 aspirin 81 mg chewable tablet Take 1 Tab by mouth daily. 30 Tab 0  
 OTHER Check CBC, CMP, Mg in 5 days, results to PCP immediately, Diagnosis- HTN 1 Each 0  
 multivitamin, tx-iron-ca-min (THERA-M W/ IRON) 9 mg iron-400 mcg tab tablet Take 1 Tab by mouth daily.  OTHER Compound Cream 
Gabapentin 6%, Baclofen 2%, Cyclobenzaprine 2%, Lidocaine 2%, Flurbiprofen 10% with Ketamine 10% Apply 1-2 grams (1-2 pumps) to affected area 3-4 times 120grams/2 refills Allergies Allergen Reactions  Chlorhexidine Towelette Itching Family History Problem Relation Age of Onset  Hypertension Mother  Heart Disease Mother Sidlincoln.Krystal Other Mother Alzheimer's disease  Diabetes Neg Hx Social History Tobacco Use  Smoking status: Former Smoker  Smokeless tobacco: Never Used  Tobacco comment: quit smoking in 2000 Substance Use Topics  Alcohol use: No  
  Alcohol/week: 0.0 oz  
 
 Patient Active Problem List  
Diagnosis Code  Headache R51  Depression F32.9  ED (erectile dysfunction) N52.9  Vertigo, benign positional H81.10  Sleep apnea G47.30  
 HTN (hypertension), benign I10  Chronic back pain M54.9, G89.29  
 PN (peripheral neuropathy) G62.9  High triglycerides E78.1  Lumbar spinal stenosis M48.061  
 Anxiety F41.9  Fatigue R53.83  
 Memory loss R41.3  DM (diabetes mellitus) ([facility] [address] E11.9  Malignant neoplasm of bladder, part unspecified C67.9  
 Urinary system symptoms, other  Borderline high cholesterol E78.9  
 SOB (shortness of breath) R06.02  
 Dizziness R42  Dementia F03.90  Dyslipidemia E78.5  Osteoarthritis of right knee M17.11  
 Anemia D64.9  
 GI bleed due to NSAIDs K92.2, T39.395A  CTE (chronic traumatic encephalopathy) F07.81  
 Status post total right knee replacement Z96.651  Primary osteoarthritis of left knee M17.12  
 ACP (advance care planning) Z71.89  Late onset Alzheimer's disease with behavioral disturbance G30.1, F02.81  
 ACS (acute coronary syndrome) ([facility]) I24.9  Troponin level elevated R74.8  Sebaceous cyst L72.3  Type 2 diabetes with nephropathy ([facility]) E11.21  
 Type 2 diabetes mellitus with diabetic neuropathy ([facility]) E11.40  Severe obesity ([facility]) E66.01 Depression Risk Factor Screening: No flowsheet data found. Alcohol Risk Factor Screening: You do not drink alcohol or very rarely. Functional Ability and Level of Safety:  
Hearing Loss Hearing is good. Activities of Daily Living The home contains: no safety equipment. Patient does total self care Fall Risk Fall Risk Assessment, last 12 mths 5/24/2019 Able to walk? Yes Fall in past 12 months? Yes Fall with injury? No  
Number of falls in past 12 months 1 Fall Risk Score 1 Abuse Screen Patient is not abused Cognitive Screening Evaluation of Cognitive Function: Has your family/caregiver stated any concerns about your memory: no 
Normal 
 
Patient Care Team  
Patient Care Team: 
Aravind Whitlock MD as PCP - General 
Clarence Lam DPM (Podiatry) Jonathon Conway MD (Neurology) Ame Eisenberg MD (Orthopedic Surgery) Dior Vieira MD (Ophthalmology) Pepe Brush MD as Surgeon (Surgery) Glaucoma Screening- followed by Dr Marcelino Atwood seen in 2016 Pneumonia Vaccine- UTD Shingles Vaccine-  Never had chicken pox Tdap Vaccine- declines at this time Colonoscopy-  Not candidate due to age and dementia PSA- 6/16 0.8. No further testing needed due to age and Dementia Advance Directive-  Pt has one. Will get copy Assessment/Plan Education and counseling provided: 
Are appropriate based on today's review and evaluation Health Maintenance Due Topic Date Due  Shingrix Vaccine Age 50> (1 of 2) 06/16/1990  
 FOOT EXAM Q1  06/15/2016  COLONOSCOPY  01/22/2017  MEDICARE YEARLY EXAM  05/05/2019  GLAUCOMA SCREENING Q2Y  06/06/2019  
 EYE EXAM RETINAL OR DILATED  06/06/2019 A comprehensive 5 year plan for medical care and screening exams was reviewed with pt and they received a copy of it.

## 2019-06-17 NOTE — TELEPHONE ENCOUNTER
According to wife pt is doing better on Seroquel 50 mg BID. He has less agitation in the evening and is sleeping better.  She gives him at 4 pm and QHS

## 2019-09-27 NOTE — PATIENT INSTRUCTIONS
High Blood Pressure: Care Instructions Overview It's normal for blood pressure to go up and down throughout the day. But if it stays up, you have high blood pressure. Another name for high blood pressure is hypertension. Despite what a lot of people think, high blood pressure usually doesn't cause headaches or make you feel dizzy or lightheaded. It usually has no symptoms. But it does increase your risk of stroke, heart attack, and other problems. You and your doctor will talk about your risks of these problems based on your blood pressure. Your doctor will give you a goal for your blood pressure. Your goal will be based on your health and your age. Lifestyle changes, such as eating healthy and being active, are always important to help lower blood pressure. You might also take medicine to reach your blood pressure goal. 
Follow-up care is a key part of your treatment and safety. Be sure to make and go to all appointments, and call your doctor if you are having problems. It's also a good idea to know your test results and keep a list of the medicines you take. How can you care for yourself at home? Medical treatment · If you stop taking your medicine, your blood pressure will go back up. You may take one or more types of medicine to lower your blood pressure. Be safe with medicines. Take your medicine exactly as prescribed. Call your doctor if you think you are having a problem with your medicine. · Talk to your doctor before you start taking aspirin every day. Aspirin can help certain people lower their risk of a heart attack or stroke. But taking aspirin isn't right for everyone, because it can cause serious bleeding. · See your doctor regularly. You may need to see the doctor more often at first or until your blood pressure comes down. · If you are taking blood pressure medicine, talk to your doctor before you take decongestants or anti-inflammatory medicine, such as ibuprofen. Some of these medicines can raise blood pressure. · Learn how to check your blood pressure at home. Lifestyle changes · Stay at a healthy weight. This is especially important if you put on weight around the waist. Losing even 10 pounds can help you lower your blood pressure. · If your doctor recommends it, get more exercise. Walking is a good choice. Bit by bit, increase the amount you walk every day. Try for at least 30 minutes on most days of the week. You also may want to swim, bike, or do other activities. · Avoid or limit alcohol. Talk to your doctor about whether you can drink any alcohol. · Try to limit how much sodium you eat to less than 2,300 milligrams (mg) a day. Your doctor may ask you to try to eat less than 1,500 mg a day. · Eat plenty of fruits (such as bananas and oranges), vegetables, legumes, whole grains, and low-fat dairy products. · Lower the amount of saturated fat in your diet. Saturated fat is found in animal products such as milk, cheese, and meat. Limiting these foods may help you lose weight and also lower your risk for heart disease. · Do not smoke. Smoking increases your risk for heart attack and stroke. If you need help quitting, talk to your doctor about stop-smoking programs and medicines. These can increase your chances of quitting for good. When should you call for help? Call  911 anytime you think you may need emergency care. This may mean having symptoms that suggest that your blood pressure is causing a serious heart or blood vessel problem. Your blood pressure may be over 180/120. 
 For example, call  911 if: 
  · You have symptoms of a heart attack. These may include: 
? Chest pain or pressure, or a strange feeling in the chest. 
? Sweating. ? Shortness of breath. ? Nausea or vomiting. ? Pain, pressure, or a strange feeling in the back, neck, jaw, or upper belly or in one or both shoulders or arms. ? Lightheadedness or sudden weakness. ? A fast or irregular heartbeat.  
  · You have symptoms of a stroke. These may include: 
? Sudden numbness, tingling, weakness, or loss of movement in your face, arm, or leg, especially on only one side of your body. ? Sudden vision changes. ? Sudden trouble speaking. ? Sudden confusion or trouble understanding simple statements. ? Sudden problems with walking or balance. ? A sudden, severe headache that is different from past headaches.  
  · You have severe back or belly pain.  
 Do not wait until your blood pressure comes down on its own. Get help right away. 
 Call your doctor now or seek immediate care if: 
  · Your blood pressure is much higher than normal (such as 180/120 or higher), but you don't have symptoms.  
  · You think high blood pressure is causing symptoms, such as: 
? Severe headache. 
? Blurry vision.  
 Watch closely for changes in your health, and be sure to contact your doctor if: 
  · Your blood pressure measures higher than your doctor recommends at least 2 times. That means the top number is higher or the bottom number is higher, or both.  
  · You think you may be having side effects from your blood pressure medicine. Where can you learn more? Go to http://christie-brigid.info/. Enter R683 in the search box to learn more about \"High Blood Pressure: Care Instructions. \" Current as of: April 9, 2019 Content Version: 12.2 © 4402-3765 [employer]. Care instructions adapted under license by [employer] (which disclaims liability or warranty for this information). If you have questions about a medical condition or this instruction, always ask your healthcare professional. [place] [address] any warranty or liability for your use of this information.

## 2019-09-27 NOTE — PROGRESS NOTES
Subjective: Chief Complaint The patient presents for follow up of diabetes, hypertension and high cholesterol. HPI Reina Ward. is a 78 y.o. male seen for follow up of diabetes. Healso has hypertension and hyperlipidemia. Diabetes well controlled with, pt off metformin, hypertension  well controlled, no significant medication side effects noted, on Norvasc, Toprol and Avapro,  hyperlipidemia well controlled, no significant medication side effects noted, on Crestor 5 mg Diet and Lifestyle: not attempting to follow a low fat, low cholesterol diet, does not rigorously follow a diabetic diet, sedentary Home BP Monitoring: is not measured at home. Diabetic Review of Systems - home glucose monitoring: is performed sporadically, should be 1x/day. Other symptoms and concerns: pt's Dementia continues to progress even  on Namenda and Aricept. His behavioral problems  and difficulty with insomnia have improved on Seroquel. Anxiety remains stable on Xanax, Lexapro and Seroquel. Pt's wife is currently coping with keeping him at home. Pt has chronic leg pain and back pain due to PN and spinal stenosis respectively. Pain is controlled without ultram currently according to his wife. She has not filled the script in about a year according to . Current Outpatient Medications Medication Sig  
 irbesartan (AVAPRO) 150 mg tablet TAKE 1 TABLET BY MOUTH EVERY DAY AT NIGHT  ALPRAZolam (XANAX) 0.25 mg tablet TAKE 1 TABLET BY MOUTH 3 TIMES DAILY AS NEEDED FOR ANXIETY.  QUEtiapine (SEROQUEL) 50 mg tablet TAKE 1 TABLET BY MOUTH EVERYDAY AT BEDTIME  amLODIPine (NORVASC) 10 mg tablet TAKE 1 TABLET BY MOUTH EVERY DAY  rosuvastatin (CRESTOR) 5 mg tablet TAKE 1 TABLET BY MOUTH AT BEDTIME IF NEEDED  
 escitalopram oxalate (LEXAPRO) 10 mg tablet TAKE 1 TABLET BY MOUTH EVERY DAY  pantoprazole (PROTONIX) 40 mg tablet TAKE 1 TABLET BY MOUTH EVERY DAY  
  memantine (NAMENDA) 10 mg tablet TAKE 1 TABLET BY MOUTH TWICE A DAY  ciclopirox (LOPROX) 0.77 % topical cream Apply pea size to affected area every day for 1 week at a time  metoprolol succinate (TOPROL-XL) 50 mg XL tablet TAKE 1 TABLET BY MOUTH ONCE DAILY  clotrimazole-betamethasone (LOTRISONE) topical cream Apply 2 g to affected area two (2) times a day. Indications: appply to affected skin bid and prn, apply pea size amount  cholecalciferol (VITAMIN D3) 1,000 unit tablet Take 1,000 Units by mouth daily.  donepezil (ARICEPT) 23 mg film coated tablet  aspirin 81 mg chewable tablet Take 1 Tab by mouth daily.  OTHER Check CBC, CMP, Mg in 5 days, results to PCP immediately, Diagnosis- HTN  
 multivitamin, tx-iron-ca-min (THERA-M W/ IRON) 9 mg iron-400 mcg tab tablet Take 1 Tab by mouth daily.  OTHER Compound Cream 
Gabapentin 6%, Baclofen 2%, Cyclobenzaprine 2%, Lidocaine 2%, Flurbiprofen 10% with Ketamine 10% Apply 1-2 grams (1-2 pumps) to affected area 3-4 times 120grams/2 refills No current facility-administered medications for this visit. Review of Systems Respiratory: negative for dyspnea on exertion Cardiovascular: negative for chest pain Objective:  
 
Visit Vitals /71 (BP 1 Location: Left arm, BP Patient Position: Sitting) Pulse (!) 101 Temp 98 °F (36.7 °C) (Oral) Resp 20 Ht 5' 10\" (1.778 m) Wt 262 lb (118.8 kg) SpO2 94% BMI 37.59 kg/m² General appearance - alert, well appearing, and in no distress Neck - supple, no significant adenopathy, carotids upstroke normal bilaterally, no bruits Chest - clear to auscultation, no wheezes, rales or rhonchi, symmetric air entry Heart - normal rate, regular rhythm, normal S1, S2, no murmurs, rubs, clicks or gallops Extremities - peripheral pulses normal, no pedal edema, no clubbing or cyanosis Skin - normal coloration and turgor, no rashes, no suspicious skin lesions noted Labs: Lab Results Component Value Date/Time Hemoglobin A1c 6.3 (H) 09/21/2019 10:00 AM  
 Hemoglobin A1c 6.5 (H) 05/10/2019 08:13 AM  
 Hemoglobin A1c 6.4 (H) 02/02/2019 09:03 AM  
 Glucose 133 (H) 09/21/2019 10:00 AM  
 Glucose (POC) 122 (H) 04/01/2016 11:16 AM  
 Microalbumin/Creat ratio (mg/g creat) 6 09/21/2019 10:00 AM  
 Microalbumin,urine random 1.24 09/21/2019 10:00 AM  
 LDL, calculated 40.8 09/21/2019 10:00 AM  
 Creatinine 1.38 (H) 09/21/2019 10:00 AM  
  
Lab Results Component Value Date/Time Cholesterol, total 108 09/21/2019 10:00 AM  
 HDL Cholesterol 39 (L) 09/21/2019 10:00 AM  
 LDL, calculated 40.8 09/21/2019 10:00 AM  
 Triglyceride 141 09/21/2019 10:00 AM  
 CHOL/HDL Ratio 2.8 09/21/2019 10:00 AM  
 
Lab Results Component Value Date/Time ALT (SGPT) 26 09/21/2019 10:00 AM  
 AST (SGOT) 26 09/21/2019 10:00 AM  
 Alk. phosphatase 111 09/21/2019 10:00 AM  
 Bilirubin, total 0.8 09/21/2019 10:00 AM  
 
Lab Results Component Value Date/Time GFR est AA >60 09/21/2019 10:00 AM  
 GFR est non-AA 50 (L) 09/21/2019 10:00 AM  
 Creatinine 1.38 (H) 09/21/2019 10:00 AM  
 BUN 13 09/21/2019 10:00 AM  
 Sodium 140 09/21/2019 10:00 AM  
 Potassium 3.8 09/21/2019 10:00 AM  
 Chloride 103 09/21/2019 10:00 AM  
 CO2 32 09/21/2019 10:00 AM  
  
 
 
 
Assessment / Plan Diabetes well controlled, with diet Hypertension controlled on current meds Hyperlipidemia well controlled, on crestor. ICD-10-CM ICD-9-CM 1. HTN (hypertension), benign J45 665.4 METABOLIC PANEL, COMPREHENSIVE 2. Type 2 diabetes with nephropathy (HCC) E11.21 250.40 HEMOGLOBIN A1C W/O EAG  
  583.81   
3. Dyslipidemia E78.5 272.4 LIPID PANEL 4. Late onset Alzheimer's disease with behavioral disturbance G30.1 331.0 Fairly stable on Namenda and Aricept F02.81 294.11   
5. Anxiety F41.9 300.00 Controlled on Seroquel, Lexapro and Xanax 6.  Chronic midline low back pain without sciatica M54.5 724.2 Controlled currently off pain meds G89.29 338.29   
7. Encounter for immunization Z23 V03.89 INFLUENZA VACCINE INACTIVATED (IIV), SUBUNIT, ADJUVANTED, IM Diabetic issues reviewed with him: diabetic diet discussed in detail, and low cholesterol diet, weight control and daily exercise discussed. Follow-up and Dispositions · Return in about 4 months (around 1/27/2020) for labs 1 week before. Reviewed plan of care. Patient has provided input and agrees with goals. Discussed the patient's BMI with him. The BMI follow up plan is as follows:  
 
dietary management education, guidance, and counseling 
encourage exercise 
monitor weight 
prescribed dietary intake An After Visit Summary was printed and given to the patient.

## 2019-09-27 NOTE — PROGRESS NOTES
Patient is in the office today for 4 month follow up. 1. Have you been to the ER, urgent care clinic since your last visit? Hospitalized since your last visit? No 
 
2. Have you seen or consulted any other health care providers outside of the [address] [place] [address] since your last visit? Include any pap smears or colon screening.  No

## 2019-10-22 NOTE — ED TRIAGE NOTES
The pt arrived with his wife with a CC of grunting. He is grunting repeatedly like he is trying to clear his throat. The pt is able to stop when being talked . The pt has a hx of dementia.

## 2019-10-22 NOTE — DISCHARGE INSTRUCTIONS
Patient Education        Abscessed Tooth: Care Instructions  Your Care Instructions    An abscessed tooth is a tooth that has a pocket of pus in the tissues around it. Pus forms when the body tries to fight an infection caused by bacteria. If the pus cannot drain, it forms an abscess. An abscessed tooth can cause red, swollen gums and throbbing pain, especially when you chew. You may have a bad taste in your mouth and a fever, and your jaw may swell. Damage to the tooth, untreated tooth decay, or gum disease can cause an abscessed tooth. An abscessed tooth needs to be treated by a dental professional right away. If it is not treated, the infection could spread to other parts of your body. Your dentist will give you antibiotics to stop the infection. He or she may make a hole in the tooth or cut open (toy) the abscess inside your mouth so that the infection can drain, which should relieve your pain. You may need to have a root canal treatment, which tries to save your tooth by taking out the infected pulp and replacing it with a healing medicine and/or a filling. If these treatments do not work, your tooth may have to be removed. Follow-up care is a key part of your treatment and safety. Be sure to make and go to all appointments, and call your doctor if you are having problems. It's also a good idea to know your test results and keep a list of the medicines you take. How can you care for yourself at home? · Reduce pain and swelling in your face and jaw by putting ice or a cold pack on the outside of your cheek for 10 to 20 minutes at a time. Put a thin cloth between the ice and your skin. · Take pain medicines exactly as directed. ? If the doctor gave you a prescription medicine for pain, take it as prescribed. ? If you are not taking a prescription pain medicine, ask your doctor if you can take an over-the-counter medicine. · Take your antibiotics as directed.  Do not stop taking them just because you feel better. You need to take the full course of antibiotics. To prevent tooth abscess  · Brush and floss every day, and have regular dental checkups. · Eat a healthy diet, and avoid sugary foods and drinks. · Do not smoke, use e-cigarettes with nicotine, or use spit tobacco. Tobacco and nicotine slow your ability to heal. Tobacco also increases your risk for gum disease and cancer of the mouth and throat. If you need help quitting, talk to your doctor about stop-smoking programs and medicines. These can increase your chances of quitting for good. When should you call for help? Call 911 anytime you think you may need emergency care. For example, call if:    · You have trouble breathing.    Call your doctor now or seek immediate medical care if:    · You have new or worse symptoms of infection, such as:  ? Increased pain, swelling, warmth, or redness. ? Red streaks leading from the area. ? Pus draining from the area. ? A fever.    Watch closely for changes in your health, and be sure to contact your doctor if:    · You do not get better as expected. Where can you learn more? Go to http://christie-brigid.info/. Enter T150 in the search box to learn more about \"Abscessed Tooth: Care Instructions. \"  Current as of: October 3, 2018  Content Version: 12.2  © 3805-6628 [employer]. Care instructions adapted under license by [employer] (which disclaims liability or warranty for this information). If you have questions about a medical condition or this instruction, always ask your healthcare professional. [place] [address] any warranty or liability for your use of this information.

## 2019-10-22 NOTE — ED PROVIDER NOTES
EMERGENCY DEPARTMENT HISTORY AND PHYSICAL EXAM 
 
Date: 10/22/2019 Patient Name: Alyce Sparks. 
 
History of Presenting Illness Chief Complaint Patient presents with  Shortness of Breath History Provided By: Patient and Patient's Wife Additional History (Context): Kaylee Bo Sr. is a 78 y.o. male with diabetes, hypertension, obesity, osteoarthritis, malignancy and bladder ca; anxiety, ED, depression, dementia who presents with choking sensation s/p meals x 3d, resolving w/Tums, but not today. Has had persistent symptoms x 45min pta. Denies drooling, stridor, CP. Feels SOB. PCP: Leslie Lebron MD 
 
Current Outpatient Medications Medication Sig Dispense Refill  amoxicillin-clavulanate (AUGMENTIN) 875-125 mg per tablet Take 1 Tab by mouth two (2) times a day for 10 days. 20 Tab 0  
 LORazepam (ATIVAN) 0.5 mg tablet Take 1-2 Tabs by mouth every eight (8) hours as needed for Anxiety (grunting). Max Daily Amount: 3 mg. 20 Tab 0  
 pantoprazole (PROTONIX) 40 mg tablet TAKE 1 TABLET BY MOUTH EVERY DAY 90 Tab 2  
 irbesartan (AVAPRO) 150 mg tablet TAKE 1 TABLET BY MOUTH EVERY DAY AT NIGHT 90 Tab 3  ALPRAZolam (XANAX) 0.25 mg tablet TAKE 1 TABLET BY MOUTH 3 TIMES DAILY AS NEEDED FOR ANXIETY.  90 Tab 2  
 QUEtiapine (SEROQUEL) 50 mg tablet TAKE 1 TABLET BY MOUTH EVERYDAY AT BEDTIME 90 Tab 2  
 amLODIPine (NORVASC) 10 mg tablet TAKE 1 TABLET BY MOUTH EVERY DAY 30 Tab 5  
 rosuvastatin (CRESTOR) 5 mg tablet TAKE 1 TABLET BY MOUTH AT BEDTIME IF NEEDED 90 Tab 1  
 escitalopram oxalate (LEXAPRO) 10 mg tablet TAKE 1 TABLET BY MOUTH EVERY DAY 90 Tab 2  
 memantine (NAMENDA) 10 mg tablet TAKE 1 TABLET BY MOUTH TWICE A  Tab 2  
 ciclopirox (LOPROX) 0.77 % topical cream Apply pea size to affected area every day for 1 week at a time 90 g 2  
 metoprolol succinate (TOPROL-XL) 50 mg XL tablet TAKE 1 TABLET BY MOUTH ONCE DAILY 90 Tab 1  
  clotrimazole-betamethasone (LOTRISONE) topical cream Apply 2 g to affected area two (2) times a day. Indications: appply to affected skin bid and prn, apply pea size amount  cholecalciferol (VITAMIN D3) 1,000 unit tablet Take 1,000 Units by mouth daily.  donepezil (ARICEPT) 23 mg film coated tablet   0  
 aspirin 81 mg chewable tablet Take 1 Tab by mouth daily. 30 Tab 0  
 OTHER Check CBC, CMP, Mg in 5 days, results to PCP immediately, Diagnosis- HTN 1 Each 0  
 multivitamin, tx-iron-ca-min (THERA-M W/ IRON) 9 mg iron-400 mcg tab tablet Take 1 Tab by mouth daily.  OTHER Compound Cream 
Gabapentin 6%, Baclofen 2%, Cyclobenzaprine 2%, Lidocaine 2%, Flurbiprofen 10% with Ketamine 10% Apply 1-2 grams (1-2 pumps) to affected area 3-4 times 120grams/2 refills Past History Past Medical History: 
Past Medical History:  
Diagnosis Date  Anxiety  Arthritis  Cancer [facility]) 2009  
 bladder  Carotid duplex 05/26/2011 No occlusive disease >49% bilaterally.  Chronic back pain 5/6/2010  Chronic traumatic encephalopathy  Dementia ([address]  Depression   
 he has taken Xanax for 16 years for this  Diabetes ([address]  Dizziness  ED (erectile dysfunction)  Fibrosis of knee joint Peripatellar, right  Headache(784.0)  HTN (hypertension), benign 4/1/2010  Late onset Alzheimer's disease with behavioral disturbance ([address]  Left wrist pain  Lumbar spinal stenosis 9/25/2010  Neuropathy of lower extremity  Obesity  Osteoarthritis of both knees  Sleep apnea   
 cpap use  SOB (shortness of breath)  Vertigo, benign positional   
 
 
Past Surgical History: 
Past Surgical History:  
Procedure Laterality Date  BIOPSY  12/19/2007  HX OTHER SURGICAL  11/2014  
 right knee  HX UROLOGICAL Bladder CA Family History: 
Family History Problem Relation Age of Onset  Hypertension Mother  Heart Disease Mother [facility] Other Mother Alzheimer's disease  Diabetes Neg Hx Social History: 
Social History Tobacco Use  Smoking status: Former Smoker  Smokeless tobacco: Never Used  Tobacco comment: quit smoking in 2000 Substance Use Topics  Alcohol use: No  
  Alcohol/week: 0.0 standard drinks  Drug use: No  
 
 
Allergies: Allergies Allergen Reactions  Chlorhexidine Towelette Itching Review of Systems Review of Systems Constitutional: Negative for fever and unexpected weight change. Respiratory: Positive for choking and shortness of breath. Negative for wheezing and stridor. Cardiovascular: Negative for chest pain. Gastrointestinal: Negative for vomiting. All Other Systems Negative Physical Exam  
 
Vitals:  
 10/22/19 1050 10/22/19 1052 10/22/19 1056 BP:  149/72 Pulse: 99 Resp: 18 Temp:   97.6 °F (36.4 °C) SpO2: 97% Weight: 114.3 kg (252 lb) Height: 5' 9\" (1.753 m) Physical Exam  
Constitutional: Vital signs are normal. He appears well-developed and well-nourished. He is active. Non-toxic appearance. He does not appear ill. He appears distressed. HENT:  
Head: Normocephalic and atraumatic. Neck: Normal range of motion. Neck supple. Carotid bruit is not present. No tracheal deviation present. No thyromegaly present. Cardiovascular: Normal rate, regular rhythm and normal heart sounds. Exam reveals no gallop and no friction rub. No murmur heard. Pulmonary/Chest: Effort normal and breath sounds normal. No stridor. No respiratory distress. He has no wheezes. He has no rales. He exhibits no tenderness. Abdominal: Soft. He exhibits no distension and no mass. There is no tenderness. There is no rebound, no guarding and no CVA tenderness. Musculoskeletal: Normal range of motion. Neurological: He is alert. Skin: Skin is warm, dry and intact. He is not diaphoretic. No pallor. Psychiatric: He has a normal mood and affect. His speech is normal and behavior is normal. Judgment and thought content normal.  
Nursing note and vitals reviewed. Diagnostic Study Results Labs - Recent Results (from the past 12 hour(s)) EKG, 12 LEAD, INITIAL Collection Time: 10/22/19 10:54 AM  
Result Value Ref Range Ventricular Rate 79 BPM  
 Atrial Rate 79 BPM  
 P-R Interval 138 ms QRS Duration 78 ms Q-T Interval 372 ms QTC Calculation (Bezet) 426 ms Calculated P Axis -3 degrees Calculated R Axis 19 degrees Calculated T Axis 21 degrees Diagnosis Normal sinus rhythm Normal ECG When compared with ECG of 17-JUL-2016 04:46, No significant change was found Confirmed by Madeline Vieira MD, Timothy Ventura ([id number]) on 10/22/2019 11:01:23 AM 
  
CBC WITH AUTOMATED DIFF Collection Time: 10/22/19 10:55 AM  
Result Value Ref Range WBC 5.2 4.6 - 13.2 K/uL  
 RBC 4.59 (L) 4.70 - 5.50 M/uL  
 HGB 13.9 13.0 - 16.0 g/dL HCT 42.1 36.0 - 48.0 % MCV 91.7 74.0 - 97.0 FL  
 MCH 30.3 24.0 - 34.0 PG  
 MCHC 33.0 31.0 - 37.0 g/dL  
 RDW 13.9 11.6 - 14.5 % PLATELET 507 792 - 333 K/uL MPV 10.0 9.2 - 11.8 FL  
 NEUTROPHILS 64 40 - 73 % LYMPHOCYTES 26 21 - 52 % MONOCYTES 8 3 - 10 % EOSINOPHILS 2 0 - 5 % BASOPHILS 0 0 - 2 %  
 ABS. NEUTROPHILS 3.3 1.8 - 8.0 K/UL  
 ABS. LYMPHOCYTES 1.4 0.9 - 3.6 K/UL  
 ABS. MONOCYTES 0.4 0.05 - 1.2 K/UL  
 ABS. EOSINOPHILS 0.1 0.0 - 0.4 K/UL  
 ABS. BASOPHILS 0.0 0.0 - 0.1 K/UL  
 DF AUTOMATED METABOLIC PANEL, COMPREHENSIVE Collection Time: 10/22/19 10:55 AM  
Result Value Ref Range Sodium 145 136 - 145 mmol/L Potassium 3.7 3.5 - 5.5 mmol/L Chloride 109 100 - 111 mmol/L  
 CO2 29 21 - 32 mmol/L Anion gap 7 3.0 - 18 mmol/L Glucose 105 (H) 74 - 99 mg/dL BUN 13 7.0 - 18 MG/DL Creatinine 1.48 (H) 0.6 - 1.3 MG/DL  
 BUN/Creatinine ratio 9 (L) 12 - 20 GFR est AA 56 (L) >60 ml/min/1.73m2 GFR est non-AA 46 (L) >60 ml/min/1.73m2 Calcium 8.8 8.5 - 10.1 MG/DL Bilirubin, total 1.0 0.2 - 1.0 MG/DL  
 ALT (SGPT) 20 16 - 61 U/L  
 AST (SGOT) 27 10 - 38 U/L Alk. phosphatase 95 45 - 117 U/L Protein, total 7.8 6.4 - 8.2 g/dL Albumin 4.0 3.4 - 5.0 g/dL Globulin 3.8 2.0 - 4.0 g/dL A-G Ratio 1.1 0.8 - 1.7    
TROPONIN I Collection Time: 10/22/19 10:55 AM  
Result Value Ref Range Troponin-I, QT <0.02 0.0 - 0.045 NG/ML  
PTT Collection Time: 10/22/19 10:55 AM  
Result Value Ref Range aPTT 27.1 23.0 - 36.4 SEC PROTHROMBIN TIME + INR Collection Time: 10/22/19 10:55 AM  
Result Value Ref Range Prothrombin time 15.3 (H) 11.5 - 15.2 sec INR 1.2 0.8 - 1.2 Radiologic Studies -  
CT NECK SOFT TISSUE WO CONT Final Result IMPRESSION:  
1. No acute findings to explain patient's symptoms. 2.  Moderate to severe brain parenchymal volume loss. 3.  Mild sinonasal mucosal disease. 4.  Severe degenerative disc disease C3-7 with canal stenosis. 5.  Periapical abscesses and dental caries left lateral mandibular and maxillary  
molars. CT CHEST WO CONT Final Result IMPRESSION:  
  
1. No acute findings to explain patient's symptoms. Patent trachea and  
esophagus within limitations of distention. 2.  Borderline enlarged lower paraesophageal lymph node is nonspecific. 3.   Mild emphysema. 4.  Moderate colonic stool burden. 5.  Mild anasarca. CT Results  (Last 48 hours) 10/22/19 1138  CT NECK SOFT TISSUE WO CONT Final result Impression:  IMPRESSION:  
1. No acute findings to explain patient's symptoms. 2.  Moderate to severe brain parenchymal volume loss. 3.  Mild sinonasal mucosal disease. 4.  Severe degenerative disc disease C3-7 with canal stenosis. 5.  Periapical abscesses and dental caries left lateral mandibular and maxillary  
molars. Narrative:  CT OF THE NECK WITHOUT CONTRAST HISTORY:Grunting repeatedly as if clearing throat, history of dementia. COMPARISON:None TECHNIQUE:  A series of contiguous helical scans were performed through the neck  
without IV contrast . All CT scans at this facility are performed using dose optimization technique as  
appropriate to a performed exam, to include automated exposure control,  
adjustment of the mA and/or kV according to patient size (including appropriate  
matching first site-specific examinations), or use of iterative reconstruction  
technique. RESULT:  
   
Moderate to severe generalized parenchymal volume loss. Orbits and nasal cavity  
are normal. Valvula and epiglottis are normal. Thyroid gland is normal. Parotid  
and submandibular glands are normal. No lymphadenopathy. No fracture left lamina  
papyracea. Mild ethmoid and left maxillary sinus mucosal thickening. Mastoid air  
cells are patent. Severe degenerative disc disease C3-7. Canal stenosis at these  
levels. Facet hypertrophy. Prevertebral soft tissues are normal. Calcifications  
at the ears bilaterally. Mild anasarca. Periapical lucencies at the lateral most  
left maxillary and mandibular molars. Dental caries at the left mandibular  
molar. 10/22/19 1138  CT CHEST WO CONT Final result Impression:  IMPRESSION:  
   
1. No acute findings to explain patient's symptoms. Patent trachea and  
esophagus within limitations of distention. 2.  Borderline enlarged lower paraesophageal lymph node is nonspecific. 3.   Mild emphysema. 4.  Moderate colonic stool burden. 5.  Mild anasarca. Narrative:  CT CHEST WITHOUT ENHANCEMENT INDICATION: History of dementia, grunting repeatedly as if trying to clear her  
throat. TECHNIQUE: Axial images obtained from the thoracic inlet to the level of the  
diaphragm without intravenous contrast. Coronal and sagittal reformatted images  
were obtained. All CT scans at this facility are performed using dose optimization technique as  
appropriate to a performed exam, to include automated exposure control,  
adjustment of the mA and/or kV according to patient size (including appropriate  
matching first site-specific examinations), or use of iterative reconstruction  
technique. COMPARISON: None. CHEST FINDINGS:   
The evaluation of the mediastinum, hilum and vascular structures is limited in  
the absence of intravenous contrast.  
   
Thyroid: Unremarkable in its visualized aspects. Pericardium/ Heart: No significant effusion. Aorta/ Vessels: No aneurysm. Lymph Nodes: 1.1 cm precarinal lymph node (21). [place] Hind Lungs: Mild Paraseptal emphysematous changes upper lung zones. Trachea and  
central bronchial tree are patent. Pleura: No effusion. Upper Abdomen: The esophagus is patent within limitations of under distention. 1.1 cm lower paraesophageal lymph node (37). Moderate colonic stool burden. Bones/soft tissues: Mild anasarca. Degenerative changes bilateral glenohumeral  
joints. Degenerative disc disease. CXR Results  (Last 48 hours) None Medical Decision Making I am the first provider for this patient. I reviewed the vital signs, available nursing notes, past medical history, past surgical history, family history and social history. Vital Signs-Reviewed the patient's vital signs. Records Reviewed: Nursing Notes Procedures: 
EKG Date/Time: 10/22/2019 10:54 AM 
Performed by: LORI Mcgraw Authorized by: LORI Mcgraw Interpretation: Interpretation: normal   
Rate:  
  ECG rate:  79 ECG rate assessment: normal   
Rhythm:  
  Rhythm: sinus rhythm Ectopy:  
  Ectopy: none QRS:  
  QRS axis:  Normal 
  QRS intervals:  Normal 
Conduction:  
  Conduction: normal   
ST segments:   ST segments:  Normal 
T waves:  
  T waves: normal   
 
 
 
 Provider Notes (Medical Decision Making): The patient is completely symptom-free. There is nothing acute on his CT scans to explain his symptoms which have again resolved. We will treat his dental abscess with Augmentin and he can follow-up with his PCP and dental clinics. MED RECONCILIATION: 
No current facility-administered medications for this encounter. Current Outpatient Medications Medication Sig  
 amoxicillin-clavulanate (AUGMENTIN) 875-125 mg per tablet Take 1 Tab by mouth two (2) times a day for 10 days.  LORazepam (ATIVAN) 0.5 mg tablet Take 1-2 Tabs by mouth every eight (8) hours as needed for Anxiety (grunting). Max Daily Amount: 3 mg.  pantoprazole (PROTONIX) 40 mg tablet TAKE 1 TABLET BY MOUTH EVERY DAY  irbesartan (AVAPRO) 150 mg tablet TAKE 1 TABLET BY MOUTH EVERY DAY AT NIGHT  ALPRAZolam (XANAX) 0.25 mg tablet TAKE 1 TABLET BY MOUTH 3 TIMES DAILY AS NEEDED FOR ANXIETY.  QUEtiapine (SEROQUEL) 50 mg tablet TAKE 1 TABLET BY MOUTH EVERYDAY AT BEDTIME  amLODIPine (NORVASC) 10 mg tablet TAKE 1 TABLET BY MOUTH EVERY DAY  rosuvastatin (CRESTOR) 5 mg tablet TAKE 1 TABLET BY MOUTH AT BEDTIME IF NEEDED  
 escitalopram oxalate (LEXAPRO) 10 mg tablet TAKE 1 TABLET BY MOUTH EVERY DAY  memantine (NAMENDA) 10 mg tablet TAKE 1 TABLET BY MOUTH TWICE A DAY  ciclopirox (LOPROX) 0.77 % topical cream Apply pea size to affected area every day for 1 week at a time  metoprolol succinate (TOPROL-XL) 50 mg XL tablet TAKE 1 TABLET BY MOUTH ONCE DAILY  clotrimazole-betamethasone (LOTRISONE) topical cream Apply 2 g to affected area two (2) times a day. Indications: appply to affected skin bid and prn, apply pea size amount  cholecalciferol (VITAMIN D3) 1,000 unit tablet Take 1,000 Units by mouth daily.  donepezil (ARICEPT) 23 mg film coated tablet  aspirin 81 mg chewable tablet Take 1 Tab by mouth daily.  OTHER Check CBC, CMP, Mg in 5 days, results to PCP immediately, Diagnosis- HTN  
 multivitamin, tx-iron-ca-min (THERA-M W/ IRON) 9 mg iron-400 mcg tab tablet Take 1 Tab by mouth daily.  OTHER Compound Cream 
Gabapentin 6%, Baclofen 2%, Cyclobenzaprine 2%, Lidocaine 2%, Flurbiprofen 10% with Ketamine 10% Apply 1-2 grams (1-2 pumps) to affected area 3-4 times 120grams/2 refills Disposition: 
home DISCHARGE NOTE:  
12:16 PM 
 
Pt has been reexamined. Patient has no new complaints, changes, or physical findings. Care plan outlined and precautions discussed. Results of labs, CT were reviewed with the patient. All medications were reviewed with the patient; will d/c home with PPI, augmentin, ativan, protonix. All of pt's questions and concerns were addressed. Patient was instructed and agrees to follow up with dental, PCP, as well as to return to the ED upon further deterioration. Patient is ready to go home. Follow-up Information Follow up With Specialties Details Why Contact Info Marissa Deng MD Internal Medicine Schedule an appointment as soon as possible for a visit in 1 day  [address] [facility] [address] [facility] [id number] [phone] [id number] [facility] EMERGENCY DEPT Emergency Medicine  If symptoms worsen return immediately [address] [facility] [address] [facility] [address] [phone] [address]  Schedule an appointment as soon as possible for a visit today  [address] [facility] [address] 
[phone] Current Discharge Medication List  
  
START taking these medications Details  
amoxicillin-clavulanate (AUGMENTIN) 875-125 mg per tablet Take 1 Tab by mouth two (2) times a day for 10 days. Qty: 20 Tab, Refills: 0 LORazepam (ATIVAN) 0.5 mg tablet Take 1-2 Tabs by mouth every eight (8) hours as needed for Anxiety (grunting). Max Daily Amount: 3 mg. Qty: 20 Tab, Refills: 0 Associated Diagnoses: Anxiety CONTINUE these medications which have CHANGED Details  
pantoprazole (PROTONIX) 40 mg tablet TAKE 1 TABLET BY MOUTH EVERY DAY Qty: 90 Tab, Refills: 2 Diagnosis Clinical Impression: 1. Grunting respiration 2. Dental abscess 3. Anxiety

## 2019-10-30 NOTE — TELEPHONE ENCOUNTER
Talked to wife who is interested in LTC.  Not aware of any options for patient due ti his financial situation

## 2019-11-04 NOTE — PATIENT INSTRUCTIONS
Shortness of Breath: Care Instructions Your Care Instructions Shortness of breath has many causes. Sometimes conditions such as anxiety can lead to shortness of breath. Some people get mild shortness of breath when they exercise. Trouble breathing also can be a symptom of a serious problem, such as asthma, lung disease, emphysema, heart problems, and pneumonia. If your shortness of breath continues, you may need tests and treatment. Watch for any changes in your breathing and other symptoms. Follow-up care is a key part of your treatment and safety. Be sure to make and go to all appointments, and call your doctor if you are having problems. It's also a good idea to know your test results and keep a list of the medicines you take. How can you care for yourself at home? · Do not smoke or allow others to smoke around you. If you need help quitting, talk to your doctor about stop-smoking programs and medicines. These can increase your chances of quitting for good. · Get plenty of rest and sleep. · Take your medicines exactly as prescribed. Call your doctor if you think you are having a problem with your medicine. · Find healthy ways to deal with stress. ? Exercise daily. ? Get plenty of sleep. ? Eat regularly and well. When should you call for help? Call 911 anytime you think you may need emergency care. For example, call if: 
  · You have severe shortness of breath.  
  · You have symptoms of a heart attack. These may include: 
? Chest pain or pressure, or a strange feeling in the chest. 
? Sweating. ? Shortness of breath. ? Nausea or vomiting. ? Pain, pressure, or a strange feeling in the back, neck, jaw, or upper belly or in one or both shoulders or arms. ? Lightheadedness or sudden weakness. ? A fast or irregular heartbeat. After you call 911, the  may tell you to chew 1 adult-strength or 2 to 4 low-dose aspirin. Wait for an ambulance. Do not try to drive yourself.  Call your doctor now or seek immediate medical care if: 
  · Your shortness of breath gets worse or you start to wheeze. Wheezing is a high-pitched sound when you breathe.  
  · You wake up at night out of breath or have to prop your head up on several pillows to breathe.  
  · You are short of breath after only light activity or while at rest.  
 Watch closely for changes in your health, and be sure to contact your doctor if: 
  · You do not get better over the next 1 to 2 days. Where can you learn more? Go to http://christie-brigid.info/. Enter S780 in the search box to learn more about \"Shortness of Breath: Care Instructions. \" Current as of: June 9, 2019 Content Version: 12.2 © 7863-5182 [employer]. Care instructions adapted under license by [employer] (which disclaims liability or warranty for this information). If you have questions about a medical condition or this instruction, always ask your healthcare professional. [address] any warranty or liability for your use of this information.

## 2019-11-04 NOTE — PROGRESS NOTES
Katey Mcallister. is a 78 y.o.  male and presents with ED Follow-up; Dysphagia; Dementia; and Hypertension SUBJECTIVE: 
 
Patient comes in today with his wife who is taking care of him. Patient has severe dementia and when he was eaten at home wife with noticed that he would choke if he ate something that needed a lot of chewing such as meat. Patient because of his dementia does not chew his food significantly. Patient went to the emergency room and had a CT of neck and chest.  It was significant only for some infected teeth for which patient was given Augmentin in the emergency room. At this point patient's wife is going to avoid meat and trying to cut up his food as much as possible. If he continues to have issues with choking will need to refer to GI and possibly speech therapy for modified barium swallow. Patient's wife who is being treated for recurrent breast cancer is having significant difficulties taking care of Mr. Enedina Petty at home but is unable to afford long-term care facility. Patient's income does not allow him to get Medicaid. She has an aide and I will give her information on how to get another aide if it is in her budget. Respiratory ROS: negative for - shortness of breath Cardiovascular ROS: negative for - chest pain Current Outpatient Medications Medication Sig  
 amoxicillin-clavulanate (AUGMENTIN) 875-125 mg per tablet Take  by mouth every twelve (12) hours.  LORazepam (ATIVAN) 0.5 mg tablet Take  by mouth.  ALPRAZolam (XANAX) 0.25 mg tablet TAKE 1 TABLET BY MOUTH THREE TIMES A DAY AS NEEDED  pantoprazole (PROTONIX) 40 mg tablet TAKE 1 TABLET BY MOUTH EVERY DAY  irbesartan (AVAPRO) 150 mg tablet TAKE 1 TABLET BY MOUTH EVERY DAY AT NIGHT  QUEtiapine (SEROQUEL) 50 mg tablet TAKE 1 TABLET BY MOUTH EVERYDAY AT BEDTIME  amLODIPine (NORVASC) 10 mg tablet TAKE 1 TABLET BY MOUTH EVERY DAY  
  rosuvastatin (CRESTOR) 5 mg tablet TAKE 1 TABLET BY MOUTH AT BEDTIME IF NEEDED  
 escitalopram oxalate (LEXAPRO) 10 mg tablet TAKE 1 TABLET BY MOUTH EVERY DAY  memantine (NAMENDA) 10 mg tablet TAKE 1 TABLET BY MOUTH TWICE A DAY  ciclopirox (LOPROX) 0.77 % topical cream Apply pea size to affected area every day for 1 week at a time  metoprolol succinate (TOPROL-XL) 50 mg XL tablet TAKE 1 TABLET BY MOUTH ONCE DAILY  clotrimazole-betamethasone (LOTRISONE) topical cream Apply 2 g to affected area two (2) times a day. Indications: appply to affected skin bid and prn, apply pea size amount  cholecalciferol (VITAMIN D3) 1,000 unit tablet Take 1,000 Units by mouth daily.  donepezil (ARICEPT) 23 mg film coated tablet  aspirin 81 mg chewable tablet Take 1 Tab by mouth daily.  OTHER Check CBC, CMP, Mg in 5 days, results to PCP immediately, Diagnosis- HTN  
 multivitamin, tx-iron-ca-min (THERA-M W/ IRON) 9 mg iron-400 mcg tab tablet Take 1 Tab by mouth daily.  OTHER Compound Cream 
Gabapentin 6%, Baclofen 2%, Cyclobenzaprine 2%, Lidocaine 2%, Flurbiprofen 10% with Ketamine 10% Apply 1-2 grams (1-2 pumps) to affected area 3-4 times 120grams/2 refills No current facility-administered medications for this visit. OBJECTIVE: 
alert, well appearing, and in no distress Visit Vitals /67 (BP 1 Location: Left arm, BP Patient Position: Sitting) Pulse 88 Temp 98.9 °F (37.2 °C) (Oral) Resp 18 Ht 5' 9\" (1.753 m) Wt 259 lb (117.5 kg) SpO2 95% BMI 38.25 kg/m²  
  
well developed and well nourished Chest - clear to auscultation, no wheezes, rales or rhonchi, symmetric air entry Heart - normal rate, regular rhythm, normal S1, S2, no murmurs, rubs, clicks or gallops Abdomen - soft, nontender, nondistended, no masses or organomegaly Labs:  
Lab Results Component Value Date/Time  WBC 5.2 10/22/2019 10:55 AM  
 HGB 13.9 10/22/2019 10:55 AM  
 HCT 42.1 10/22/2019 10:55 AM  
 PLATELET 106 72/46/2920 10:55 AM  
 MCV 91.7 10/22/2019 10:55 AM  
 
 
Assessment/Plan ICD-10-CM ICD-9-CM 1. Oropharyngeal dysphagia R13.12 787.22  the patient's wife will avoid meat for now and cut up his food but if symptoms continue will refer to GI/speech therapy 2. Late onset Alzheimer's disease with behavioral disturbance ([facility] [address] G30.1 331.0  patient continues to progress and needs long-term care facility. F02.81 294.11   
3. Anxiety F41.9 300.00  stable on current medications 4. HTN (hypertension), benign I10 401.1  well-controlled on current medicines 5. Type 2 diabetes with nephropathy (HCC) E11.21 250.40  well-controlled off medication 583.81 Follow-up and Dispositions · Return if symptoms worsen or fail to improve. Reviewed plan of care. Patient has provided input and agrees with goals.

## 2019-11-04 NOTE — TELEPHONE ENCOUNTER
Pt came into the office today and he forgot to tell you that he suffers from abdominal pain and they want a mri please advise or do he need to make another appt please advise [phone]

## 2019-11-04 NOTE — PROGRESS NOTES
Patient is in the office today for ED follow up. 1. Have you been to the ER, urgent care clinic since your last visit? Hospitalized since your last visit? [facility] for 10/22/19 SOB 2. Have you seen or consulted any other health care providers outside of the [address] [place] [address] since your last visit? Include any pap smears or colon screening.  No

## 2019-11-05 NOTE — TELEPHONE ENCOUNTER
Pt with c/o abdominal pain after he eats. Will increase protonix 40 mg to BID and if refer to GI. Pt wife is aware of the instructions and that the referral will be placed.

## 2019-11-20 NOTE — TELEPHONE ENCOUNTER
Requested Prescriptions     Pending Prescriptions Disp Refills    clotrimazole-betamethasone (LOTRISONE) topical cream 15 g . refill     Sig: Apply 2 g to affected area two (2) times a day.  Indications: appply to affected skin bid and prn, apply pea size amount

## 2019-12-02 NOTE — TELEPHONE ENCOUNTER
Pt's wife request return call re: form completion.  She dropped of respite form ([facility]) last week and needs them back as soon as possible

## 2019-12-04 NOTE — TELEPHONE ENCOUNTER
Spouse called with  on phone to check on status of forms waiting to be received to have patient set up for placement into a memory unit.  Please adv [phone]

## 2019-12-18 NOTE — DISCHARGE INSTRUCTIONS
Patient Education        Alzheimer's Disease: Care Instructions  Your Care Instructions    Alzheimer's disease is a type of dementia. It causes memory loss and affects judgment, language, and behavior. You may have trouble making decisions or may get lost in places that you used to know well. Alzheimer's disease is different than mild memory loss that occurs with aging. It is not clear what causes Alzheimer's disease, but it is the most common form of dementia in older adults. Finding out that you have this disease is a shock. You may be afraid and worried about how the condition will change your life. Although there is no cure at this time, medicine in some cases may slow memory loss for a while. Other medicines may be able to help you sleep or cope with depression and behavior changes. Alzheimer's disease is different for everyone. It may take many years to develop. In some cases, people can function well for a long time. In the early stage of the disease, you can do things at home to make life easier and safer. You also can keep doing your hobbies and other activities. Many people find comfort in planning now for their future needs. Follow-up care is a key part of your treatment and safety. Be sure to make and go to all appointments, and call your doctor if you are having problems. It's also a good idea to know your test results and keep a list of the medicines you take. How can you care for yourself at home? Taking care of yourself  · If your doctor gives you medicines, take them exactly as prescribed. Call your doctor if you think you are having a problem with your medicine. You will get more details on the medicines your doctor prescribes. · Eat a balanced diet. Get plenty of whole grains, fruits, and vegetables every day. If you are not hungry at mealtimes, eat snacks at midmorning and in the afternoon.  Try drinks such as Boost, Ensure, or Sustacal if you are having trouble keeping your weight up.  · Stay active. Exercise such as walking may slow the decline of your mental abilities. Try to stay active mentally too. Read and work crossword puzzles if you enjoy these activities. · If you have trouble sleeping, do not nap during the day. Get regular exercise (but not within several hours of bedtime). Drink a glass of warm milk or caffeine-free herbal tea before going to bed. · Ask your doctor about support groups and other resources in your area. They can help people who have Alzheimer's disease and their families. · Be patient. You may find that a task takes you longer than it used to. · If you have not already done so, make a list of advance directives. Advance directives are instructions to your doctor and family members about what kind of care you want if you become unable to speak or express yourself. Talk to a  about making a will, if you do not already have one. Keeping schedules  · Develop a routine. You will feel less frustrated or confused if you have a clear, simple plan of what to do every day. ? Make lists of your medicines and when to take them. ? Write down appointments and other tasks in a calendar. ? Put sticky notes around the house to help you remember events and other things you have to do. ? Schedule activities and tasks for times of the day when you are best able to handle them. Staying safe  · Tell someone when you are going out and where you are going. Let the person know when you will be back. Before you go out alone, write down where you are going, how to get there, and how to get back home. Do this even if you have gone there many times before. Take someone along with you when possible. · Make your home safe. Tack down rugs, put no-slip tape in the tub, use handrails, and put safety switches on stoves and appliances. · Have a family member or other caregiver tell you whether you are driving badly. Deciding to stop driving is very hard for many people.  Driving helps you feel independent. Your state 's license bureau can do a driving test if there is any question. Plan for other means of getting around when you are no longer able to drive. · Use strong lighting, especially at night. Put night-lights in bedrooms, hallways, and bathrooms. · Lower the hot water temperature setting to 120°F or lower to avoid burns. When should you call for help? Call 911 anytime you think you may need emergency care. For example, call if:    · You are lost and do not know whom to call.     · You are injured and do not know whom to call.    Call your doctor now or seek immediate medical care if:    · Your symptoms suddenly get much worse.    Watch closely for changes in your health, and be sure to contact your doctor if:    · You want more information about how you can take care of yourself. Where can you learn more? Go to http://christie-brigid.info/. Enter Y179 in the search box to learn more about \"Alzheimer's Disease: Care Instructions. \"  Current as of: May 28, 2019  Content Version: 12.2  © 2927-8402 [employer]. Care instructions adapted under license by [employer] (which disclaims liability or warranty for this information). If you have questions about a medical condition or this instruction, always ask your healthcare professional. [place] [address] any warranty or liability for your use of this information. Patient Education        Chest Pain: Care Instructions  Your Care Instructions    There are many things that can cause chest pain. Some are not serious and will get better on their own in a few days. But some kinds of chest pain need more testing and treatment. Your doctor may have recommended a follow-up visit in the next 8 to 12 hours. If you are not getting better, you may need more tests or treatment. Even though your doctor has released you, you still need to watch for any problems.  The doctor carefully checked you, but sometimes problems can develop later. If you have new symptoms or if your symptoms do not get better, get medical care right away. If you have worse or different chest pain or pressure that lasts more than 5 minutes or you passed out (lost consciousness), call 911 or seek other emergency help right away. A medical visit is only one step in your treatment. Even if you feel better, you still need to do what your doctor recommends, such as going to all suggested follow-up appointments and taking medicines exactly as directed. This will help you recover and help prevent future problems. How can you care for yourself at home? · Rest until you feel better. · Take your medicine exactly as prescribed. Call your doctor if you think you are having a problem with your medicine. · Do not drive after taking a prescription pain medicine. When should you call for help? Call 911 if:    · You passed out (lost consciousness).     · You have severe difficulty breathing.     · You have symptoms of a heart attack. These may include:  ? Chest pain or pressure, or a strange feeling in your chest.  ? Sweating. ? Shortness of breath. ? Nausea or vomiting. ? Pain, pressure, or a strange feeling in your back, neck, jaw, or upper belly or in one or both shoulders or arms. ? Lightheadedness or sudden weakness. ? A fast or irregular heartbeat. After you call 911, the  may tell you to chew 1 adult-strength or 2 to 4 low-dose aspirin. Wait for an ambulance. Do not try to drive yourself.    Call your doctor today if:    · You have any trouble breathing.     · Your chest pain gets worse.     · You are dizzy or lightheaded, or you feel like you may faint.     · You are not getting better as expected.     · You are having new or different chest pain. Where can you learn more? Go to http://christie-brigid.info/.   Enter A120 in the search box to learn more about \"Chest Pain: Care Instructions. \"  Current as of: June 26, 2019  Content Version: 12.2  © 6099-2627 [employer]. Care instructions adapted under license by [employer] (which disclaims liability or warranty for this information). If you have questions about a medical condition or this instruction, always ask your healthcare professional. [place] [address] any warranty or liability for your use of this information.

## 2019-12-18 NOTE — ED TRIAGE NOTES
Patient brought via EMS from [facility]. They reported that patient had chest pain. Upon their arrival, patient was pointing toward his throat. On exam, lungs and airway are clear.

## 2019-12-18 NOTE — ED PROVIDER NOTES
EMERGENCY DEPARTMENT HISTORY AND PHYSICAL EXAM 
 
2:31 PM 
 
 
Date: 12/18/2019 Patient Name: Gio Hernandez. 
 
History of Presenting Illness Chief Complaint Patient presents with  Chest Pain History Provided By: Patient and Patient's Wife Additional History (Context): Josh Venegas Sr. is a 78 y.o. male with Past medical history of anxiety, bladder cancer, dementia, hypertension who presents with chief complaint chest pain initially per EMS. Initial call to EMS was for chest pain. When patient arrived to the emergency department he states that there is no chest pain but that there was some pain in his facial region but that is resolved now, and he states he feels much better. Denies shortness of breath, cough, chills, body aches, neck pain, dizziness, abdominal pain, nausea, sweating and no other complaint. PCP: Verna Piedra MD 
 
 
 
Past History Past Medical History: 
Past Medical History:  
Diagnosis Date  Anxiety  Arthritis  Cancer [facility]) 2009  
 bladder  Carotid duplex 05/26/2011 No occlusive disease >49% bilaterally.  Chronic back pain 5/6/2010  Chronic traumatic encephalopathy  Dementia ([address]  Depression   
 he has taken Xanax for 16 years for this  Diabetes ([address]  Dizziness  ED (erectile dysfunction)  Fibrosis of knee joint Peripatellar, right  Headache(784.0)  HTN (hypertension), benign 4/1/2010  Late onset Alzheimer's disease with behavioral disturbance ([address]  Left wrist pain  Lumbar spinal stenosis 9/25/2010  Neuropathy of lower extremity  Obesity  Osteoarthritis of both knees  Sleep apnea   
 cpap use  SOB (shortness of breath)  Vertigo, benign positional   
 
 
Past Surgical History: 
Past Surgical History:  
Procedure Laterality Date  BIOPSY  12/19/2007  HX OTHER SURGICAL  11/2014  
 right knee  HX UROLOGICAL Bladder CA Family History: Family History Problem Relation Age of Onset  Hypertension Mother  Heart Disease Mother [facility] Other Mother Alzheimer's disease  Diabetes Neg Hx Social History: 
Social History Tobacco Use  Smoking status: Former Smoker  Smokeless tobacco: Never Used  Tobacco comment: quit smoking in 2000 Substance Use Topics  Alcohol use: No  
  Alcohol/week: 0.0 standard drinks  Drug use: No  
 
 
Allergies: Allergies Allergen Reactions  Chlorhexidine Towelette Itching Review of Systems Review of Systems Constitutional: Negative for chills and fever. HENT: Negative for congestion, rhinorrhea, sore throat and trouble swallowing. Eyes: Negative for visual disturbance. Respiratory: Negative for cough, shortness of breath and wheezing. Cardiovascular: Negative for leg swelling. Initial call to EMS was for chest pain. On arrival to ED patient denies any chest pain. Patient states has some neck pain but states that is resolved. Gastrointestinal: Negative for abdominal pain, nausea and vomiting. Endocrine: Negative for polyuria. Genitourinary: Negative for difficulty urinating and dysuria. Musculoskeletal: Negative for arthralgias, back pain, myalgias and neck stiffness. Skin: Negative for rash. Neurological: Negative for dizziness, weakness, numbness and headaches. Hematological: Does not bruise/bleed easily. Psychiatric/Behavioral: Negative for dysphoric mood. History of dementia All other systems reviewed and are negative. Physical Exam  
 
Visit Vitals /79 Pulse 70 Temp 97.8 °F (36.6 °C) Resp 16 SpO2 96% Physical Exam 
Vitals signs and nursing note reviewed. Constitutional:   
   General: He is not in acute distress. Appearance: He is well-developed. He is not ill-appearing, toxic-appearing or diaphoretic. HENT:  
   Head: Normocephalic and atraumatic.   
   Nose: Nose normal.  
 Mouth/Throat:  
   Mouth: Mucous membranes are moist.  
Eyes:  
   General: No scleral icterus. Conjunctiva/sclera: Conjunctivae normal.  
   Pupils: Pupils are equal, round, and reactive to light. Neck: Musculoskeletal: Normal range of motion and neck supple. Cardiovascular:  
   Rate and Rhythm: Normal rate. Heart sounds: Normal heart sounds. No friction rub. No gallop. Comments: Capillary refill < 3 seconds Pulmonary:  
   Effort: Pulmonary effort is normal. No respiratory distress. Breath sounds: Normal breath sounds. No stridor. No decreased breath sounds, wheezing, rhonchi or rales. Chest:  
   Chest wall: No tenderness. Abdominal:  
   General: Bowel sounds are normal. There is no distension. Palpations: Abdomen is soft. Tenderness: There is no tenderness. Musculoskeletal: Normal range of motion. Lymphadenopathy:  
   Cervical: No cervical adenopathy. Skin: 
   General: Skin is warm and dry. Coloration: Skin is not pale. Neurological:  
   Mental Status: He is alert and oriented to person, place, and time. Cranial Nerves: No cranial nerve deficit. Sensory: No sensory deficit. Motor: No weakness. Coordination: Coordination normal.  
Psychiatric:  
   Comments: intermittent confusion pleasantly, with history of dementia. At baseline Wife at the bedside Diagnostic Study Results Labs - Recent Results (from the past 12 hour(s)) EKG, 12 LEAD, INITIAL Collection Time: 12/18/19  2:52 PM  
Result Value Ref Range Ventricular Rate 69 BPM  
 Atrial Rate 69 BPM  
 P-R Interval 178 ms QRS Duration 80 ms  
 Q-T Interval 426 ms  
 QTC Calculation (Bezet) 456 ms Calculated P Axis 19 degrees Calculated R Axis 18 degrees Calculated T Axis -13 degrees Diagnosis Normal sinus rhythm T wave abnormality, consider inferior ischemia T wave abnormality, consider anterior ischemia Abnormal ECG 
 When compared with ECG of 22-OCT-2019 10:54, 
T wave inversion now evident in Inferior leads T wave inversion now evident in Anterior leads CBC WITH AUTOMATED DIFF Collection Time: 12/18/19  3:00 PM  
Result Value Ref Range WBC 7.3 4.6 - 13.2 K/uL  
 RBC 4.51 (L) 4.70 - 5.50 M/uL  
 HGB 13.6 13.0 - 16.0 g/dL HCT 41.4 36.0 - 48.0 % MCV 91.8 74.0 - 97.0 FL  
 MCH 30.2 24.0 - 34.0 PG  
 MCHC 32.9 31.0 - 37.0 g/dL  
 RDW 13.8 11.6 - 14.5 % PLATELET 247 129 - 716 K/uL MPV 10.4 9.2 - 11.8 FL  
 NEUTROPHILS 71 40 - 73 % LYMPHOCYTES 19 (L) 21 - 52 % MONOCYTES 9 3 - 10 % EOSINOPHILS 1 0 - 5 % BASOPHILS 0 0 - 2 %  
 ABS. NEUTROPHILS 5.1 1.8 - 8.0 K/UL  
 ABS. LYMPHOCYTES 1.4 0.9 - 3.6 K/UL  
 ABS. MONOCYTES 0.7 0.05 - 1.2 K/UL  
 ABS. EOSINOPHILS 0.1 0.0 - 0.4 K/UL  
 ABS. BASOPHILS 0.0 0.0 - 0.1 K/UL  
 DF AUTOMATED METABOLIC PANEL, BASIC Collection Time: 12/18/19  3:00 PM  
Result Value Ref Range Sodium 145 136 - 145 mmol/L Potassium 3.2 (L) 3.5 - 5.5 mmol/L Chloride 107 100 - 111 mmol/L  
 CO2 31 21 - 32 mmol/L Anion gap 7 3.0 - 18 mmol/L Glucose 112 (H) 74 - 99 mg/dL BUN 17 7.0 - 18 MG/DL Creatinine 1.20 0.6 - 1.3 MG/DL  
 BUN/Creatinine ratio 14 12 - 20 GFR est AA >60 >60 ml/min/1.73m2 GFR est non-AA 58 (L) >60 ml/min/1.73m2 Calcium 8.6 8.5 - 10.1 MG/DL  
CARDIAC PANEL,(CK, CKMB & TROPONIN) Collection Time: 12/18/19  3:00 PM  
Result Value Ref Range  (H) 39 - 308 U/L  
 CK - MB 8.7 (H) <3.6 ng/ml CK-MB Index 1.1 0.0 - 4.0 % Troponin-I, QT <0.02 0.0 - 0.045 NG/ML  
CARDIAC PANEL,(CK, CKMB & TROPONIN) Collection Time: 12/18/19  5:32 PM  
Result Value Ref Range  (H) 39 - 308 U/L  
 CK - MB 7.5 (H) <3.6 ng/ml CK-MB Index 1.0 0.0 - 4.0 % Troponin-I, QT <0.02 0.0 - 0.045 NG/ML Radiologic Studies - No orders to display Medical Decision Making I am the first provider for this patient. I reviewed the vital signs, available nursing notes, past medical history, past surgical history, family history and social history. Vital Signs-Reviewed the patient's vital signs. EKG: Interpreted by the EP Dr. Melecio Aguilar. Time Interpreted: 2:52 PM 
 Rate: 69 Rhythm: Normal Sinus Rhythm Interpretation: Normal QRS duration, normal axis, no ST elevations, there are T wave inversions in leads III, V4, V3 V4 and V5 Comparison: Exact same T wave inversions on EKG March 30, 2016 Records Reviewed: Nursing Notes and Old Medical Records (Time of Review: 2:31 PM) Provider Notes (Medical Decision Making): DDX: Cardiac, muscular, anxiety, metabolic, history of dementia Check labs, chest x-ray, EKG 
MDM Medications  
potassium bicarb-citric acid (EFFER-K) tablet 20 mEq (20 mEq Oral Given 12/18/19 1613) ED Course: Progress Notes, Reevaluation, and Consults: WBC within normal limits Potassium 3.2, repleted with p.o. potassium Initial troponin less than 0.02 Reassessed patient and still has no new complaint no chest pain. Repeat troponin remains less than 0.02 I have reassessed the patient. I have discussed the workup, results and plan with the patient and patient is in agreement. Patient is feeling better. Patient was discharge in stable condition. Patient was given outpatient follow up. Patient is to return to emergency department if any new or worsening condition. Diagnosis Clinical Impression: 1. Dementia with behavioral disturbance, unspecified dementia type ([address] 2. Atypical chest pain 3. Hypokalemia Disposition: Discharged Follow-up Information Follow up With Specialties Details Why Contact Info Yassine Muller MD Internal Medicine Schedule an appointment as soon as possible for a visit in 2 days  [address] [facility] [address] [place] [id number] 
[phone] [id number] [facility] EMERGENCY DEPT Emergency Medicine  As needed, If symptoms worsen [address] [facility] [id number] [phone] Discharge Medication List as of 12/18/2019  6:46 PM  
  
CONTINUE these medications which have NOT CHANGED Details  
rosuvastatin (CRESTOR) 5 mg tablet TAKE 1 TABLET BY MOUTH AT BEDTIME IF NEEDED, Normal, Disp-90 Tab, R-1  
  
clotrimazole-betamethasone (LOTRISONE) topical cream Apply 2 g to affected area two (2) times a day. Indications: appply to affected skin bid and prn, apply pea size amount, Normal, Disp-30 g, R-2  
  
amoxicillin-clavulanate (AUGMENTIN) 875-125 mg per tablet Take  by mouth every twelve (12) hours. , Historical Med LORazepam (ATIVAN) 0.5 mg tablet Take  by mouth., Historical Med ALPRAZolam (XANAX) 0.25 mg tablet TAKE 1 TABLET BY MOUTH THREE TIMES A DAY AS NEEDED, NormalNot to exceed 3 additional fills before 01/19/2020Disp-90 Tab, R-2  
  
pantoprazole (PROTONIX) 40 mg tablet TAKE 1 TABLET BY MOUTH EVERY DAY, Print, Disp-90 Tab, R-2  
  
irbesartan (AVAPRO) 150 mg tablet TAKE 1 TABLET BY MOUTH EVERY DAY AT NIGHT, Normal, Disp-90 Tab, R-3  
  
QUEtiapine (SEROQUEL) 50 mg tablet TAKE 1 TABLET BY MOUTH EVERYDAY AT BEDTIME, Normal, Disp-90 Tab, R-2  
  
amLODIPine (NORVASC) 10 mg tablet TAKE 1 TABLET BY MOUTH EVERY DAY, Normal, Disp-30 Tab, R-5  
  
escitalopram oxalate (LEXAPRO) 10 mg tablet TAKE 1 TABLET BY MOUTH EVERY DAY, Normal, Disp-90 Tab, R-2  
  
memantine (NAMENDA) 10 mg tablet TAKE 1 TABLET BY MOUTH TWICE A DAY, Normal, Disp-180 Tab, R-2  
  
ciclopirox (LOPROX) 0.77 % topical cream Apply pea size to affected area every day for 1 week at a time, Normal, Disp-90 g, R-2  
  
metoprolol succinate (TOPROL-XL) 50 mg XL tablet TAKE 1 TABLET BY MOUTH ONCE DAILY, Normal, Disp-90 Tab, R-1  
  
cholecalciferol (VITAMIN D3) 1,000 unit tablet Take 1,000 Units by mouth daily. , Historical Med  
  
donepezil (ARICEPT) 23 mg film coated tablet Historical Med, R-0  
  
 aspirin 81 mg chewable tablet Take 1 Tab by mouth daily. , Print, Disp-30 Tab, R-0  
  
!! OTHER Check CBC, CMP, Mg in 5 days, results to PCP immediately, Diagnosis- HTN, Print, Disp-1 Each, R-0  
  
multivitamin, tx-iron-ca-min (THERA-M W/ IRON) 9 mg iron-400 mcg tab tablet Take 1 Tab by mouth daily. , Historical Med  
  
!! OTHER Compound Cream 
Gabapentin 6%, Baclofen 2%, Cyclobenzaprine 2%, Lidocaine 2%, Flurbiprofen 10% with Ketamine 10% Apply 1-2 grams (1-2 pumps) to affected area 3-4 times 120grams/2 refills, Historical Med  
  
 !! - Potential duplicate medications found. Please discuss with provider. Georgie Oh DO 
 
Dragon medical dictation software was used for portions of this report. Unintended transcription errors may occur. My signature above authenticates this document and my orders, the final   
diagnosis (es), discharge prescription (s), and instructions in the Epic   
record.

## 2019-12-19 NOTE — ED NOTES
Patient armband removed and shreddedI have reviewed discharge instructions with the spouse. The spouse verbalized understanding. Pt back to [facility] via [address] transport stretcher.

## 2019-12-19 NOTE — ED NOTES
Pt incontinent of urine, removed pt pants, adult brief saturated in urine soaked through to sheets, pt stood at bedside wobbly assisted by wife and this nurse, bedding changes, pt wiped himself with wipes provided this nurse wiped pt from behind, adult brief placed on pt with assist by this nurse and his wife, shirt placed back on, pt back to stretcher, now is aggitated because he wants to leave, updated waiting on transport and discharge; wife is assisting with keeping pt calm, she gave him peanut butter crackers.

## 2020-01-01 ENCOUNTER — TELEPHONE (OUTPATIENT)
Dept: FAMILY MEDICINE CLINIC | Age: 80
End: 2020-01-01

## 2020-01-01 ENCOUNTER — APPOINTMENT (OUTPATIENT)
Dept: GENERAL RADIOLOGY | Age: 80
DRG: 177 | End: 2020-01-01
Attending: FAMILY MEDICINE
Payer: MEDICARE

## 2020-01-01 ENCOUNTER — APPOINTMENT (OUTPATIENT)
Dept: GENERAL RADIOLOGY | Age: 80
DRG: 177 | End: 2020-01-01
Attending: INTERNAL MEDICINE
Payer: MEDICARE

## 2020-01-01 ENCOUNTER — HOSPICE ADMISSION (OUTPATIENT)
Dept: HOSPICE | Facility: HOSPICE | Age: 80
End: 2020-01-01

## 2020-01-01 ENCOUNTER — APPOINTMENT (OUTPATIENT)
Dept: GENERAL RADIOLOGY | Age: 80
DRG: 177 | End: 2020-01-01
Attending: EMERGENCY MEDICINE
Payer: MEDICARE

## 2020-01-01 ENCOUNTER — HOSPITAL ENCOUNTER (INPATIENT)
Age: 80
LOS: 7 days | DRG: 177 | End: 2020-01-13
Attending: EMERGENCY MEDICINE | Admitting: INTERNAL MEDICINE
Payer: MEDICARE

## 2020-01-01 ENCOUNTER — APPOINTMENT (OUTPATIENT)
Dept: CT IMAGING | Age: 80
DRG: 177 | End: 2020-01-01
Attending: EMERGENCY MEDICINE
Payer: MEDICARE

## 2020-01-01 VITALS
HEIGHT: 69 IN | BODY MASS INDEX: 37.03 KG/M2 | TEMPERATURE: 102.7 F | OXYGEN SATURATION: 78 % | RESPIRATION RATE: 20 BRPM | DIASTOLIC BLOOD PRESSURE: 64 MMHG | WEIGHT: 250 LBS | SYSTOLIC BLOOD PRESSURE: 131 MMHG | HEART RATE: 109 BPM

## 2020-01-01 DIAGNOSIS — J96.01 ACUTE RESPIRATORY FAILURE WITH HYPOXIA (HCC): Primary | ICD-10-CM

## 2020-01-01 LAB
ALBUMIN SERPL-MCNC: 3.4 G/DL (ref 3.4–5)
ALBUMIN SERPL-MCNC: 3.6 G/DL (ref 3.4–5)
ALBUMIN/GLOB SERPL: 0.7 {RATIO} (ref 0.8–1.7)
ALBUMIN/GLOB SERPL: 0.9 {RATIO} (ref 0.8–1.7)
ALP SERPL-CCNC: 89 U/L (ref 45–117)
ALP SERPL-CCNC: 99 U/L (ref 45–117)
ALT SERPL-CCNC: 29 U/L (ref 16–61)
ALT SERPL-CCNC: 32 U/L (ref 16–61)
ANION GAP SERPL CALC-SCNC: 5 MMOL/L (ref 3–18)
ANION GAP SERPL CALC-SCNC: 7 MMOL/L (ref 3–18)
ANION GAP SERPL CALC-SCNC: 7 MMOL/L (ref 3–18)
ANION GAP SERPL CALC-SCNC: 9 MMOL/L (ref 3–18)
ANION GAP SERPL CALC-SCNC: 9 MMOL/L (ref 3–18)
APPEARANCE UR: ABNORMAL
APPEARANCE UR: CLEAR
APTT PPP: 25.3 SEC (ref 23–36.4)
ARTERIAL PATENCY WRIST A: YES
AST SERPL-CCNC: 36 U/L (ref 10–38)
AST SERPL-CCNC: 42 U/L (ref 10–38)
ATRIAL RATE: 61 BPM
ATRIAL RATE: 68 BPM
BACTERIA SPEC CULT: NORMAL
BACTERIA SPEC CULT: NORMAL
BACTERIA URNS QL MICRO: NEGATIVE /HPF
BASE EXCESS BLD CALC-SCNC: 3 MMOL/L
BASOPHILS # BLD: 0 K/UL (ref 0–0.1)
BASOPHILS NFR BLD: 0 % (ref 0–2)
BDY SITE: ABNORMAL
BILIRUB SERPL-MCNC: 0.5 MG/DL (ref 0.2–1)
BILIRUB SERPL-MCNC: 1 MG/DL (ref 0.2–1)
BILIRUB UR QL: ABNORMAL
BILIRUB UR QL: NEGATIVE
BNP SERPL-MCNC: 49 PG/ML (ref 0–1800)
BODY TEMPERATURE: 97.5
BUN SERPL-MCNC: 11 MG/DL (ref 7–18)
BUN SERPL-MCNC: 14 MG/DL (ref 7–18)
BUN SERPL-MCNC: 17 MG/DL (ref 7–18)
BUN SERPL-MCNC: 18 MG/DL (ref 7–18)
BUN SERPL-MCNC: 20 MG/DL (ref 7–18)
BUN/CREAT SERPL: 13 (ref 12–20)
BUN/CREAT SERPL: 13 (ref 12–20)
BUN/CREAT SERPL: 14 (ref 12–20)
BUN/CREAT SERPL: 8 (ref 12–20)
BUN/CREAT SERPL: 9 (ref 12–20)
CALCIUM SERPL-MCNC: 8.1 MG/DL (ref 8.5–10.1)
CALCIUM SERPL-MCNC: 8.1 MG/DL (ref 8.5–10.1)
CALCIUM SERPL-MCNC: 8.3 MG/DL (ref 8.5–10.1)
CALCIUM SERPL-MCNC: 8.7 MG/DL (ref 8.5–10.1)
CALCIUM SERPL-MCNC: 9.2 MG/DL (ref 8.5–10.1)
CALCULATED P AXIS, ECG09: 18 DEGREES
CALCULATED P AXIS, ECG09: 52 DEGREES
CALCULATED R AXIS, ECG10: 12 DEGREES
CALCULATED R AXIS, ECG10: 24 DEGREES
CALCULATED T AXIS, ECG11: -33 DEGREES
CALCULATED T AXIS, ECG11: 20 DEGREES
CHLORIDE SERPL-SCNC: 109 MMOL/L (ref 100–111)
CHLORIDE SERPL-SCNC: 112 MMOL/L (ref 100–111)
CHLORIDE SERPL-SCNC: 112 MMOL/L (ref 100–111)
CHLORIDE SERPL-SCNC: 113 MMOL/L (ref 100–111)
CHLORIDE SERPL-SCNC: 117 MMOL/L (ref 100–111)
CK MB CFR SERPL CALC: 0.4 % (ref 0–4)
CK MB CFR SERPL CALC: 0.5 % (ref 0–4)
CK MB CFR SERPL CALC: 0.9 % (ref 0–4)
CK MB SERPL-MCNC: 2 NG/ML (ref 5–25)
CK MB SERPL-MCNC: 2.5 NG/ML (ref 5–25)
CK MB SERPL-MCNC: 4.9 NG/ML (ref 5–25)
CK SERPL-CCNC: 425 U/L (ref 39–308)
CK SERPL-CCNC: 555 U/L (ref 39–308)
CK SERPL-CCNC: 640 U/L (ref 39–308)
CO2 SERPL-SCNC: 26 MMOL/L (ref 21–32)
CO2 SERPL-SCNC: 30 MMOL/L (ref 21–32)
CO2 SERPL-SCNC: 30 MMOL/L (ref 21–32)
COLOR UR: ABNORMAL
COLOR UR: YELLOW
CREAT SERPL-MCNC: 1.3 MG/DL (ref 0.6–1.3)
CREAT SERPL-MCNC: 1.38 MG/DL (ref 0.6–1.3)
CREAT SERPL-MCNC: 1.39 MG/DL (ref 0.6–1.3)
CREAT SERPL-MCNC: 1.39 MG/DL (ref 0.6–1.3)
CREAT SERPL-MCNC: 1.48 MG/DL (ref 0.6–1.3)
DIAGNOSIS, 93000: NORMAL
DIAGNOSIS, 93000: NORMAL
DIFFERENTIAL METHOD BLD: ABNORMAL
EOSINOPHIL # BLD: 0 K/UL (ref 0–0.4)
EOSINOPHIL # BLD: 0 K/UL (ref 0–0.4)
EOSINOPHIL # BLD: 0.2 K/UL (ref 0–0.4)
EOSINOPHIL NFR BLD: 0 % (ref 0–5)
EOSINOPHIL NFR BLD: 1 % (ref 0–5)
EOSINOPHIL NFR BLD: 3 % (ref 0–5)
EPITH CASTS URNS QL MICRO: NORMAL /LPF (ref 0–5)
ERYTHROCYTE [DISTWIDTH] IN BLOOD BY AUTOMATED COUNT: 14.7 % (ref 11.6–14.5)
ERYTHROCYTE [DISTWIDTH] IN BLOOD BY AUTOMATED COUNT: 15.4 % (ref 11.6–14.5)
ERYTHROCYTE [DISTWIDTH] IN BLOOD BY AUTOMATED COUNT: 15.7 % (ref 11.6–14.5)
ERYTHROCYTE [DISTWIDTH] IN BLOOD BY AUTOMATED COUNT: 15.8 % (ref 11.6–14.5)
GAS FLOW.O2 O2 DELIVERY SYS: ABNORMAL L/MIN
GLOBULIN SER CALC-MCNC: 4.2 G/DL (ref 2–4)
GLOBULIN SER CALC-MCNC: 4.6 G/DL (ref 2–4)
GLUCOSE BLD STRIP.AUTO-MCNC: 115 MG/DL (ref 70–110)
GLUCOSE BLD STRIP.AUTO-MCNC: 121 MG/DL (ref 70–110)
GLUCOSE BLD STRIP.AUTO-MCNC: 129 MG/DL (ref 70–110)
GLUCOSE BLD STRIP.AUTO-MCNC: 130 MG/DL (ref 70–110)
GLUCOSE BLD STRIP.AUTO-MCNC: 130 MG/DL (ref 70–110)
GLUCOSE BLD STRIP.AUTO-MCNC: 135 MG/DL (ref 70–110)
GLUCOSE BLD STRIP.AUTO-MCNC: 135 MG/DL (ref 70–110)
GLUCOSE BLD STRIP.AUTO-MCNC: 136 MG/DL (ref 70–110)
GLUCOSE BLD STRIP.AUTO-MCNC: 148 MG/DL (ref 70–110)
GLUCOSE SERPL-MCNC: 104 MG/DL (ref 74–99)
GLUCOSE SERPL-MCNC: 122 MG/DL (ref 74–99)
GLUCOSE SERPL-MCNC: 130 MG/DL (ref 74–99)
GLUCOSE SERPL-MCNC: 134 MG/DL (ref 74–99)
GLUCOSE SERPL-MCNC: 146 MG/DL (ref 74–99)
GLUCOSE UR STRIP.AUTO-MCNC: NEGATIVE MG/DL
GLUCOSE UR STRIP.AUTO-MCNC: NEGATIVE MG/DL
HCO3 BLD-SCNC: 29.5 MMOL/L (ref 22–26)
HCT VFR BLD AUTO: 38.7 % (ref 36–48)
HCT VFR BLD AUTO: 40.7 % (ref 36–48)
HCT VFR BLD AUTO: 40.9 % (ref 36–48)
HCT VFR BLD AUTO: 44.3 % (ref 36–48)
HGB BLD-MCNC: 12.8 G/DL (ref 13–16)
HGB BLD-MCNC: 13.2 G/DL (ref 13–16)
HGB BLD-MCNC: 13.4 G/DL (ref 13–16)
HGB BLD-MCNC: 14.6 G/DL (ref 13–16)
HGB UR QL STRIP: ABNORMAL
HGB UR QL STRIP: NEGATIVE
INR PPP: 1.2 (ref 0.8–1.2)
KETONES UR QL STRIP.AUTO: ABNORMAL MG/DL
KETONES UR QL STRIP.AUTO: NEGATIVE MG/DL
LACTATE BLD-SCNC: 1.65 MMOL/L (ref 0.4–2)
LACTATE BLD-SCNC: 2.01 MMOL/L (ref 0.4–2)
LEUKOCYTE ESTERASE UR QL STRIP.AUTO: ABNORMAL
LEUKOCYTE ESTERASE UR QL STRIP.AUTO: NEGATIVE
LYMPHOCYTES # BLD: 1 K/UL (ref 0.9–3.6)
LYMPHOCYTES # BLD: 1.3 K/UL (ref 0.9–3.6)
LYMPHOCYTES # BLD: 1.3 K/UL (ref 0.9–3.6)
LYMPHOCYTES NFR BLD: 17 % (ref 21–52)
LYMPHOCYTES NFR BLD: 20 % (ref 21–52)
LYMPHOCYTES NFR BLD: 24 % (ref 21–52)
MAGNESIUM SERPL-MCNC: 2 MG/DL (ref 1.6–2.6)
MCH RBC QN AUTO: 30.2 PG (ref 24–34)
MCH RBC QN AUTO: 30.4 PG (ref 24–34)
MCH RBC QN AUTO: 30.6 PG (ref 24–34)
MCH RBC QN AUTO: 30.9 PG (ref 24–34)
MCHC RBC AUTO-ENTMCNC: 32.3 G/DL (ref 31–37)
MCHC RBC AUTO-ENTMCNC: 32.9 G/DL (ref 31–37)
MCHC RBC AUTO-ENTMCNC: 33 G/DL (ref 31–37)
MCHC RBC AUTO-ENTMCNC: 33.1 G/DL (ref 31–37)
MCV RBC AUTO: 91.7 FL (ref 74–97)
MCV RBC AUTO: 91.9 FL (ref 74–97)
MCV RBC AUTO: 93.7 FL (ref 74–97)
MCV RBC AUTO: 94.7 FL (ref 74–97)
MONOCYTES # BLD: 0.5 K/UL (ref 0.05–1.2)
MONOCYTES # BLD: 0.5 K/UL (ref 0.05–1.2)
MONOCYTES # BLD: 0.9 K/UL (ref 0.05–1.2)
MONOCYTES NFR BLD: 15 % (ref 3–10)
MONOCYTES NFR BLD: 8 % (ref 3–10)
MONOCYTES NFR BLD: 9 % (ref 3–10)
NEUTS SEG # BLD: 3.7 K/UL (ref 1.8–8)
NEUTS SEG # BLD: 4.1 K/UL (ref 1.8–8)
NEUTS SEG # BLD: 4.7 K/UL (ref 1.8–8)
NEUTS SEG NFR BLD: 67 % (ref 40–73)
NEUTS SEG NFR BLD: 67 % (ref 40–73)
NEUTS SEG NFR BLD: 69 % (ref 40–73)
NITRITE UR QL STRIP.AUTO: NEGATIVE
NITRITE UR QL STRIP.AUTO: NEGATIVE
O2/TOTAL GAS SETTING VFR VENT: 50 %
P-R INTERVAL, ECG05: 162 MS
P-R INTERVAL, ECG05: 188 MS
PCO2 BLD: 51.7 MMHG (ref 35–45)
PEEP RESPIRATORY: 5 CMH2O
PH BLD: 7.36 [PH] (ref 7.35–7.45)
PH UR STRIP: 5 [PH] (ref 5–8)
PH UR STRIP: 5 [PH] (ref 5–8)
PIP ISTAT,IPIP: 12
PLATELET # BLD AUTO: 128 K/UL (ref 135–420)
PLATELET # BLD AUTO: 133 K/UL (ref 135–420)
PLATELET # BLD AUTO: 136 K/UL (ref 135–420)
PLATELET # BLD AUTO: 160 K/UL (ref 135–420)
PMV BLD AUTO: 10.7 FL (ref 9.2–11.8)
PMV BLD AUTO: 11.2 FL (ref 9.2–11.8)
PMV BLD AUTO: 11.6 FL (ref 9.2–11.8)
PMV BLD AUTO: 12.2 FL (ref 9.2–11.8)
PO2 BLD: 74 MMHG (ref 80–100)
POTASSIUM SERPL-SCNC: 3.4 MMOL/L (ref 3.5–5.5)
POTASSIUM SERPL-SCNC: 3.4 MMOL/L (ref 3.5–5.5)
POTASSIUM SERPL-SCNC: 3.6 MMOL/L (ref 3.5–5.5)
POTASSIUM SERPL-SCNC: 3.8 MMOL/L (ref 3.5–5.5)
POTASSIUM SERPL-SCNC: 3.8 MMOL/L (ref 3.5–5.5)
PROCALCITONIN SERPL-MCNC: 2 NG/ML
PROT SERPL-MCNC: 7.8 G/DL (ref 6.4–8.2)
PROT SERPL-MCNC: 8 G/DL (ref 6.4–8.2)
PROT UR STRIP-MCNC: 100 MG/DL
PROT UR STRIP-MCNC: NEGATIVE MG/DL
PROTHROMBIN TIME: 15.2 SEC (ref 11.5–15.2)
Q-T INTERVAL, ECG07: 414 MS
Q-T INTERVAL, ECG07: 436 MS
QRS DURATION, ECG06: 80 MS
QRS DURATION, ECG06: 80 MS
QTC CALCULATION (BEZET), ECG08: 438 MS
QTC CALCULATION (BEZET), ECG08: 440 MS
RBC # BLD AUTO: 4.21 M/UL (ref 4.7–5.5)
RBC # BLD AUTO: 4.32 M/UL (ref 4.7–5.5)
RBC # BLD AUTO: 4.44 M/UL (ref 4.7–5.5)
RBC # BLD AUTO: 4.73 M/UL (ref 4.7–5.5)
RBC #/AREA URNS HPF: NORMAL /HPF (ref 0–5)
SAO2 % BLD: 94 % (ref 92–97)
SERVICE CMNT-IMP: ABNORMAL
SERVICE CMNT-IMP: NORMAL
SERVICE CMNT-IMP: NORMAL
SODIUM SERPL-SCNC: 146 MMOL/L (ref 136–145)
SODIUM SERPL-SCNC: 147 MMOL/L (ref 136–145)
SODIUM SERPL-SCNC: 148 MMOL/L (ref 136–145)
SODIUM SERPL-SCNC: 148 MMOL/L (ref 136–145)
SODIUM SERPL-SCNC: 149 MMOL/L (ref 136–145)
SP GR UR REFRACTOMETRY: 1.03 (ref 1–1.03)
SP GR UR REFRACTOMETRY: 1.03 (ref 1–1.03)
SPECIMEN TYPE: ABNORMAL
TOTAL RESP. RATE, ITRR: 28
TROPONIN I SERPL-MCNC: <0.02 NG/ML (ref 0–0.04)
UROBILINOGEN UR QL STRIP.AUTO: 1 EU/DL (ref 0.2–1)
UROBILINOGEN UR QL STRIP.AUTO: 1 EU/DL (ref 0.2–1)
VENTRICULAR RATE, ECG03: 61 BPM
VENTRICULAR RATE, ECG03: 68 BPM
WBC # BLD AUTO: 5.6 K/UL (ref 4.6–13.2)
WBC # BLD AUTO: 5.9 K/UL (ref 4.6–13.2)
WBC # BLD AUTO: 6.1 K/UL (ref 4.6–13.2)
WBC # BLD AUTO: 6.8 K/UL (ref 4.6–13.2)
WBC URNS QL MICRO: NORMAL /HPF (ref 0–4)

## 2020-01-01 PROCEDURE — 93005 ELECTROCARDIOGRAM TRACING: CPT

## 2020-01-01 PROCEDURE — 65270000029 HC RM PRIVATE

## 2020-01-01 PROCEDURE — 71045 X-RAY EXAM CHEST 1 VIEW: CPT

## 2020-01-01 PROCEDURE — 83605 ASSAY OF LACTIC ACID: CPT

## 2020-01-01 PROCEDURE — 74011250636 HC RX REV CODE- 250/636: Performed by: NURSE PRACTITIONER

## 2020-01-01 PROCEDURE — 85025 COMPLETE CBC W/AUTO DIFF WBC: CPT

## 2020-01-01 PROCEDURE — 74011250636 HC RX REV CODE- 250/636: Performed by: INTERNAL MEDICINE

## 2020-01-01 PROCEDURE — 74011250637 HC RX REV CODE- 250/637: Performed by: INTERNAL MEDICINE

## 2020-01-01 PROCEDURE — 74011000258 HC RX REV CODE- 258: Performed by: FAMILY MEDICINE

## 2020-01-01 PROCEDURE — 87040 BLOOD CULTURE FOR BACTERIA: CPT

## 2020-01-01 PROCEDURE — 65660000000 HC RM CCU STEPDOWN

## 2020-01-01 PROCEDURE — 82962 GLUCOSE BLOOD TEST: CPT

## 2020-01-01 PROCEDURE — 96375 TX/PRO/DX INJ NEW DRUG ADDON: CPT

## 2020-01-01 PROCEDURE — 74011000258 HC RX REV CODE- 258: Performed by: INTERNAL MEDICINE

## 2020-01-01 PROCEDURE — 74011250637 HC RX REV CODE- 250/637: Performed by: FAMILY MEDICINE

## 2020-01-01 PROCEDURE — 74011000250 HC RX REV CODE- 250: Performed by: FAMILY MEDICINE

## 2020-01-01 PROCEDURE — 74011250636 HC RX REV CODE- 250/636: Performed by: FAMILY MEDICINE

## 2020-01-01 PROCEDURE — 80048 BASIC METABOLIC PNL TOTAL CA: CPT

## 2020-01-01 PROCEDURE — 85730 THROMBOPLASTIN TIME PARTIAL: CPT

## 2020-01-01 PROCEDURE — 94660 CPAP INITIATION&MGMT: CPT

## 2020-01-01 PROCEDURE — 74011250637 HC RX REV CODE- 250/637: Performed by: NURSE PRACTITIONER

## 2020-01-01 PROCEDURE — 74011000250 HC RX REV CODE- 250: Performed by: EMERGENCY MEDICINE

## 2020-01-01 PROCEDURE — 77030018836 HC SOL IRR NACL ICUM -A

## 2020-01-01 PROCEDURE — 74011250636 HC RX REV CODE- 250/636: Performed by: EMERGENCY MEDICINE

## 2020-01-01 PROCEDURE — 83880 ASSAY OF NATRIURETIC PEPTIDE: CPT

## 2020-01-01 PROCEDURE — 82550 ASSAY OF CK (CPK): CPT

## 2020-01-01 PROCEDURE — 36415 COLL VENOUS BLD VENIPUNCTURE: CPT

## 2020-01-01 PROCEDURE — 83735 ASSAY OF MAGNESIUM: CPT

## 2020-01-01 PROCEDURE — 77030019938 HC TBNG IV PCA ICUM -A

## 2020-01-01 PROCEDURE — 36600 WITHDRAWAL OF ARTERIAL BLOOD: CPT

## 2020-01-01 PROCEDURE — 77030010545

## 2020-01-01 PROCEDURE — 77010033711 HC HIGH FLOW OXYGEN

## 2020-01-01 PROCEDURE — 77030040830 HC CATH URETH FOL MDII -A

## 2020-01-01 PROCEDURE — 80053 COMPREHEN METABOLIC PANEL: CPT

## 2020-01-01 PROCEDURE — 84145 PROCALCITONIN (PCT): CPT

## 2020-01-01 PROCEDURE — 85027 COMPLETE CBC AUTOMATED: CPT

## 2020-01-01 PROCEDURE — 94761 N-INVAS EAR/PLS OXIMETRY MLT: CPT

## 2020-01-01 PROCEDURE — 76450000000

## 2020-01-01 PROCEDURE — 82803 BLOOD GASES ANY COMBINATION: CPT

## 2020-01-01 PROCEDURE — 77030037878 HC DRSG MEPILEX >48IN BORD MOLN -B

## 2020-01-01 PROCEDURE — 5A09357 ASSISTANCE WITH RESPIRATORY VENTILATION, LESS THAN 24 CONSECUTIVE HOURS, CONTINUOUS POSITIVE AIRWAY PRESSURE: ICD-10-PCS | Performed by: FAMILY MEDICINE

## 2020-01-01 PROCEDURE — 92610 EVALUATE SWALLOWING FUNCTION: CPT

## 2020-01-01 PROCEDURE — 92526 ORAL FUNCTION THERAPY: CPT

## 2020-01-01 PROCEDURE — 71275 CT ANGIOGRAPHY CHEST: CPT

## 2020-01-01 PROCEDURE — 99285 EMERGENCY DEPT VISIT HI MDM: CPT

## 2020-01-01 PROCEDURE — 81001 URINALYSIS AUTO W/SCOPE: CPT

## 2020-01-01 PROCEDURE — 74011636320 HC RX REV CODE- 636/320: Performed by: EMERGENCY MEDICINE

## 2020-01-01 PROCEDURE — 96374 THER/PROPH/DIAG INJ IV PUSH: CPT

## 2020-01-01 PROCEDURE — 85610 PROTHROMBIN TIME: CPT

## 2020-01-01 PROCEDURE — 81003 URINALYSIS AUTO W/O SCOPE: CPT

## 2020-01-01 RX ORDER — KETOROLAC TROMETHAMINE 15 MG/ML
15 INJECTION, SOLUTION INTRAMUSCULAR; INTRAVENOUS EVERY 8 HOURS
Status: COMPLETED | OUTPATIENT
Start: 2020-01-01 | End: 2020-01-01

## 2020-01-01 RX ORDER — DEXTROSE, SODIUM CHLORIDE, AND POTASSIUM CHLORIDE 5; .45; .15 G/100ML; G/100ML; G/100ML
125 INJECTION INTRAVENOUS CONTINUOUS
Status: DISCONTINUED | OUTPATIENT
Start: 2020-01-01 | End: 2020-01-01

## 2020-01-01 RX ORDER — ALPRAZOLAM 1 MG/1
1 TABLET ORAL
Status: DISCONTINUED | OUTPATIENT
Start: 2020-01-01 | End: 2020-01-01

## 2020-01-01 RX ORDER — IRBESARTAN 150 MG/1
150 TABLET ORAL DAILY
Status: DISCONTINUED | OUTPATIENT
Start: 2020-01-01 | End: 2020-01-01

## 2020-01-01 RX ORDER — HALOPERIDOL 5 MG/ML
5 INJECTION INTRAMUSCULAR
Status: COMPLETED | OUTPATIENT
Start: 2020-01-01 | End: 2020-01-01

## 2020-01-01 RX ORDER — DIVALPROEX SODIUM 250 MG/1
250 TABLET, DELAYED RELEASE ORAL 2 TIMES DAILY
Status: DISCONTINUED | OUTPATIENT
Start: 2020-01-01 | End: 2020-01-01

## 2020-01-01 RX ORDER — MORPHINE SULFATE 100 MG/5ML
5 SOLUTION ORAL
Status: DISCONTINUED | OUTPATIENT
Start: 2020-01-01 | End: 2020-01-01 | Stop reason: HOSPADM

## 2020-01-01 RX ORDER — METOPROLOL SUCCINATE 50 MG/1
50 TABLET, EXTENDED RELEASE ORAL DAILY
Status: DISCONTINUED | OUTPATIENT
Start: 2020-01-01 | End: 2020-01-01

## 2020-01-01 RX ORDER — ROSUVASTATIN CALCIUM 5 MG/1
5 TABLET, COATED ORAL
Status: DISCONTINUED | OUTPATIENT
Start: 2020-01-01 | End: 2020-01-01

## 2020-01-01 RX ORDER — CEFTRIAXONE 1 G/1
1 INJECTION, POWDER, FOR SOLUTION INTRAMUSCULAR; INTRAVENOUS
Status: COMPLETED | OUTPATIENT
Start: 2020-01-01 | End: 2020-01-01

## 2020-01-01 RX ORDER — PANTOPRAZOLE SODIUM 40 MG/1
40 TABLET, DELAYED RELEASE ORAL
Status: DISCONTINUED | OUTPATIENT
Start: 2020-01-01 | End: 2020-01-01

## 2020-01-01 RX ORDER — DONEPEZIL HYDROCHLORIDE 23 MG/1
23 TABLET, FILM COATED ORAL DAILY
Status: DISCONTINUED | OUTPATIENT
Start: 2020-01-01 | End: 2020-01-01

## 2020-01-01 RX ORDER — SCOLOPAMINE TRANSDERMAL SYSTEM 1 MG/1
1 PATCH, EXTENDED RELEASE TRANSDERMAL
Status: DISCONTINUED | OUTPATIENT
Start: 2020-01-01 | End: 2020-01-01 | Stop reason: HOSPADM

## 2020-01-01 RX ORDER — LORAZEPAM 2 MG/ML
INJECTION INTRAMUSCULAR
Status: DISPENSED
Start: 2020-01-01 | End: 2020-01-01

## 2020-01-01 RX ORDER — ALBUTEROL SULFATE 0.83 MG/ML
2.5 SOLUTION RESPIRATORY (INHALATION)
Status: COMPLETED | OUTPATIENT
Start: 2020-01-01 | End: 2020-01-01

## 2020-01-01 RX ORDER — LORAZEPAM 2 MG/ML
1 INJECTION INTRAMUSCULAR
Status: COMPLETED | OUTPATIENT
Start: 2020-01-01 | End: 2020-01-01

## 2020-01-01 RX ORDER — MORPHINE SULFATE 5 MG/ML
INJECTION, SOLUTION INTRAVENOUS CONTINUOUS
Status: DISCONTINUED | OUTPATIENT
Start: 2020-01-01 | End: 2020-01-01 | Stop reason: HOSPADM

## 2020-01-01 RX ORDER — ESCITALOPRAM OXALATE 10 MG/1
10 TABLET ORAL DAILY
Status: DISCONTINUED | OUTPATIENT
Start: 2020-01-01 | End: 2020-01-01

## 2020-01-01 RX ORDER — LORAZEPAM 2 MG/ML
0.5 CONCENTRATE ORAL
Status: DISCONTINUED | OUTPATIENT
Start: 2020-01-01 | End: 2020-01-01 | Stop reason: HOSPADM

## 2020-01-01 RX ORDER — MORPHINE SULFATE 2 MG/ML
1 INJECTION, SOLUTION INTRAMUSCULAR; INTRAVENOUS
Status: DISCONTINUED | OUTPATIENT
Start: 2020-01-01 | End: 2020-01-01 | Stop reason: HOSPADM

## 2020-01-01 RX ORDER — QUETIAPINE FUMARATE 25 MG/1
50 TABLET, FILM COATED ORAL
Status: DISCONTINUED | OUTPATIENT
Start: 2020-01-01 | End: 2020-01-01

## 2020-01-01 RX ORDER — HALOPERIDOL 5 MG/ML
5 INJECTION INTRAMUSCULAR
Status: DISCONTINUED | OUTPATIENT
Start: 2020-01-01 | End: 2020-01-01 | Stop reason: HOSPADM

## 2020-01-01 RX ORDER — KETOROLAC TROMETHAMINE 15 MG/ML
15 INJECTION, SOLUTION INTRAMUSCULAR; INTRAVENOUS ONCE
Status: COMPLETED | OUTPATIENT
Start: 2020-01-01 | End: 2020-01-01

## 2020-01-01 RX ORDER — ACETAMINOPHEN 650 MG/1
650 SUPPOSITORY RECTAL
Status: DISCONTINUED | OUTPATIENT
Start: 2020-01-01 | End: 2020-01-01 | Stop reason: HOSPADM

## 2020-01-01 RX ORDER — ENOXAPARIN SODIUM 100 MG/ML
40 INJECTION SUBCUTANEOUS EVERY 24 HOURS
Status: DISCONTINUED | OUTPATIENT
Start: 2020-01-01 | End: 2020-01-01

## 2020-01-01 RX ORDER — VANCOMYCIN HYDROCHLORIDE
1250 EVERY 24 HOURS
Status: DISCONTINUED | OUTPATIENT
Start: 2020-01-01 | End: 2020-01-01

## 2020-01-01 RX ORDER — FUROSEMIDE 10 MG/ML
40 INJECTION INTRAMUSCULAR; INTRAVENOUS ONCE
Status: COMPLETED | OUTPATIENT
Start: 2020-01-01 | End: 2020-01-01

## 2020-01-01 RX ORDER — GUAIFENESIN 100 MG/5ML
81 LIQUID (ML) ORAL DAILY
Status: DISCONTINUED | OUTPATIENT
Start: 2020-01-01 | End: 2020-01-01

## 2020-01-01 RX ORDER — AMLODIPINE BESYLATE 10 MG/1
10 TABLET ORAL DAILY
Status: DISCONTINUED | OUTPATIENT
Start: 2020-01-01 | End: 2020-01-01

## 2020-01-01 RX ORDER — MELATONIN
1000 DAILY
Status: DISCONTINUED | OUTPATIENT
Start: 2020-01-01 | End: 2020-01-01

## 2020-01-01 RX ORDER — FUROSEMIDE 10 MG/ML
20 INJECTION INTRAMUSCULAR; INTRAVENOUS ONCE
Status: COMPLETED | OUTPATIENT
Start: 2020-01-01 | End: 2020-01-01

## 2020-01-01 RX ORDER — KETOROLAC TROMETHAMINE 15 MG/ML
15 INJECTION, SOLUTION INTRAMUSCULAR; INTRAVENOUS
Status: COMPLETED | OUTPATIENT
Start: 2020-01-01 | End: 2020-01-01

## 2020-01-01 RX ORDER — MEMANTINE HYDROCHLORIDE 10 MG/1
10 TABLET ORAL 2 TIMES DAILY
Status: DISCONTINUED | OUTPATIENT
Start: 2020-01-01 | End: 2020-01-01

## 2020-01-01 RX ORDER — MORPHINE SULFATE 100 MG/5ML
5 SOLUTION ORAL EVERY 4 HOURS
Status: DISCONTINUED | OUTPATIENT
Start: 2020-01-01 | End: 2020-01-01

## 2020-01-01 RX ADMIN — PIPERACILLIN SODIUM AND TAZOBACTAM SODIUM 3.38 G: 3; .375 INJECTION, POWDER, LYOPHILIZED, FOR SOLUTION INTRAVENOUS at 02:36

## 2020-01-01 RX ADMIN — DEXTROSE MONOHYDRATE, SODIUM CHLORIDE, AND POTASSIUM CHLORIDE 125 ML/HR: 50; 4.5; 1.49 INJECTION, SOLUTION INTRAVENOUS at 11:42

## 2020-01-01 RX ADMIN — VALPROATE SODIUM 125 MG: 100 INJECTION, SOLUTION INTRAVENOUS at 23:13

## 2020-01-01 RX ADMIN — DEXTROSE MONOHYDRATE, SODIUM CHLORIDE, AND POTASSIUM CHLORIDE 125 ML/HR: 50; 4.5; 1.49 INJECTION, SOLUTION INTRAVENOUS at 01:05

## 2020-01-01 RX ADMIN — ACETAMINOPHEN 650 MG: 650 SUPPOSITORY RECTAL at 20:50

## 2020-01-01 RX ADMIN — VALPROATE SODIUM 125 MG: 100 INJECTION, SOLUTION INTRAVENOUS at 11:41

## 2020-01-01 RX ADMIN — VALPROATE SODIUM 125 MG: 100 INJECTION, SOLUTION INTRAVENOUS at 23:48

## 2020-01-01 RX ADMIN — PIPERACILLIN SODIUM AND TAZOBACTAM SODIUM 3.38 G: 3; .375 INJECTION, POWDER, LYOPHILIZED, FOR SOLUTION INTRAVENOUS at 14:45

## 2020-01-01 RX ADMIN — PIPERACILLIN SODIUM AND TAZOBACTAM SODIUM 3.38 G: 3; .375 INJECTION, POWDER, LYOPHILIZED, FOR SOLUTION INTRAVENOUS at 13:41

## 2020-01-01 RX ADMIN — KETOROLAC TROMETHAMINE 15 MG: 15 INJECTION, SOLUTION INTRAMUSCULAR; INTRAVENOUS at 12:54

## 2020-01-01 RX ADMIN — VANCOMYCIN HYDROCHLORIDE 2500 MG: 1 INJECTION, POWDER, LYOPHILIZED, FOR SOLUTION INTRAVENOUS at 14:49

## 2020-01-01 RX ADMIN — VALPROATE SODIUM 125 MG: 100 INJECTION, SOLUTION INTRAVENOUS at 00:21

## 2020-01-01 RX ADMIN — KETOROLAC TROMETHAMINE 15 MG: 15 INJECTION, SOLUTION INTRAMUSCULAR; INTRAVENOUS at 19:57

## 2020-01-01 RX ADMIN — VALPROATE SODIUM 125 MG: 100 INJECTION, SOLUTION INTRAVENOUS at 19:02

## 2020-01-01 RX ADMIN — VALPROATE SODIUM 125 MG: 100 INJECTION, SOLUTION INTRAVENOUS at 06:34

## 2020-01-01 RX ADMIN — KETOROLAC TROMETHAMINE 15 MG: 15 INJECTION, SOLUTION INTRAMUSCULAR; INTRAVENOUS at 06:18

## 2020-01-01 RX ADMIN — DEXTROSE MONOHYDRATE, SODIUM CHLORIDE, AND POTASSIUM CHLORIDE 125 ML/HR: 50; 4.5; 1.49 INJECTION, SOLUTION INTRAVENOUS at 03:26

## 2020-01-01 RX ADMIN — MORPHINE SULFATE 5 MG: 100 SOLUTION ORAL at 08:47

## 2020-01-01 RX ADMIN — KETOROLAC TROMETHAMINE 15 MG: 15 INJECTION, SOLUTION INTRAMUSCULAR; INTRAVENOUS at 13:15

## 2020-01-01 RX ADMIN — MORPHINE SULFATE 5 MG: 100 SOLUTION ORAL at 00:21

## 2020-01-01 RX ADMIN — ACETAMINOPHEN 650 MG: 650 SUPPOSITORY RECTAL at 09:24

## 2020-01-01 RX ADMIN — AZITHROMYCIN MONOHYDRATE 500 MG: 500 INJECTION, POWDER, LYOPHILIZED, FOR SOLUTION INTRAVENOUS at 05:55

## 2020-01-01 RX ADMIN — VALPROATE SODIUM 125 MG: 100 INJECTION, SOLUTION INTRAVENOUS at 12:53

## 2020-01-01 RX ADMIN — DONEPEZIL HYDROCHLORIDE 23 MG: 23 TABLET, FILM COATED ORAL at 09:00

## 2020-01-01 RX ADMIN — VALPROATE SODIUM 125 MG: 100 INJECTION, SOLUTION INTRAVENOUS at 01:02

## 2020-01-01 RX ADMIN — DIVALPROEX SODIUM 250 MG: 250 TABLET, DELAYED RELEASE ORAL at 09:09

## 2020-01-01 RX ADMIN — MORPHINE SULFATE 5 MG: 100 SOLUTION ORAL at 16:13

## 2020-01-01 RX ADMIN — MORPHINE SULFATE 5 MG: 100 SOLUTION ORAL at 05:21

## 2020-01-01 RX ADMIN — KETOROLAC TROMETHAMINE 15 MG: 15 INJECTION, SOLUTION INTRAMUSCULAR; INTRAVENOUS at 05:21

## 2020-01-01 RX ADMIN — PIPERACILLIN SODIUM AND TAZOBACTAM SODIUM 3.38 G: 3; .375 INJECTION, POWDER, LYOPHILIZED, FOR SOLUTION INTRAVENOUS at 09:08

## 2020-01-01 RX ADMIN — KETOROLAC TROMETHAMINE 15 MG: 15 INJECTION, SOLUTION INTRAMUSCULAR; INTRAVENOUS at 05:54

## 2020-01-01 RX ADMIN — KETOROLAC TROMETHAMINE 15 MG: 15 INJECTION, SOLUTION INTRAMUSCULAR; INTRAVENOUS at 21:03

## 2020-01-01 RX ADMIN — MORPHINE SULFATE: 10 INJECTION, SOLUTION INTRAMUSCULAR; INTRAVENOUS at 11:13

## 2020-01-01 RX ADMIN — MORPHINE SULFATE 5 MG: 100 SOLUTION ORAL at 23:47

## 2020-01-01 RX ADMIN — MORPHINE SULFATE 5 MG: 100 SOLUTION ORAL at 20:43

## 2020-01-01 RX ADMIN — LORAZEPAM 1 MG: 2 INJECTION INTRAMUSCULAR at 01:23

## 2020-01-01 RX ADMIN — MORPHINE SULFATE 5 MG: 100 SOLUTION ORAL at 11:42

## 2020-01-01 RX ADMIN — ENOXAPARIN SODIUM 40 MG: 40 INJECTION SUBCUTANEOUS at 06:48

## 2020-01-01 RX ADMIN — ASPIRIN 81 MG 81 MG: 81 TABLET ORAL at 09:09

## 2020-01-01 RX ADMIN — FUROSEMIDE 20 MG: 20 INJECTION, SOLUTION INTRAMUSCULAR; INTRAVENOUS at 19:57

## 2020-01-01 RX ADMIN — IRBESARTAN 150 MG: 150 TABLET ORAL at 09:08

## 2020-01-01 RX ADMIN — MORPHINE SULFATE 5 MG: 100 SOLUTION ORAL at 10:03

## 2020-01-01 RX ADMIN — MORPHINE SULFATE 5 MG: 100 SOLUTION ORAL at 21:24

## 2020-01-01 RX ADMIN — VALPROATE SODIUM 125 MG: 100 INJECTION, SOLUTION INTRAVENOUS at 06:57

## 2020-01-01 RX ADMIN — MORPHINE SULFATE 5 MG: 100 SOLUTION ORAL at 12:16

## 2020-01-01 RX ADMIN — FUROSEMIDE 40 MG: 10 INJECTION, SOLUTION INTRAMUSCULAR; INTRAVENOUS at 09:43

## 2020-01-01 RX ADMIN — VALPROATE SODIUM 125 MG: 100 INJECTION, SOLUTION INTRAVENOUS at 06:18

## 2020-01-01 RX ADMIN — MORPHINE SULFATE 5 MG: 100 SOLUTION ORAL at 09:34

## 2020-01-01 RX ADMIN — ACETAMINOPHEN 650 MG: 650 SUPPOSITORY RECTAL at 12:17

## 2020-01-01 RX ADMIN — PIPERACILLIN SODIUM AND TAZOBACTAM SODIUM 3.38 G: 3; .375 INJECTION, POWDER, LYOPHILIZED, FOR SOLUTION INTRAVENOUS at 20:37

## 2020-01-01 RX ADMIN — KETOROLAC TROMETHAMINE 15 MG: 15 INJECTION, SOLUTION INTRAMUSCULAR; INTRAVENOUS at 16:13

## 2020-01-01 RX ADMIN — SODIUM CHLORIDE 1000 ML: 900 INJECTION, SOLUTION INTRAVENOUS at 02:57

## 2020-01-01 RX ADMIN — ACETAMINOPHEN 650 MG: 650 SUPPOSITORY RECTAL at 11:44

## 2020-01-01 RX ADMIN — ENOXAPARIN SODIUM 40 MG: 40 INJECTION SUBCUTANEOUS at 03:26

## 2020-01-01 RX ADMIN — MORPHINE SULFATE 5 MG: 100 SOLUTION ORAL at 17:22

## 2020-01-01 RX ADMIN — ACETAMINOPHEN 650 MG: 650 SUPPOSITORY RECTAL at 08:47

## 2020-01-01 RX ADMIN — VALPROATE SODIUM 125 MG: 100 INJECTION, SOLUTION INTRAVENOUS at 17:39

## 2020-01-01 RX ADMIN — MULTIPLE VITAMINS W/ MINERALS TAB 1 TABLET: TAB at 09:09

## 2020-01-01 RX ADMIN — ENOXAPARIN SODIUM 40 MG: 40 INJECTION SUBCUTANEOUS at 05:43

## 2020-01-01 RX ADMIN — VITAMIN D, TAB 1000IU (100/BT) 1 TABLET: 25 TAB at 09:09

## 2020-01-01 RX ADMIN — VALPROATE SODIUM 125 MG: 100 INJECTION, SOLUTION INTRAVENOUS at 12:41

## 2020-01-01 RX ADMIN — LORAZEPAM 1 MG: 2 INJECTION INTRAMUSCULAR; INTRAVENOUS at 21:29

## 2020-01-01 RX ADMIN — LORAZEPAM 1 MG: 2 INJECTION INTRAMUSCULAR; INTRAVENOUS at 22:04

## 2020-01-01 RX ADMIN — MORPHINE SULFATE 5 MG: 100 SOLUTION ORAL at 20:56

## 2020-01-01 RX ADMIN — PIPERACILLIN SODIUM AND TAZOBACTAM SODIUM 3.38 G: 3; .375 INJECTION, POWDER, LYOPHILIZED, FOR SOLUTION INTRAVENOUS at 08:13

## 2020-01-01 RX ADMIN — VALPROATE SODIUM 125 MG: 100 INJECTION, SOLUTION INTRAVENOUS at 17:38

## 2020-01-01 RX ADMIN — VALPROATE SODIUM 125 MG: 100 INJECTION, SOLUTION INTRAVENOUS at 17:22

## 2020-01-01 RX ADMIN — VALPROATE SODIUM 125 MG: 100 INJECTION, SOLUTION INTRAVENOUS at 12:17

## 2020-01-01 RX ADMIN — CEFTRIAXONE SODIUM 1 G: 1 INJECTION, POWDER, FOR SOLUTION INTRAMUSCULAR; INTRAVENOUS at 05:55

## 2020-01-01 RX ADMIN — MORPHINE SULFATE 5 MG: 100 SOLUTION ORAL at 04:30

## 2020-01-01 RX ADMIN — MORPHINE SULFATE 5 MG: 100 SOLUTION ORAL at 12:39

## 2020-01-01 RX ADMIN — PIPERACILLIN SODIUM AND TAZOBACTAM SODIUM 3.38 G: 3; .375 INJECTION, POWDER, LYOPHILIZED, FOR SOLUTION INTRAVENOUS at 05:44

## 2020-01-01 RX ADMIN — IOPAMIDOL 100 ML: 755 INJECTION, SOLUTION INTRAVENOUS at 01:16

## 2020-01-01 RX ADMIN — PIPERACILLIN SODIUM AND TAZOBACTAM SODIUM 3.38 G: 3; .375 INJECTION, POWDER, LYOPHILIZED, FOR SOLUTION INTRAVENOUS at 22:06

## 2020-01-01 RX ADMIN — PIPERACILLIN SODIUM AND TAZOBACTAM SODIUM 3.38 G: 3; .375 INJECTION, POWDER, LYOPHILIZED, FOR SOLUTION INTRAVENOUS at 03:27

## 2020-01-01 RX ADMIN — DEXTROSE MONOHYDRATE, SODIUM CHLORIDE, AND POTASSIUM CHLORIDE 125 ML/HR: 50; 4.5; 1.49 INJECTION, SOLUTION INTRAVENOUS at 22:09

## 2020-01-01 RX ADMIN — VALPROATE SODIUM 125 MG: 100 INJECTION, SOLUTION INTRAVENOUS at 12:38

## 2020-01-01 RX ADMIN — MORPHINE SULFATE 5 MG: 100 SOLUTION ORAL at 08:19

## 2020-01-01 RX ADMIN — MORPHINE SULFATE 5 MG: 100 SOLUTION ORAL at 16:32

## 2020-01-01 RX ADMIN — ALBUTEROL SULFATE 2.5 MG: 2.5 SOLUTION RESPIRATORY (INHALATION) at 22:47

## 2020-01-01 RX ADMIN — HALOPERIDOL LACTATE 5 MG: 5 INJECTION INTRAMUSCULAR at 23:56

## 2020-01-01 RX ADMIN — AMLODIPINE BESYLATE 10 MG: 10 TABLET ORAL at 09:08

## 2020-01-01 RX ADMIN — MEMANTINE HYDROCHLORIDE 10 MG: 10 TABLET ORAL at 09:09

## 2020-01-01 RX ADMIN — ESCITALOPRAM OXALATE 10 MG: 10 TABLET ORAL at 09:08

## 2020-01-01 RX ADMIN — VALPROATE SODIUM 125 MG: 100 INJECTION, SOLUTION INTRAVENOUS at 05:52

## 2020-01-01 RX ADMIN — HALOPERIDOL LACTATE 5 MG: 5 INJECTION INTRAMUSCULAR at 00:51

## 2020-01-01 RX ADMIN — VALPROATE SODIUM 125 MG: 100 INJECTION, SOLUTION INTRAVENOUS at 05:54

## 2020-01-01 RX ADMIN — METOPROLOL SUCCINATE 50 MG: 50 TABLET, EXTENDED RELEASE ORAL at 09:09

## 2020-01-01 RX ADMIN — MORPHINE SULFATE 5 MG: 100 SOLUTION ORAL at 03:32

## 2020-01-01 RX ADMIN — VALPROATE SODIUM 125 MG: 100 INJECTION, SOLUTION INTRAVENOUS at 00:51

## 2020-01-01 RX ADMIN — MORPHINE SULFATE 5 MG: 100 SOLUTION ORAL at 08:25

## 2020-01-01 RX ADMIN — PANTOPRAZOLE SODIUM 40 MG: 40 TABLET, DELAYED RELEASE ORAL at 09:08

## 2020-01-01 NOTE — ED TRIAGE NOTES
[facility] change in behavioral.. reportedly, sent by spouse for combative behavioral and making violent threats. . pt says on arrival if his wife isn't here soon he be will \"bust somebody up\" . Nicola Brower ... At this time the wife has arrived in er. ..

## 2020-01-01 NOTE — DISCHARGE INSTRUCTIONS
Patient Education        Alzheimer's Disease: Care Instructions  Your Care Instructions    Alzheimer's disease is a type of dementia. It causes memory loss and affects judgment, language, and behavior. You may have trouble making decisions or may get lost in places that you used to know well. Alzheimer's disease is different than mild memory loss that occurs with aging. It is not clear what causes Alzheimer's disease, but it is the most common form of dementia in older adults. Finding out that you have this disease is a shock. You may be afraid and worried about how the condition will change your life. Although there is no cure at this time, medicine in some cases may slow memory loss for a while. Other medicines may be able to help you sleep or cope with depression and behavior changes. Alzheimer's disease is different for everyone. It may take many years to develop. In some cases, people can function well for a long time. In the early stage of the disease, you can do things at home to make life easier and safer. You also can keep doing your hobbies and other activities. Many people find comfort in planning now for their future needs. Follow-up care is a key part of your treatment and safety. Be sure to make and go to all appointments, and call your doctor if you are having problems. It's also a good idea to know your test results and keep a list of the medicines you take. How can you care for yourself at home? Taking care of yourself  · If your doctor gives you medicines, take them exactly as prescribed. Call your doctor if you think you are having a problem with your medicine. You will get more details on the medicines your doctor prescribes. · Eat a balanced diet. Get plenty of whole grains, fruits, and vegetables every day. If you are not hungry at mealtimes, eat snacks at midmorning and in the afternoon.  Try drinks such as Boost, Ensure, or Sustacal if you are having trouble keeping your weight up.  · Stay active. Exercise such as walking may slow the decline of your mental abilities. Try to stay active mentally too. Read and work crossword puzzles if you enjoy these activities. · If you have trouble sleeping, do not nap during the day. Get regular exercise (but not within several hours of bedtime). Drink a glass of warm milk or caffeine-free herbal tea before going to bed. · Ask your doctor about support groups and other resources in your area. They can help people who have Alzheimer's disease and their families. · Be patient. You may find that a task takes you longer than it used to. · If you have not already done so, make a list of advance directives. Advance directives are instructions to your doctor and family members about what kind of care you want if you become unable to speak or express yourself. Talk to a  about making a will, if you do not already have one. Keeping schedules  · Develop a routine. You will feel less frustrated or confused if you have a clear, simple plan of what to do every day. ? Make lists of your medicines and when to take them. ? Write down appointments and other tasks in a calendar. ? Put sticky notes around the house to help you remember events and other things you have to do. ? Schedule activities and tasks for times of the day when you are best able to handle them. Staying safe  · Tell someone when you are going out and where you are going. Let the person know when you will be back. Before you go out alone, write down where you are going, how to get there, and how to get back home. Do this even if you have gone there many times before. Take someone along with you when possible. · Make your home safe. Tack down rugs, put no-slip tape in the tub, use handrails, and put safety switches on stoves and appliances. · Have a family member or other caregiver tell you whether you are driving badly. Deciding to stop driving is very hard for many people.  Driving helps you feel independent. Your state 's license bureau can do a driving test if there is any question. Plan for other means of getting around when you are no longer able to drive. · Use strong lighting, especially at night. Put night-lights in bedrooms, hallways, and bathrooms. · Lower the hot water temperature setting to 120°F or lower to avoid burns. When should you call for help? Call 911 anytime you think you may need emergency care. For example, call if:    · You are lost and do not know whom to call.     · You are injured and do not know whom to call.    Call your doctor now or seek immediate medical care if:    · Your symptoms suddenly get much worse.    Watch closely for changes in your health, and be sure to contact your doctor if:    · You want more information about how you can take care of yourself. Where can you learn more? Go to http://christie-brigid.info/. Enter Y179 in the search box to learn more about \"Alzheimer's Disease: Care Instructions. \"  Current as of: May 28, 2019  Content Version: 12.2  © 4256-3537 [employer]. Care instructions adapted under license by [employer] (which disclaims liability or warranty for this information). If you have questions about a medical condition or this instruction, always ask your healthcare professional. [place] [address] any warranty or liability for your use of this information. Patient Education        Alzheimer's Disease: Care Instructions  Your Care Instructions    Alzheimer's disease is a type of dementia. It causes memory loss and affects judgment, language, and behavior. You may have trouble making decisions or may get lost in places that you used to know well. Alzheimer's disease is different than mild memory loss that occurs with aging. It is not clear what causes Alzheimer's disease, but it is the most common form of dementia in older adults.   Finding out that you have this disease is a shock. You may be afraid and worried about how the condition will change your life. Although there is no cure at this time, medicine in some cases may slow memory loss for a while. Other medicines may be able to help you sleep or cope with depression and behavior changes. Alzheimer's disease is different for everyone. It may take many years to develop. In some cases, people can function well for a long time. In the early stage of the disease, you can do things at home to make life easier and safer. You also can keep doing your hobbies and other activities. Many people find comfort in planning now for their future needs. Follow-up care is a key part of your treatment and safety. Be sure to make and go to all appointments, and call your doctor if you are having problems. It's also a good idea to know your test results and keep a list of the medicines you take. How can you care for yourself at home? Taking care of yourself  · If your doctor gives you medicines, take them exactly as prescribed. Call your doctor if you think you are having a problem with your medicine. You will get more details on the medicines your doctor prescribes. · Eat a balanced diet. Get plenty of whole grains, fruits, and vegetables every day. If you are not hungry at mealtimes, eat snacks at midmorning and in the afternoon. Try drinks such as Boost, Ensure, or Sustacal if you are having trouble keeping your weight up. · Stay active. Exercise such as walking may slow the decline of your mental abilities. Try to stay active mentally too. Read and work crossword puzzles if you enjoy these activities. · If you have trouble sleeping, do not nap during the day. Get regular exercise (but not within several hours of bedtime). Drink a glass of warm milk or caffeine-free herbal tea before going to bed. · Ask your doctor about support groups and other resources in your area.  They can help people who have Alzheimer's disease and their families. · Be patient. You may find that a task takes you longer than it used to. · If you have not already done so, make a list of advance directives. Advance directives are instructions to your doctor and family members about what kind of care you want if you become unable to speak or express yourself. Talk to a  about making a will, if you do not already have one. Keeping schedules  · Develop a routine. You will feel less frustrated or confused if you have a clear, simple plan of what to do every day. ? Make lists of your medicines and when to take them. ? Write down appointments and other tasks in a calendar. ? Put sticky notes around the house to help you remember events and other things you have to do. ? Schedule activities and tasks for times of the day when you are best able to handle them. Staying safe  · Tell someone when you are going out and where you are going. Let the person know when you will be back. Before you go out alone, write down where you are going, how to get there, and how to get back home. Do this even if you have gone there many times before. Take someone along with you when possible. · Make your home safe. Tack down rugs, put no-slip tape in the tub, use handrails, and put safety switches on stoves and appliances. · Have a family member or other caregiver tell you whether you are driving badly. Deciding to stop driving is very hard for many people. Driving helps you feel independent. Your state 's license bureau can do a driving test if there is any question. Plan for other means of getting around when you are no longer able to drive. · Use strong lighting, especially at night. Put night-lights in bedrooms, hallways, and bathrooms. · Lower the hot water temperature setting to 120°F or lower to avoid burns. When should you call for help? Call 911 anytime you think you may need emergency care.  For example, call if:    · You are lost and do not know whom to call.     · You are injured and do not know whom to call.    Call your doctor now or seek immediate medical care if:    · Your symptoms suddenly get much worse.    Watch closely for changes in your health, and be sure to contact your doctor if:    · You want more information about how you can take care of yourself. Where can you learn more? Go to http://christie-brigid.info/. Enter Y179 in the search box to learn more about \"Alzheimer's Disease: Care Instructions. \"  Current as of: May 28, 2019  Content Version: 12.2  © 6298-7235 [employer]. Care instructions adapted under license by [employer] (which disclaims liability or warranty for this information). If you have questions about a medical condition or this instruction, always ask your healthcare professional. [address] any warranty or liability for your use of this information.

## 2020-01-01 NOTE — ED NOTES
I have reviewed discharge instructions with the patient and [facility]. The patient and [facility] verbalized understanding. Patient armband removed and shredded  IV catheter discontinued intact. Site without signs and symptoms of complications. Dressing and pressure applied.

## 2020-01-01 NOTE — ED PROVIDER NOTES
HPI Pt arrived via ems from the crossings at [facility] for change in behavioral.. reportedly, He was sent by spouse for combative behavioral and making violent threats. NO fever, chills, muscle aches or pains. Patient has severe Alzheimer's disease. Patient wife states he has violent tendencies sometimes. Past Medical History:   Diagnosis Date    Anxiety     Arthritis     Cancer ([address] 2009    bladder     Carotid duplex 05/26/2011    No occlusive disease >49% bilaterally.     Chronic back pain 5/6/2010    Chronic traumatic encephalopathy     D[address] [address]     Depression     he has taken Xanax for 16 years for this    Diabetes ([facility] [address]     Dizziness     ED (erectile dysfunction)     Fibrosis of knee joint     Peripatellar, right    Headache(784.0)     HTN (hypertension), benign 4/1/2010    Late onset Alzheimer's disease with behavioral disturbance (HCC)     Left wrist pain     Lumbar spinal stenosis 9/25/2010    Neuropathy of lower extremity     Obesity     Osteoarthritis of both knees     Sleep apnea     cpap use    SOB (shortness of breath)     Vertigo, benign positional        Past Surgical History:   Procedure Laterality Date    BIOPSY  12/19/2007    HX OTHER SURGICAL  11/2014    right knee    HX UROLOGICAL      Bladder CA         Family History:   Problem Relation Age of Onset    Hypertension Mother     Heart Disease Mother     Other Mother         Alzheimer's disease    Diabetes Neg Hx        Social History     Socioeconomic History    Marital status:      Spouse name: Not on file    Number of children: Not on file    Years of education: Not on file    Highest education level: Not on file   Occupational History    Not on file   Social Needs    Financial resource strain: Not on file    Food insecurity:     Worry: Not on file     Inability: Not on file    Transportation needs:     Medical: Not on file     Non-medical: Not on file   Tobacco Use    Smoking status: Former Smoker    Smokeless tobacco: Never Used    Tobacco comment: quit smoking in 2000   Substance and Sexual Activity    Alcohol use: No     Alcohol/week: 0.0 standard drinks    Drug use: No    Sexual activity: Not Currently   Lifestyle    Physical activity:     Days per week: Not on file     Minutes per session: Not on file    Stress: Not on file   Relationships    Social connections:     Talks on phone: Not on file     Gets together: Not on file     Attends [employer]ical service: Not on file     Active member of club or organization: Not on file     Attends meetings of clubs or organizations: Not on file     Relationship status: Not on file    Intimate partner violence:     Fear of current or ex partner: Not on file     Emotionally abused: Not on file     Physically abused: Not on file     Forced sexual activity: Not on file   Other Topics Concern    Not on file   Social History Narrative    Not on file         ALLERGIES: Chlorhexidine towelette    Review of Systems   Unable to perform ROS: Dementia       Vitals:    12/31/19 1927 12/31/19 2127   BP: 141/69 146/90   Pulse: 68 61   Resp: 16 14   Temp: 97.9 °F (36.6 °C)    SpO2: 96% 98%   Weight: 116.1 kg (256 lb)    Height: 5' 9\" (1.753 m)             Physical Exam  Vitals signs and nursing note reviewed. Constitutional:       General: He is not in acute distress. Appearance: He is well-developed. HENT:      Head: Normocephalic. Eyes:      Conjunctiva/sclera: Conjunctivae normal.      Pupils: Pupils are equal, round, and reactive to light. Neck:      Musculoskeletal: Normal range of motion and neck supple. Cardiovascular:      Rate and Rhythm: Normal rate and regular rhythm. Heart sounds: Normal heart sounds. No murmur. Pulmonary:      Effort: Pulmonary effort is normal. No respiratory distress. Breath sounds: Normal breath sounds. No wheezing or rales. Chest:      Chest wall: No tenderness.    Abdominal:      General: Bowel sounds are normal. There is no distension. Palpations: Abdomen is soft. Tenderness: There is no tenderness. There is no rebound. Musculoskeletal: Normal range of motion. General: No tenderness. Skin:     General: Skin is warm and dry. Findings: No rash. Neurological:      Mental Status: He is alert and oriented to person, place, and time. Cranial Nerves: No cranial nerve deficit. Motor: No abnormal muscle tone. Coordination: Coordination normal.   Psychiatric:         Behavior: Behavior normal.         Thought Content: Thought content normal.         Judgment: Judgment normal.          MDM       Procedures    Hospital course: Patient was sedated with Xanax. After receiving 1 dose of Xanax, patient was alert and cooperative. Patient work-up for possible viral cause of altered mental status. Patient work-up was negative. Case discussed with wife. Patient wife was informed patient needed a higher dose of sedation to control his violent tendencies. He will be discharged back to nursing home. Wife agree with discharge management. Disp: D/C back to nursing home    Dictation disclaimer:  Please note that this dictation was completed with [employer], the computer voice recognition software. Quite often unanticipated grammatical, syntax, homophones, and other interpretive errors are inadvertently transcribed by the computer software. Please disregard these errors. Please excuse any errors that have escaped final proofreading.

## 2020-01-01 NOTE — ED NOTES
I called the crossings at [facility] and told ursula starkey that the pt was being returned to the facility. . she asked that the crew enter thru the front. .. verbal  Dc instructions given to the spouse. . written instructions and a script given to [facility] to give to nursing staff. ..

## 2020-01-01 NOTE — ED TRIAGE NOTES
Patient arrived via Medic for \"Patient not acting right\" at [facility]. Patient is oriented to Person and Place, Refuses to be touch, Refuses to allow his vital signs to be checked. States \"I do not want to be touched. \"    Medic states [facility] called 911 because patient was being aggressive with staff.

## 2020-01-06 PROBLEM — R41.0 CONFUSION: Status: ACTIVE | Noted: 2020-01-01

## 2020-01-06 PROBLEM — R09.02 HYPOXIA: Status: ACTIVE | Noted: 2020-01-01

## 2020-01-06 PROBLEM — J96.01 ACUTE RESPIRATORY FAILURE WITH HYPOXIA (HCC): Status: ACTIVE | Noted: 2020-01-01

## 2020-01-06 NOTE — ED NOTES
Noted pt had sudden drop in oxygen saturation down to 85 percent; pt noticeably more rhonchous/tachypneic. Pt suctioned with yankauer but no aspiration noted.   Oxygen increased to 4L NC.

## 2020-01-06 NOTE — ED TRIAGE NOTES
Fi02 to 50% per VO Dr Ryan Penaloza; pt 80 to 91 percent  Remains awake/alert/confused; repetetively asking if spouse has arrivaed yet. Calm affect. Able to converse with mask on without difficulty. Respirations regular/non labored, skin warm/dry. No distress noted at this time.

## 2020-01-06 NOTE — ED NOTES
Bedside Hand-off-Report given to oncoming shift RN Danna Yu), including  all care previously given. Addressed all questions asked by oncoming RN.

## 2020-01-06 NOTE — ED NOTES
Pt sleeping at this time.  Continues to wait for room assignment at [facility]

## 2020-01-06 NOTE — ED NOTES
Sats WNL with BIPAP; BIPAP removed and placed on trial of nasal cannula at 4Liters. Will continue to monitor closely. Continuing to suction orally, noting gurgling sounds to back of throat, unable to reach even with deep suction. Provider informed.

## 2020-01-06 NOTE — ED NOTES
Placed on BIPAP by Charge Nurse Duncan Ryan) with setting I/E 12 and 5 at 40% FIO2. Patient awake and denies any SOB at this time, asking for his spouse. HOB elevated. Sats WNL.

## 2020-01-06 NOTE — ED NOTES
ABG completed without difficulty; pt denies pain to ABG site/numbness. Airway remains pt; intermittent/slight cough without airway compromise. Intermittent suctioning with scant clear saliva. Pt resting with eyes closed; rouses easily to voice.   2x2 and Koban wrap to left wrist.

## 2020-01-06 NOTE — TELEPHONE ENCOUNTER
Wife called requesting call back from nurse or provider regarding patient's ams while taking his prescribed medication, also to adv that t is currently admitted into [facility].   please adv  [phone]

## 2020-01-06 NOTE — ED NOTES
Dr Ryan Penaloza notified of decrease in 02 with adventitious lung sounds; evaluating pt at bedside. Pt repositioned higher in bed and 02 increased to 5L NC. Pt remains awake/alert.

## 2020-01-06 NOTE — ED PROVIDER NOTES
Henok Rice. is a 78 y.o. male with a past medical history of diabetes, hypertension, arthritis, bladder cancer, and dementia coming in in respiratory distress. Patient was brought in by EMS. They report that the wife stated patient was complaining of chest pain earlier this morning and generalized weakness throughout the day. He reportedly had a fall and had to be helped by the ambulance crew. They report that since 4 PM he has had increasing shortness of breath and coughing up foamy sputum. On arrival EMS reports that his initial O2 sats were 71% on room air. Patient was placed on CPAP by EMS and was given 1 sublingual nitroglycerin. History otherwise unobtainable due to patient's respiratory status and dementia. Past Medical History:   Diagnosis Date    Anxiety     Arthritis     Cancer ([address] 2009    bladder     Carotid duplex 05/26/2011    No occlusive disease >49% bilaterally.     Chronic back pain 5/6/2010    Chronic traumatic encephalopathy     Dementia ([address]     Depression     he has taken Xanax for 16 years for this    Diabetes ([address]     Dizziness     ED (erectile dysfunction)     Fibrosis of knee joint     Peripatellar, right    Headache(784.0)     HTN (hypertension), benign 4/1/2010    Late onset Alzheimer's disease with behavioral disturbance ([address]     Left wrist pain     Lumbar spinal stenosis 9/25/2010    Neuropathy of lower extremity     Obesity     Osteoarthritis of both knees     Sleep apnea     cpap use    SOB (shortness of breath)     Vertigo, benign positional        Past Surgical History:   Procedure Laterality Date    BIOPSY  12/19/2007    HX OTHER SURGICAL  11/2014    right knee    HX UROLOGICAL      Bladder CA         Family History:   Problem Relation Age of Onset    Hypertension Mother     Heart Disease Mother     Other Mother         Alzheimer's disease    Diabetes Neg Hx        Social History     Socioeconomic History    Marital status:      Spouse name: Not on file    Number of children: Not on file    Years of education: Not on file    Highest education level: Not on file   Occupational History    Not on file   Social Needs    Financial resource strain: Not on file    Food insecurity:     Worry: Not on file     Inability: Not on file    Transportation needs:     Medical: Not on file     Non-medical: Not on file   Tobacco Use    Smoking status: Former Smoker    Smokeless tobacco: Never Used    Tobacco comment: quit smoking in 2000   Substance and Sexual Activity    Alcohol use: No     Alcohol/week: 0.0 standard drinks    Drug use: No    Sexual activity: Not Currently   Lifestyle    Physical activity:     Days per week: Not on file     Minutes per session: Not on file    Stress: Not on file   Relationships    Social connections:     Talks on phone: Not on file     Gets together: Not on file     Attends Shinto service: Not on file     Active member of club or organization: Not on file     Attends meetings of clubs or organizations: Not on file     Relationship status: Not on file    Intimate partner violence:     Fear of current or ex partner: Not on file     Emotionally abused: Not on file     Physically abused: Not on file     Forced sexual activity: Not on file   Other Topics Concern    Not on file   Social History Narrative    Not on file         ALLERGIES: Chlorhexidine towelette    Review of Systems   Unable to perform ROS: Dementia       Vitals:    01/06/20 0630 01/06/20 0640 01/06/20 0653 01/06/20 0655   BP: 146/70      Pulse: (!) 108 (!) 107 (!) 106 (!) 104   Resp: 21 18 18 14   Temp:       SpO2: 96% 97% 97% 97%   Weight:                Physical Exam  Vitals signs reviewed. Constitutional:       Appearance: Normal appearance. He is well-developed. HENT:      Head: Normocephalic and atraumatic. Eyes:      Extraocular Movements: Extraocular movements intact.       Conjunctiva/sclera: Conjunctivae normal.      Pupils: Pupils are equal, round, and reactive to light. Neck:      Musculoskeletal: Normal range of motion and neck supple. Vascular: JVD present. Cardiovascular:      Rate and Rhythm: Normal rate and regular rhythm. Heart sounds: S1 normal and S2 normal. No murmur. No friction rub. No gallop. Pulmonary:      Effort: Tachypnea, accessory muscle usage and respiratory distress present. Breath sounds: Decreased air movement present. Comments: Patient has crackles and rales throughout all lung fields. Abdominal:      General: There is no distension. Palpations: Abdomen is not rigid. Tenderness: There is no tenderness. Musculoskeletal: Normal range of motion. Right lower leg: Edema present. Left lower leg: Edema present. Skin:     General: Skin is warm. Findings: No rash. Neurological:      General: No focal deficit present. Mental Status: He is alert. Comments: Patient following commands and has no focal neurologic deficits. Spontaneously moving all extremities. MDM  Number of Diagnoses or Management Options  Acute respiratory failure with hypoxia [facility]):   Diagnosis management comments: David Huitron Sr. is a 78 y.o. male coming in by EMS for respiratory distress. Sats in the 70s on arrival and in the mid 80s on CPAP. Appears to be fluid overloaded. Not hypertensive, differential diagnosis includes CHF exacerbation, ACS leading to acute CHF/cardiogenic shock, obstructive shock, ARDS, among others. Will place on BiPAP, get cardiac work-up, chest x-ray, and full lab panel. Will obtain an ABG as well. With patient's borderline low blood pressure will not give additional nitro at this time as he may be preload dependent. Patient did eventually spike a fever. Patient's lactate was 2.0 after 1 L of normal saline. Was initially very careful with initial fluid boluses given the concern for CHF and pulmonary edema.   Will give the additional 1 L of normal saline with close observation. Patient continues to have copious foamy sputum production. He is, however, tolerating nasal cannula much better than the BiPAP with less agitation and O2 sats have maintained.          Procedures      Vitals:  Patient Vitals for the past 12 hrs:   Temp Pulse Resp BP SpO2   01/06/20 0655  (!) 104 14  97 %   01/06/20 0653  (!) 106 18  97 %   01/06/20 0640  (!) 107 18  97 %   01/06/20 0630  (!) 108 21 146/70 96 %   01/06/20 0612  (!) 110 20  97 %   01/06/20 0600  (!) 110 21 141/66 97 %   01/06/20 0538  (!) 108 22 153/61 96 %   01/06/20 0526 (!) 101.1 °F (38.4 °C) (!) 108 25  96 %   01/06/20 0515  (!) 107 23  96 %   01/06/20 0500  (!) 110 22 141/66 97 %   01/06/20 0430  (!) 110 23 160/66 97 %   01/06/20 0420  (!) 107 24  97 %   01/06/20 0400  (!) 109 22 162/66 97 %   01/06/20 0330  (!) 110 24 149/66 98 %   01/06/20 0300  (!) 111 24 157/67 98 %   01/06/20 0230  (!) 108 22 145/64 98 %   01/06/20 0209  (!) 102 24  99 %   01/06/20 0200  100 24 159/61 99 %   01/06/20 0157    157/65    01/06/20 0128  (!) 101 20  100 %   01/06/20 0109  (!) 103 28 (!) 134/96 99 %   01/06/20 0100  (!) 103 27 (!) 134/117 99 %   01/06/20 0047  (!) 103 21  100 %   01/06/20 0032  100 19  100 %   01/06/20 0021  88 22  99 %   01/06/20 0020  94 19  98 %   01/06/20 0015  91 29  97 %   01/06/20 0008  89 19  96 %   01/06/20 0000    (!) 137/93    01/05/20 2355  88 22  94 %   01/05/20 2347  91 21  (!) 84 %   01/05/20 2335  87 24  94 %   01/05/20 2331 97.1 °F (36.2 °C) 87 29 142/87 92 %   01/05/20 2315  84 28 158/78 (!) 86 %   01/05/20 2308  85 29  (!) 87 %   01/05/20 2300  83 19 153/78 97 %   01/05/20 2230  77 17 141/67 98 %   01/05/20 2210  77 21  98 %   01/05/20 2204  77 22  96 %   01/05/20 2203  77 28  97 %   01/05/20 2200  77 27 136/70 96 %   01/05/20 2153  75 19  100 %   01/05/20 2150  73 24  100 %   01/05/20 2140  77 25  100 %   01/05/20 2136  74 18  100 %   01/05/20 2130  74 17 129/69 98 %   01/05/20 2100  71 25 142/72 100 %   01/05/20 2051  69 16  99 %   01/05/20 2035 97.5 °F (36.4 °C) 67 20 144/69 100 %   01/05/20 2033  66 25  100 %   01/05/20 2030  71 19 144/69 97 %   01/05/20 2015  69 24  94 %   01/05/20 2006  67 18  90 %   01/05/20 2000  67 22 107/63 100 %   01/05/20 1950  71 16 106/57 (!) 87 %       Medications ordered:   Medications   sodium chloride 0.9 % bolus infusion 1,000 mL (has no administration in time range)   haloperidol lactate (HALDOL) injection 5 mg (5 mg IntraVENous Given 1/6/20 0051)   LORazepam (ATIVAN) injection 1 mg (1 mg IntraVENous Given 1/6/20 0123)   iopamidol (ISOVUE-370) 76 % injection 100 mL (100 mL IntraVENous Given 1/6/20 0116)   sodium chloride 0.9 % bolus infusion 1,000 mL (0 mL IntraVENous IV Completed 1/6/20 0400)   azithromycin (ZITHROMAX) 500 mg in  mL (500 mg IntraVENous Given 1/6/20 0555)   cefTRIAXone (ROCEPHIN) injection 1 g (1 g IntraVENous Given 1/6/20 0555)   ketorolac (TORADOL) injection 15 mg (15 mg IntraVENous Given 1/6/20 0554)         Lab findings:  Recent Results (from the past 12 hour(s))   EKG, 12 LEAD, INITIAL    Collection Time: 01/05/20  7:55 PM   Result Value Ref Range    Ventricular Rate 68 BPM    Atrial Rate 68 BPM    P-R Interval 162 ms    QRS Duration 80 ms    Q-T Interval 414 ms    QTC Calculation (Bezet) 440 ms    Calculated P Axis 18 degrees    Calculated R Axis 12 degrees    Calculated T Axis -33 degrees    Diagnosis       Normal sinus rhythm  Nonspecific T wave abnormality  Abnormal ECG  When compared with ECG of 31-DEC-2019 20:28,  No significant change was found     CBC WITH AUTOMATED DIFF    Collection Time: 01/05/20  8:05 PM   Result Value Ref Range    WBC 6.8 4.6 - 13.2 K/uL    RBC 4.32 (L) 4.70 - 5.50 M/uL    HGB 13.2 13.0 - 16.0 g/dL    HCT 40.9 36.0 - 48.0 %    MCV 94.7 74.0 - 97.0 FL    MCH 30.6 24.0 - 34.0 PG    MCHC 32.3 31.0 - 37.0 g/dL    RDW 14.7 (H) 11.6 - 14.5 %    PLATELET 278 604 - 898 K/uL    MPV 10.7 9.2 - 11.8 FL    NEUTROPHILS 69 40 - 73 %    LYMPHOCYTES 20 (L) 21 - 52 %    MONOCYTES 8 3 - 10 %    EOSINOPHILS 3 0 - 5 %    BASOPHILS 0 0 - 2 %    ABS. NEUTROPHILS 4.7 1.8 - 8.0 K/UL    ABS. LYMPHOCYTES 1.3 0.9 - 3.6 K/UL    ABS. MONOCYTES 0.5 0.05 - 1.2 K/UL    ABS. EOSINOPHILS 0.2 0.0 - 0.4 K/UL    ABS. BASOPHILS 0.0 0.0 - 0.1 K/UL    DF AUTOMATED     METABOLIC PANEL, COMPREHENSIVE    Collection Time: 01/05/20  8:05 PM   Result Value Ref Range    Sodium 148 (H) 136 - 145 mmol/L    Potassium 3.4 (L) 3.5 - 5.5 mmol/L    Chloride 109 100 - 111 mmol/L    CO2 30 21 - 32 mmol/L    Anion gap 9 3.0 - 18 mmol/L    Glucose 130 (H) 74 - 99 mg/dL    BUN 17 7.0 - 18 MG/DL    Creatinine 1.30 0.6 - 1.3 MG/DL    BUN/Creatinine ratio 13 12 - 20      GFR est AA >60 >60 ml/min/1.73m2    GFR est non-AA 53 (L) >60 ml/min/1.73m2    Calcium 9.2 8.5 - 10.1 MG/DL    Bilirubin, total 0.5 0.2 - 1.0 MG/DL    ALT (SGPT) 32 16 - 61 U/L    AST (SGOT) 36 10 - 38 U/L    Alk.  phosphatase 99 45 - 117 U/L    Protein, total 7.8 6.4 - 8.2 g/dL    Albumin 3.6 3.4 - 5.0 g/dL    Globulin 4.2 (H) 2.0 - 4.0 g/dL    A-G Ratio 0.9 0.8 - 1.7     MAGNESIUM    Collection Time: 01/05/20  8:05 PM   Result Value Ref Range    Magnesium 2.0 1.6 - 2.6 mg/dL   PROTHROMBIN TIME + INR    Collection Time: 01/05/20  8:05 PM   Result Value Ref Range    Prothrombin time 15.2 11.5 - 15.2 sec    INR 1.2 0.8 - 1.2     PTT    Collection Time: 01/05/20  8:05 PM   Result Value Ref Range    aPTT 25.3 23.0 - 36.4 SEC   CARDIAC PANEL,(CK, CKMB & TROPONIN)    Collection Time: 01/05/20  8:05 PM   Result Value Ref Range     (H) 39 - 308 U/L    CK - MB 4.9 (H) <3.6 ng/ml    CK-MB Index 0.9 0.0 - 4.0 %    Troponin-I, QT <0.02 0.0 - 0.045 NG/ML   NT-PRO BNP    Collection Time: 01/05/20  8:05 PM   Result Value Ref Range    NT pro-BNP 49 0 - 1,800 PG/ML   POC G3    Collection Time: 01/05/20  8:53 PM Result Value Ref Range    Device: BIPAP      FIO2 (POC) 50 %    pH (POC) 7.361 7.35 - 7.45      pCO2 (POC) 51.7 (H) 35.0 - 45.0 MMHG    pO2 (POC) 74 (L) 80 - 100 MMHG    HCO3 (POC) 29.5 (H) 22 - 26 MMOL/L    sO2 (POC) 94 92 - 97 %    Base excess (POC) 3 mmol/L    PEEP/CPAP (POC) 5 cmH2O    PIP (POC) 12      Allens test (POC) YES      Total resp. rate 28      Site LEFT RADIAL      Patient temp. 97.5      Specimen type (POC) ARTERIAL      Performed by Ras PANG/ [employer]    Collection Time: 01/06/20  3:00 AM   Result Value Ref Range    Color YELLOW      Appearance CLEAR      Specific gravity 1.030 1.005 - 1.030      pH (UA) 5.0 5.0 - 8.0      Protein NEGATIVE  NEG mg/dL    Glucose NEGATIVE  NEG mg/dL    Ketone NEGATIVE  NEG mg/dL    Bilirubin NEGATIVE  NEG      Blood NEGATIVE  NEG      Urobilinogen 1.0 0.2 - 1.0 EU/dL    Nitrites NEGATIVE  NEG      Leukocyte Esterase NEGATIVE  NEG     METABOLIC PANEL, BASIC    Collection Time: 01/06/20  5:05 AM   Result Value Ref Range    Sodium 149 (H) 136 - 145 mmol/L    Potassium 3.6 3.5 - 5.5 mmol/L    Chloride 112 (H) 100 - 111 mmol/L    CO2 30 21 - 32 mmol/L    Anion gap 7 3.0 - 18 mmol/L    Glucose 122 (H) 74 - 99 mg/dL    BUN 20 (H) 7.0 - 18 MG/DL    Creatinine 1.38 (H) 0.6 - 1.3 MG/DL    BUN/Creatinine ratio 14 12 - 20      GFR est AA >60 >60 ml/min/1.73m2    GFR est non-AA 50 (L) >60 ml/min/1.73m2    Calcium 8.1 (L) 8.5 - 10.1 MG/DL   CARDIAC PANEL,(CK, CKMB & TROPONIN)    Collection Time: 01/06/20  5:05 AM   Result Value Ref Range     (H) 39 - 308 U/L    CK - MB 2.0 <3.6 ng/ml    CK-MB Index 0.5 0.0 - 4.0 %    Troponin-I, QT <0.02 0.0 - 0.045 NG/ML   POC LACTIC ACID    Collection Time: 01/06/20  5:38 AM   Result Value Ref Range    Lactic Acid (POC) 2.01 (HH) 0.40 - 2.00 mmol/L       EKG interpretation by ED Physician: Sinus rhythm rate of 68 bpm.  Nonspecific T wave changes. No STEMI.     X-Ray, CT or other radiology findings or impressions:  CTA CHEST W OR W WO CONT   Final Result   IMPRESSION:   No evidence for pulmonary emboli. Central bronchial wall thickening. Emphysema. Borderline enlarged lower paraesophageal lymph node is stable. XR CHEST PORT   Final Result   Impression:   1. No acute process. Progress notes, Consult notes or additional Procedure notes:     Consult: Spoke to Dr. Christelle Bryant, hospitalist, regarding patient's symptoms, hypoxia, x-ray results, need for admission. She agrees to admit but requested a CT angiogram to rule out pulmonary embolism. Consult: Spoke to Dr. Christelle Bryant, hospitalist, regarding the results of patient's CT angiogram as well as his production of frothy sputum. She agrees to admit to stepdown unit and requested that we add pulmonology to the treatment team.      Critical Care Time:  The services I provided to this patient were to treat and/or prevent clinically significant deterioration that could result in the failure of one or more body systems and/or organ systems due to acute respiratory failure with hypoxia requiring BiPAP. Services included the following:  -reviewing nursing notes and old charts  -vital sign assessments  -direct patient care  -medication orders and management  -interpreting and reviewing diagnostic studies/labs  -re-evaluations  -documentation time    Aggregate critical care time was 50 minutes, which includes only time during which I was engaged in work directly related to the patient's care as described above, whether I was at bedside or elsewhere in the Emergency Department. It did not include time spent performing other reported procedures or the services of residents, students, nurses, or advance practice providers. Fadia Hernandez MD    4:52 AM      Reevaluation of patient:   I have reassessed the patient. Patient has been tolerating nasal cannula off BiPAP well.   Discussed all results with patient's wife as well as the need for admission and she verbalizes understanding agrees with this plan. Disposition:  Diagnosis:   1. Acute respiratory failure with hypoxia (HCC)        Disposition: Admit to stepdown    Follow-up Information    None          Patient's Medications   Start Taking    No medications on file   Continue Taking    ALPRAZOLAM (XANAX) 0.25 MG TABLET    TAKE 1 TABLET BY MOUTH THREE TIMES A DAY AS NEEDED    ALPRAZOLAM (XANAX) 1 MG TABLET    Take 1-2 Tabs by mouth every eight (8) hours as needed for Anxiety (aggitation, violent behavior). Max Daily Amount: 6 mg. AMLODIPINE (NORVASC) 10 MG TABLET    TAKE 1 TABLET BY MOUTH EVERY DAY    AMOXICILLIN-CLAVULANATE (AUGMENTIN) 875-125 MG PER TABLET    Take  by mouth every twelve (12) hours. ASPIRIN 81 MG CHEWABLE TABLET    Take 1 Tab by mouth daily. CHOLECALCIFEROL (VITAMIN D3) 1,000 UNIT TABLET    Take 1,000 Units by mouth daily. CICLOPIROX (LOPROX) 0.77 % TOPICAL CREAM    Apply pea size to affected area every day for 1 week at a time    CLOTRIMAZOLE-BETAMETHASONE (LOTRISONE) TOPICAL CREAM    Apply 2 g to affected area two (2) times a day. Indications: appply to affected skin bid and prn, apply pea size amount    DIVALPROEX SODIUM (DIVALPROEX PO)    Take 250 mg by mouth two (2) times a day. DONEPEZIL (ARICEPT) 23 MG FILM COATED TABLET        ESCITALOPRAM OXALATE (LEXAPRO) 10 MG TABLET    TAKE 1 TABLET BY MOUTH EVERY DAY    IRBESARTAN (AVAPRO) 150 MG TABLET    TAKE 1 TABLET BY MOUTH EVERY DAY AT NIGHT    LORAZEPAM (ATIVAN) 0.5 MG TABLET    Take  by mouth. MEMANTINE (NAMENDA) 10 MG TABLET    TAKE 1 TABLET BY MOUTH TWICE A DAY    METOPROLOL SUCCINATE (TOPROL-XL) 50 MG XL TABLET    TAKE 1 TABLET BY MOUTH ONCE DAILY    MULTIVITAMIN, TX-IRON-CA-MIN (THERA-M W/ IRON) 9 MG IRON-400 MCG TAB TABLET    Take 1 Tab by mouth daily.     OTHER    Compound Cream  Gabapentin 6%, Baclofen 2%, Cyclobenzaprine 2%, Lidocaine 2%, Flurbiprofen 10% with Ketamine 10%  Apply 1-2 grams (1-2 pumps) to affected area 3-4 times  120grams/2 refills    OTHER    Check CBC, CMP, Mg in 5 days, results to PCP immediately, Diagnosis- HTN    PANTOPRAZOLE (PROTONIX) 40 MG TABLET    TAKE 1 TABLET BY MOUTH EVERY DAY    QUETIAPINE (SEROQUEL) 50 MG TABLET    TAKE 1 TABLET BY MOUTH EVERYDAY AT BEDTIME    ROSUVASTATIN (CRESTOR) 5 MG TABLET    TAKE 1 TABLET BY MOUTH AT BEDTIME IF NEEDED   These Medications have changed    No medications on file   Stop Taking    No medications on file

## 2020-01-06 NOTE — ED NOTES
Patient agitated, pulling off BIPAP and combative with staff. Provider informed. BIPAP reapplied. 1:1 care at this time.

## 2020-01-06 NOTE — ED NOTES
BIPAP discontinued per MD with 2L NC application; pt remains awake/alert and able to maintain his oral secretions. Per spouse pt pt \"voice his not his own\". Pt converses without difficulty; scattered rhonchi noted throughout. Pt tolerating 2L NC well at this time. Suction performed with very scant clear saliva to posterior oropharynx; pt demonstrates no difficulty with swallowing at this time.

## 2020-01-06 NOTE — ED NOTES
Cleaned for incont of stool and urine. External condom cath applied in attempt to collect urine specimen. HOB elevated. Suctioned orally large amount clear mucous secretions.

## 2020-01-06 NOTE — ED NOTES
[facility] by Charge Nurse. . Darion PARSONSL. Will continue to monitor.

## 2020-01-06 NOTE — PROGRESS NOTES
[facility] admission  I received sign out from the [facility] Provider,   Dr Viviane De Leon. The patient is to be admitted to [facility]..

## 2020-01-06 NOTE — ED NOTES
Portable chest x ray at bedside. Pt remains awake/alert; airway remains patent although intermittent suctioning needed due to moderate amounts in clear saliva in anterior oral airway. Pt remains conversant without difficulty. MD notified; at this time BIPAP to be continued.   100 percent on 50 %

## 2020-01-07 PROBLEM — E87.0 HYPERNATREMIA: Status: ACTIVE | Noted: 2020-01-01

## 2020-01-07 NOTE — ED NOTES
Pt. Is awake and responsive to his normal mentation upon reassessment. Pt is dementia and confused at all times per his normal. After ativan, pt is much more calm and not attempting to climb out of bed as much. Pt was deep suctioned at this time and linens changed again with new linens and louise care provided. Pt tolerated all very well. Due to observed audible gurgles prior to suction, MD ordered albuterol and patient tolerated the treatment very well via mask and tolerated the suction well. MD notified and will continue to monitor. Pt otherwise stable at this time with good vital signs.

## 2020-01-07 NOTE — PROGRESS NOTES
Progress Note         Patient: Mena Goldberg MRN: [id number]  CSN: [id number]    YOB: 1940  Age: 78 y.o. Sex: male    DOA: 1/5/2020 LOS:  LOS: 1 day                    Subjective:     Vivi Ang Sr. is a 78 y.o. male with a PMHx of end-stage dementia, Type II DM, HTN, and arthritis who is admitted for multiple problems including hypoxia, hypernatremia, dementia and agitation . Responds to pain and loud voice   Unable to answer any questions   Wife present who provides HPI   She's having a hard time caring for him at home   Multiple falls lately, increasing combativeness       Objective:     Physical Exam:  Visit Vitals  /67   Pulse (!) 101   Temp 98.3 °F (36.8 °C)   Resp 15   Ht 5' 9\" (1.753 m)   Wt 113.4 kg (250 lb)   SpO2 98%   BMI 36.92 kg/m²        General:        No acute distress, responds to pain and voice     HEENT: NC, Atraumatic. Anicteric sclerae. Lungs: Coarse sound transmitted from upper airway, lungs clear   Heart:  RRR   Abdomen: Soft, Non distended, Non tender. +Bowel sounds, no HSM  Extremities: No c/c/e  Psych:   Agitated, confused   Neurologic:  Alert and oriented X0. Intake and Output:  Current Shift:  No intake/output data recorded.   Last three shifts:  01/05 1901 - 01/07 0700  In: 1000 [I.V.:1000]  Out: 925 [Urine:925]    Labs: Results:       Chemistry Recent Labs     01/07/20 0211 01/06/20  0505 01/05/20 2005   * 122* 130*   * 149* 148*   K 3.8 3.6 3.4*   * 112* 109   CO2 26 30 30   BUN 18 20* 17   CREA 1.39* 1.38* 1.30   CA 8.7 8.1* 9.2   AGAP 9 7 9   BUCR 13 14 13   AP 89  --  99   TP 8.0  --  7.8   ALB 3.4  --  3.6   GLOB 4.6*  --  4.2*   AGRAT 0.7*  --  0.9      CBC w/Diff Recent Labs     01/07/20 0211 01/05/20 2005   WBC 5.9 6.8   RBC 4.73 4.32*   HGB 14.6 13.2   HCT 44.3 40.9   * 160   GRANS  --  69   LYMPH  --  20*   EOS  --  3      Cardiac Enzymes Recent Labs     01/07/20 0211 01/06/20  0505   * 425*   CKND1 0.4 0.5      Coagulation Recent Labs     01/05/20 2005   PTP 15.2   INR 1.2   APTT 25.3       Lipid Panel Lab Results   Component Value Date/Time    Cholesterol, total 108 09/21/2019 10:00 AM    HDL Cholesterol 39 (L) 09/21/2019 10:00 AM    LDL, calculated 40.8 09/21/2019 10:00 AM    VLDL, calculated 28.2 09/21/2019 10:00 AM    Triglyceride 141 09/21/2019 10:00 AM    CHOL/HDL Ratio 2.8 09/21/2019 10:00 AM      BNP No results for input(s): BNPP in the last 72 hours. Liver Enzymes Recent Labs     01/07/20  0211   TP 8.0   ALB 3.4   AP 89   SGOT 42*      Thyroid Studies Lab Results   Component Value Date/Time    TSH 0.997 03/23/2011 02:12 PM                Assessment and Plan:     Yang Marquis Sr. is a 78 y.o. male with a PMHx of end-stage dementia, Type II DM, HTN, and arthritis who is admitted for multiple problems including hypoxia, hypernatremia, dementia and agitation. 1. Hypoxia- likely 2/2 difficulty clearing secretions   2. Concern for aspiration event/PNA   3. Hypernatremia   4. Dementia w/agitation   5. HTN   6. Type II DM- HbA1c 6.3 in 9/19   7. Dysphagia       Pt failed bedside swallow and is now NPO   Palliative consult   Cont D5 1/2 NS at 125 mL/hr   Reviewed repeat CXR  Will cont broad-spectrum abx for now   Chest PT per RT   Cont home meds   Restraints prn agitation   SLP eval, PT, OT        Case discussed with:  []Patient  [x]Family  [x]Nursing  [x]Case Management  DVT prophylaxis: Lovenox   Diet: NPO   Code Status: DNR   Disposition: Continue current care; >2 midnights         PAMELA Baker DO  1/7/2020

## 2020-01-07 NOTE — ED NOTES
Pt. Attempting to climb out of bed several times and is observed unsafe many times, MD notified and a tech asked to stay at bedside to ensure patient safety at this time. CN notified and aware of situation. Potential ED discharge for patient was also discussed with MD, MD stated he didn't feel comfortable with Discharging the patient at this time.

## 2020-01-07 NOTE — PROGRESS NOTES
Reason for Admission:   Acute respiratory failure with hypoxia (HCC) [J96.01]  Hypoxia [R09.02]  Confusion [R41.0]  Hypoxia [R09.02]  Hypernatremia [E87.0]               RRAT Score:     37             Resources/supports as identified by patient/family:       Top Challenges facing patient (as identified by patient/family and CM): Finances/Medication cost?     No   Transportation      Pt's wife  Support system or lack thereof? Family support  Living arrangements? Lives with his wife   Self-care/ADLs/Cognition? dependent        Current Advanced Directive/Advance Care Plan:   no                          Plan for utilizing home health:    no                      Likelihood of readmission:   HIGH    Transition of Care Plan:                    Initial assessment completed with patient's wife Maryann Reddy.  Cognitive status of patient: disoriented. Face sheet information confirmed:  yes. The patient's wife participate in his discharge plan and to receive any needed information. This patient lives in a  home with wife. Patient is not able to navigate steps as needed. Prior to hospitalization, patient was considered to be independent with ADLs/IADLS : no . If not independent,  patient needs assist with : dressing, bathing, food preparation, cooking, toileting and grooming    Patient has a current ACP document on file: no  The s transport will be available to transport patient  upon discharge. The patient already has [facility], W/C  medical equipment available in the home. Patient is not currently active with home health. Patient has not stayed in a skilled nursing facility or rehab. Was  stay within last 60 days : no. This patient is on dialysis :no    Currently, the discharge plan is LTC. Pt's wife stated pt has become aggressive and combative at home and she can no longer care for him. She wants long term care in a nursing facility. Pt does not have Medicaid.   She stated pt makes about $5,000/month. She tried applying for Medicaid before and was denied. Gave pt's wife list of Skilled Nursing Facilities. Informed her that Long term care depends on bed availability and the facility that can accommodate pt's needs. Also pt has to have medicaid or they will have to self pay. Pt's wife expressed understanding and stated it pt cannot be approved for Medicaid, she can afford to pay the facility $1500/month. Spoke with April in [address] to screen pt for Medicaid application. The patient's wife assists pt with his medications. Patient's current insurance is [facility].       Care Management Interventions  PCP Verified by CM: Yes(per pt's wife, pt saw his pcp in November)  Palliative Care Criteria Met (RRAT>21 & CHF Dx)?: No  Mode of Transport at Discharge: [facility]  Transition of Care Consult (CM Consult): Discharge Planning  Physical Therapy Consult: No  Occupational Therapy Consult: No  Speech Therapy Consult: No  Current Support Network: Lives with Spouse  Confirm Follow Up Transport: Family  The Plan for Transition of Care is Related to the Following Treatment Goals : long term care  The Patient and/or Patient Representative was Provided with a Choice of Provider and Agrees with the Discharge Plan?: Yes  Name of the Patient Representative Who was Provided with a Choice of Provider and Agrees with the Discharge Plan: Molly Henry of Choice List was Provided with Basic Dialogue that Supports the Patient's Individualized Plan of Care/Goals, Treatment Preferences and Shares the Quality Data Associated with the Providers?: Yes  Discharge Location  Discharge Placement: [address] [facility]        RICHARD Jain RN  Care Management  Pager: [phone]

## 2020-01-07 NOTE — ED NOTES
Pt. Observed at the edge of the bed with condom catheter pulled. Pt primary nurse at this time on the phone giving report. Lowell NICHOLSON notified and assisted with tech in moving patient back into bed and providing the patient with new linen and gown. Pt suctioned at this time and advised to remain in bed for his safety. MD notified.

## 2020-01-07 NOTE — PROGRESS NOTES
Problem: Falls - Risk of  Goal: *Absence of Falls  Description  Document Gracie Rosenbaum Fall Risk and appropriate interventions in the flowsheet. 1/7/2020 1743 by Joel Tuttle  Outcome: Progressing Towards Goal  Note: Fall Risk Interventions:  Mobility Interventions: Bed/chair exit alarm, Patient to call before getting OOB    Mentation Interventions: Adequate sleep, hydration, pain control, Bed/chair exit alarm, Door open when patient unattended, More frequent rounding, Reorient patient, Room close to nurse's station    Medication Interventions: Bed/chair exit alarm, Patient to call before getting OOB    Elimination Interventions: Bed/chair exit alarm, Call light in reach, Patient to call for help with toileting needs, Toileting schedule/hourly rounds    History of Falls Interventions: Bed/chair exit alarm, Door open when patient unattended, Room close to nurse's station      1/7/2020 1742 by Joel Tuttle  Outcome: Progressing Towards Goal  Note: Fall Risk Interventions:  Mobility Interventions: Bed/chair exit alarm, Patient to call before getting OOB    Mentation Interventions: Adequate sleep, hydration, pain control, Bed/chair exit alarm, Door open when patient unattended, More frequent rounding, Reorient patient, Room close to nurse's station    Medication Interventions: Bed/chair exit alarm, Patient to call before getting OOB    Elimination Interventions: Bed/chair exit alarm, Call light in reach, Patient to call for help with toileting needs, Toileting schedule/hourly rounds    History of Falls Interventions: Bed/chair exit alarm, Door open when patient unattended, Room close to nurse's station         Problem: Pressure Injury - Risk of  Goal: *Prevention of pressure injury  Description  Document David Scale and appropriate interventions in the flowsheet.   1/7/2020 1743 by Joel Tuttle  Outcome: Progressing Towards Goal  Note: Pressure Injury Interventions:  Sensory Interventions: Assess changes in LOC, Avoid rigorous massage over bony prominences, Check visual cues for pain, Discuss PT/OT consult with provider, Keep linens dry and wrinkle-free, Minimize linen layers, Monitor skin under medical devices, Pad between skin to skin, Pressure redistribution bed/mattress (bed type), Turn and reposition approx. every two hours (pillows and wedges if needed)    Moisture Interventions: Absorbent underpads, Assess need for specialty bed, Check for incontinence Q2 hours and as needed, Internal/External urinary devices, Limit adult briefs    Activity Interventions: Assess need for specialty bed, Pressure redistribution bed/mattress(bed type), PT/OT evaluation    Mobility Interventions: Assess need for specialty bed, Pressure redistribution bed/mattress (bed type), PT/OT evaluation, Turn and reposition approx. every two hours(pillow and wedges)    Nutrition Interventions: Document food/fluid/supplement intake, Discuss nutritional consult with provider, Offer support with meals,snacks and hydration    Friction and Shear Interventions: Lift sheet, Lift team/patient mobility team, Minimize layers, Transferring/repositioning devices             1/7/2020 1742 by Tena Cohen  Outcome: Progressing Towards Goal  Note: Pressure Injury Interventions:  Sensory Interventions: Assess changes in LOC, Avoid rigorous massage over bony prominences, Check visual cues for pain, Discuss PT/OT consult with provider, Keep linens dry and wrinkle-free, Minimize linen layers, Monitor skin under medical devices, Pad between skin to skin, Pressure redistribution bed/mattress (bed type), Turn and reposition approx.  every two hours (pillows and wedges if needed)    Moisture Interventions: Absorbent underpads, Assess need for specialty bed, Check for incontinence Q2 hours and as needed, Internal/External urinary devices, Limit adult briefs    Activity Interventions: Assess need for specialty bed, Pressure redistribution bed/mattress(bed type), PT/OT evaluation    Mobility Interventions: Assess need for specialty bed, Pressure redistribution bed/mattress (bed type), PT/OT evaluation, Turn and reposition approx.  every two hours(pillow and wedges)    Nutrition Interventions: Document food/fluid/supplement intake, Discuss nutritional consult with provider, Offer support with meals,snacks and hydration    Friction and Shear Interventions: Lift sheet, Lift team/patient mobility team, Minimize layers, Transferring/repositioning devices

## 2020-01-07 NOTE — ED NOTES
Pt. Reassessed at this time and was suctioned via suction Yankauer and deep suction. Pt tolerated well and is awake and alert to his normal mentation of dementia. Pt is now resting in bed comfortably in the POC with no new complaints. Pt is verbal and maintaining his own airway without complications. MD notified and ordered to give albuterol treatment. Will continue to monitor.

## 2020-01-07 NOTE — PROGRESS NOTES
SLP Note:     Bedside swallow evaluation attempted; however, unable to arouse pt for safe po trials. Recommend maintain NPO until evaluation completed as RN reporting difficulty with med pass. Discussed with pt's wife and RN. Will follow up later this date as pt able to participate.      Thank you for this referral,   Nessa Rdz M.S., [address] [facility]  Speech-Language Pathologist

## 2020-01-07 NOTE — ED NOTES
EMS arrived to transport patient and at this time, pt is now kicking his feet on the bed and voided all over, MD notified at this time and he ordered to give 5 of haldol IM at this time.

## 2020-01-07 NOTE — PROGRESS NOTES
0710: Received report from Bridger Xie RN using SBAR.    7397: morning medications administered. Pt noted to have poor swallow capabilities. Some medications seemed to be swallowed while others came back out of mouth during wet cough. Possible for aspirating. Chest sounds remain with scattered mild rhonchi and \"gurgle\" sound to throat. Pt O2 sats stable. Will suggest swallow study by Speech to MD during AM rounds. Will start pt as NPO until swallow efficacy determined. 1920: Bedside shift change report given to Maricruz Lindsay (oncoming nurse) by Yin Cornejo (offgoing nurse). Report included the following information SBAR, Intake/Output, MAR and Recent Results.

## 2020-01-07 NOTE — H&P
Admission History and Physical    Shira Self Sr. is a 78 y.o. male who is being admitted. Chief Complaint   Patient presents with    Shortness of Breath       Impression/Plan       78years old presented emergency department for hypoxia, shortness of breath, and cough with foamy secretions and gurgling  Suspect element of aspiration or difficulty clearing secretions  Will continue on Zosyn, despite the negative chest x-ray and CTA for infiltrate/consolidation, which could be secondary to dehydration as evidenced by hypernatremia of 149  We will repeat chest x-ray in a.m. Requested chest PT x4 and will need reassessment  He still has gurgly chest sounds and not able to cough effectively to clear secretions    Dehydration, with hypernatremia of 149  Hydrate with D5 half NS with potassium  Repeat BMP    Dementia, with agitation  PRN Haldol and benzodiazepines  Continue on home meds    Question of chest pain  Difficult to ascertain in setting  of his dementia  He had 2- sets of cardiac enzymes  Added 3d set    Hypertension  Continue home meds  Monitor blood pressure    Diabetes mellitus  Diabetic diet  Monitor blood glucose    History of bladder cancer per records  Outpatient follow-up    -Other medical problems as below  Continue home medications and outpatient follow-up    Patient is DNR as per ER documentation    Lovenox for DVT prophylaxis      HPI:    78years old with medical history significant for dementia, diabetes, hypertension, bladder cancer per records who was brought to [facility] ED on the evening of 1/5 from home with complaint of shortness of breath and having cough with foamy sputum and secretions and reported oxygen saturation of 71% by EMS. He was also reported to have had chest pain. The patient is not able to provide history because of his dementia, which was obtained from records. In the ED was initially hypoxic in the high 80s.   In the ED had CTA and chest x-ray which were negative for PE and for consolidation. His sodium was noted at 149. He received fluids with normal saline, suctioning, Haldol and benzodiazepines to control agitation. He was reported to have had increasing agitation and falls at home prior to admission with no reported major injuries. He received a dose of IV Rocephin and Zithromax in the ED. He was admitted for further management. Initially required BiPAP and was then weaned to nasal cannula oxygen which was discontinued with reported oxygen saturation in the high 90s. Past Medical History:   Diagnosis Date    Anxiety     Arthritis     Cancer ([facility] [address] 2009    bladder     Carotid duplex 05/26/2011    No occlusive disease >49% bilaterally.  Chronic back pain 5/6/2010    Chronic traumatic encephalopathy     Dementia ([facility] [address]     Depression     he has taken Xanax for 16 years for this    Diabetes ([facility] [address]     Dizziness     ED (erectile dysfunction)     Fibrosis of knee joint     Peripatellar, right    Headache(784.0)     HTN (hypertension), benign 4/1/2010    Late onset Alzheimer's disease with behavioral disturbance (HCC)     Left wrist pain     Lumbar spinal stenosis 9/25/2010    Neuropathy of lower extremity     Obesity     Osteoarthritis of both knees     Sleep apnea     cpap use    SOB (shortness of breath)     Vertigo, benign positional      Past Surgical History:   Procedure Laterality Date    BIOPSY  12/19/2007    HX OTHER SURGICAL  11/2014    right knee    HX UROLOGICAL      Bladder CA     Social history, lives with family at home    Family History   Problem Relation Age of Onset    Hypertension Mother     Heart Disease Mother     Other Mother         Alzheimer's disease    Diabetes Neg Hx      Allergies   Allergen Reactions    Chlorhexidine Towelette Itching       Home Medications:     No current facility-administered medications on file prior to encounter.       Current Outpatient Medications on File Prior to Encounter   Medication Sig Dispense Refill    divalproex sodium (DIVALPROEX PO) Take 250 mg by mouth two (2) times a day.  ALPRAZolam (XANAX) 0.25 mg tablet TAKE 1 TABLET BY MOUTH THREE TIMES A DAY AS NEEDED (Patient taking differently: 0.5 mg three (3) times daily as needed. TAKE 1 TABLET BY MOUTH THREE TIMES A DAY AS NEEDED) 90 Tab 2    ALPRAZolam (XANAX) 1 mg tablet Take 1-2 Tabs by mouth every eight (8) hours as needed for Anxiety (aggitation, violent behavior). Max Daily Amount: 6 mg. 20 Tab 0    rosuvastatin (CRESTOR) 5 mg tablet TAKE 1 TABLET BY MOUTH AT BEDTIME IF NEEDED 90 Tab 1    clotrimazole-betamethasone (LOTRISONE) topical cream Apply 2 g to affected area two (2) times a day. Indications: appply to affected skin bid and prn, apply pea size amount 30 g 2    amoxicillin-clavulanate (AUGMENTIN) 875-125 mg per tablet Take  by mouth every twelve (12) hours.  LORazepam (ATIVAN) 0.5 mg tablet Take  by mouth.  pantoprazole (PROTONIX) 40 mg tablet TAKE 1 TABLET BY MOUTH EVERY DAY 90 Tab 2    irbesartan (AVAPRO) 150 mg tablet TAKE 1 TABLET BY MOUTH EVERY DAY AT NIGHT 90 Tab 3    QUEtiapine (SEROQUEL) 50 mg tablet TAKE 1 TABLET BY MOUTH EVERYDAY AT BEDTIME 90 Tab 2    amLODIPine (NORVASC) 10 mg tablet TAKE 1 TABLET BY MOUTH EVERY DAY 30 Tab 5    escitalopram oxalate (LEXAPRO) 10 mg tablet TAKE 1 TABLET BY MOUTH EVERY DAY 90 Tab 2    memantine (NAMENDA) 10 mg tablet TAKE 1 TABLET BY MOUTH TWICE A  Tab 2    ciclopirox (LOPROX) 0.77 % topical cream Apply pea size to affected area every day for 1 week at a time 90 g 2    metoprolol succinate (TOPROL-XL) 50 mg XL tablet TAKE 1 TABLET BY MOUTH ONCE DAILY 90 Tab 1    cholecalciferol (VITAMIN D3) 1,000 unit tablet Take 1,000 Units by mouth daily.  donepezil (ARICEPT) 23 mg film coated tablet   0    aspirin 81 mg chewable tablet Take 1 Tab by mouth daily.  30 Tab 0    OTHER Check CBC, CMP, Mg in 5 days, results to PCP immediately, Diagnosis- HTN 1 Each 0  multivitamin, tx-iron-ca-min (THERA-M W/ IRON) 9 mg iron-400 mcg tab tablet Take 1 Tab by mouth daily.  OTHER Compound Cream  Gabapentin 6%, Baclofen 2%, Cyclobenzaprine 2%, Lidocaine 2%, Flurbiprofen 10% with Ketamine 10%  Apply 1-2 grams (1-2 pumps) to affected area 3-4 times  120grams/2 refills         Review of Systems:   Unable to obtain from patient secondary dementia    Physical Assessment:     Visit Vitals  /67 (BP 1 Location: Left arm)   Pulse (!) 105   Temp 98.2 °F (36.8 °C)   Resp 19   Ht 5' 9\" (1.753 m)   Wt 113.4 kg (250 lb)   SpO2 97%   BMI 36.92 kg/m²     Constitutional: The patient is alert. No distress. Eyes: No scleral icterus. Neck: No JVD present. Cardiovascular: Regular rhythm. Exam reveals no gallop and no friction rub. Pulmonary/Chest: Has gurgling uppercase sounds from secretions, effort normal. No stridor. No respiratory distress. has no wheezes. has no rales. Abdominal: Soft. Bowel sounds are normal. exhibits no distension. No tenderness. No rebound and no guarding. Musculoskeletal:Normal strength. exhibits no tenderness. Neurological:alert, no facial asymmetry, no facial weakness  Skin: Skin is warm and dry.   Psychiatric: Confused, was agitated at times    Recent Results (from the past 24 hour(s))   URINALYSIS W/ RFLX MICROSCOPIC    Collection Time: 01/06/20  3:00 AM   Result Value Ref Range    Color YELLOW      Appearance CLEAR      Specific gravity 1.030 1.005 - 1.030      pH (UA) 5.0 5.0 - 8.0      Protein NEGATIVE  NEG mg/dL    Glucose NEGATIVE  NEG mg/dL    Ketone NEGATIVE  NEG mg/dL    Bilirubin NEGATIVE  NEG      Blood NEGATIVE  NEG      Urobilinogen 1.0 0.2 - 1.0 EU/dL    Nitrites NEGATIVE  NEG      Leukocyte Esterase NEGATIVE  NEG     METABOLIC PANEL, BASIC    Collection Time: 01/06/20  5:05 AM   Result Value Ref Range    Sodium 149 (H) 136 - 145 mmol/L    Potassium 3.6 3.5 - 5.5 mmol/L    Chloride 112 (H) 100 - 111 mmol/L    CO2 30 21 - 32 mmol/L    Anion gap 7 3.0 - 18 mmol/L    Glucose 122 (H) 74 - 99 mg/dL    BUN 20 (H) 7.0 - 18 MG/DL    Creatinine 1.38 (H) 0.6 - 1.3 MG/DL    BUN/Creatinine ratio 14 12 - 20      GFR est AA >60 >60 ml/min/1.73m2    GFR est non-AA 50 (L) >60 ml/min/1.73m2    Calcium 8.1 (L) 8.5 - 10.1 MG/DL   CARDIAC PANEL,(CK, CKMB & TROPONIN)    Collection Time: 01/06/20  5:05 AM   Result Value Ref Range     (H) 39 - 308 U/L    CK - MB 2.0 <3.6 ng/ml    CK-MB Index 0.5 0.0 - 4.0 %    Troponin-I, QT <0.02 0.0 - 0.045 NG/ML   POC LACTIC ACID    Collection Time: 01/06/20  5:38 AM   Result Value Ref Range    Lactic Acid (POC) 2.01 (HH) 0.40 - 2.00 mmol/L   POC LACTIC ACID    Collection Time: 01/06/20  9:26 AM   Result Value Ref Range    Lactic Acid (POC) 1.65 0.40 - 2.00 mmol/L         Diagnostic Studies:  CXR:   No acute process. CTA Chest:  IMPRESSION:  No evidence for pulmonary emboli.      Central bronchial wall thickening.     Emphysema.     Borderline enlarged lower paraesophageal lymph node is stable.

## 2020-01-07 NOTE — PROGRESS NOTES
TRANSFER - IN REPORT:    Verbal report received from [facility] on Sandi De Guzman Sr.  being received from [facility] for routine progression of care      Report consisted of patients Situation, Background, Assessment and   Recommendations(SBAR). Information from the following report(s) SBAR was reviewed with the receiving nurse. Opportunity for questions and clarification was provided. Assessment completed upon patients arrival to unit and care assumed. 0045: Assumed patient care from [facility]. Patient is alert and oriented to person,but disoriented to place, time and situation. Respiratory status is stable on 4L via nasal cannula. Vital signs are stable. MEWS score is a four due to patient's level of consciousness. The hospitalist is aware. Patient denies any pain, discomfort, nausea vomiting dizziness or anxiety. White board and fall card is updated. Bed is locked and in lowest position. Call bell, water and personal belongings are within reach. 0330: Patient became combative with staff, attempted to remove his  IVs and oxygen and repeatedly get out of bed. Patient was placed in non-violent wrist restraints. 0700: Patient had an uneventful shift. Respiratory status, vital signs and MEWS score remained stable. Patient was resting quietly with no signs of distress noted. Bed locked and in lowest position. Call bell water and personal belongings were within reach. Patient had no questions, comments or concerns after bedside shift report.  Bedside report given to ZEN BARRON [facility] RJOSE LUIS

## 2020-01-07 NOTE — ROUTINE PROCESS
TRANSFER - OUT REPORT:    Verbal report given to Bertha Tapia RN(name) on [facility].  being transferred [place] [address](unit) for routine progression of care       Report consisted of patients Situation, Background, Assessment and   Recommendations(SBAR). Information from the following report(s) SBAR, ED Summary, Procedure Summary, Intake/Output, MAR, Accordion, Recent Results, Med Rec Status, Cardiac Rhythm NSR-ST and Alarm Parameters  was reviewed with the receiving nurse. Lines:   Peripheral IV 01/05/20 Left Antecubital (Active)   Site Assessment Clean, dry, & intact 1/5/2020  8:00 PM   Phlebitis Assessment 0 1/5/2020  8:00 PM   Infiltration Assessment 0 1/5/2020  8:00 PM   Dressing Status Clean, dry, & intact 1/5/2020  8:00 PM   Dressing Type Transparent 1/5/2020  8:00 PM   Hub Color/Line Status Flushed 1/5/2020  8:00 PM       Peripheral IV 01/05/20 Right Antecubital (Active)   Site Assessment Clean, dry, & intact 1/5/2020  8:00 PM   Phlebitis Assessment 0 1/5/2020  8:00 PM   Infiltration Assessment 0 1/5/2020  8:00 PM   Dressing Type Transparent 1/5/2020  8:00 PM        Opportunity for questions and clarification was provided.       Patient transported with:   Monitor  O2 @ 2 liters

## 2020-01-07 NOTE — ED NOTES
Pt voided all over the linen and linen was changed and louise care provided. Pt now on stretcher for transport.

## 2020-01-08 PROBLEM — R13.10 DYSPHAGIA: Status: ACTIVE | Noted: 2020-01-01

## 2020-01-08 NOTE — PROGRESS NOTES
Problem: Dysphagia (Adult)  Goal: *Acute Goals and Plan of Care (Insert Text)  Description  Patient will:  1. Tolerate PO trials with 0 s/s overt distress in 4/5 trials  2. Utilize compensatory swallow strategies/maneuvers (decrease bite/sip, size/rate, alt. liq/sol) with min cues in 4/5 trials  3. Perform oral-motor/laryngeal exercises to increase oropharyngeal swallow function with min cues  4. Complete an objective swallow study (i.e., MBSS) to assess swallow integrity, r/o aspiration, and determine of safest LRD, min A as indicated/ordered by MD     Recommend:   NPO   Strict aspiration precautions (HOB >30 degrees at all times, Oral care TID)  May benefit from palliative care consult to address ? alternative means of nutrition or comfort feeds      Outcome: Progressing Towards Goal    SPEECH LANGUAGE PATHOLOGY BEDSIDE SWALLOW EVALUATION/TREATMENT    Patient: Cezar Lopez Sr. (75 y.o. male)  Date: 1/7/2020  Primary Diagnosis: Acute respiratory failure with hypoxia (HCC) [J96.01]  Hypoxia [R09.02]  Confusion [R41.0]  Hypoxia [R09.02]  Hypernatremia [E87.0]  Precautions: Aspiration     PLOF: As per H&P    ASSESSMENT :  Based on the objective data described below, the patient presents with severe oral and suspected severe pharyngeal dysphagia. Wife present for most of evaluation, reporting good appetite and denying dysphagia prior to admission. Pt briefly opening eyes to stimulus, unable to follow commands which pt's wife reports is baseline. Oral mech exam unremarkable. Pt with gurgled/wet/breathy vocal quality at baseline; suspect poor secretion management. Pt unable to propel ice chip bolus despite cold spoon depression and max multi-modal cues; removed by SLP. Thorough oral care performed via toothette with pt tolerating with no difficulties. Recommend NPO; may benefit from palliative care consult to address feeding options (comfort vs alternative means).       TREATMENT :  Skilled therapy initiated; Educated pt's wife with regard to diet recs, aspiration risk, aspiration precautions, feeding options (comfort vs alternative means). Pt's wife verbalized understanding. Will continue to follow for further dysphagia management. D/w RN. Patient will benefit from skilled intervention to address the above impairments. Patient's rehabilitation potential is considered to be Guarded   Factors which may influence rehabilitation potential include:   []            None noted  [x]            Mental ability/status  [x]            Medical condition  []            Home/family situation and support systems  [x]            Safety awareness  []            Pain tolerance/management  []            Other:      PLAN :  Recommendations and Planned Interventions:  As above   Frequency/Duration: Patient will be followed by speech-language pathology 1-2 times per day/4-7 days per week to address goals. Discharge Recommendations: To Be Determined     SUBJECTIVE:   Patient stated Shelbie English. OBJECTIVE:     Past Medical History:   Diagnosis Date    Anxiety     Arthritis     Cancer ([facility] [address] 2009    bladder     Carotid duplex 05/26/2011    No occlusive disease >49% bilaterally.     Chronic back pain 5/6/2010    Chronic traumatic encephalopathy     Dementia ([facility] [address]     Depression     he has taken Xanax for 16 years for this    Diabetes [facility])     Dizziness     ED (erectile dysfunction)     Fibrosis of knee joint     Peripatellar, right    Headache(784.0)     HTN (hypertension), benign 4/1/2010    Late onset Alzheimer's disease with behavioral disturbance ([address]     Left wrist pain     Lumbar spinal stenosis 9/25/2010    Neuropathy of lower extremity     Obesity     Osteoarthritis of both knees     Sleep apnea     cpap use    SOB (shortness of breath)     Vertigo, benign positional      Past Surgical History:   Procedure Laterality Date    BIOPSY  12/19/2007    HX OTHER SURGICAL  11/2014    right knee    HX UROLOGICAL      Bladder CA     Prior Level of Function/Home Situation:  Home Situation  Home Environment: Long term care  One/Two Story Residence: One story  Living Alone: No  Support Systems: Long term acute care  Patient Expects to be Discharged to[de-identified] Other (comment)  Current DME Used/Available at Home: None  Diet prior to admission: Regular/thin  Current Diet:  Regular/thin; recommend change to NPO   Cognitive and Communication Status:  Neurologic State: Agitated, Confused, Eyes open to stimulus, Irritable  Orientation Level: Unable to verbalize  Cognition: Unable to assess (comment)  Oral Assessment:  Oral Assessment  Labial: No impairment  Dentition: Intact  Oral Hygiene: Fair  Lingual: No impairment  Velum: No impairment  Mandible: No impairment  P.O. Trials:  Patient Position: [facility] at [facility]  Vocal quality prior to P.O.: Wet;Breathy;Low volume(Gurgled )  Consistency Presented: Ice chips  How Presented: SLP-fed/presented;Spoon  Bolus Acceptance: Impaired  Bolus Formation/Control: Impaired  Type of Impairment: Incomplete  Propulsion: Absent  Oral Phase Severity: Severe  Pharyngeal Phase Severity : Severe    PAIN:  Start of Eval: unable to report   End of Eval: unable to report      After treatment:   []            Patient left in no apparent distress sitting up in chair  [x]            Patient left in no apparent distress in bed  [x]            Call bell left within reach  [x]            Nursing notified  []            Family present  []            Caregiver present  []            Bed alarm activated    COMMUNICATION/EDUCATION:   [x]            Aspiration precautions; swallow safety; compensatory techniques. [x]            Patient/family have participated as able in goal setting and plan of care. []            Patient/family agree to work toward stated goals and plan of care. []            Patient understands intent and goals of therapy; neutral about participation.   []            Patient unable to participate in goal setting/plan of care; educ ongoing with interdisciplinary staff  [x]         Posted safety precautions in patient's room.     Thank you for this referral,  Bob Adamson M.S., [id number] [facility]  Speech-Language Pathologist

## 2020-01-08 NOTE — PROGRESS NOTES
Kinetic Dosing- Initial Progress Note    Pharmacy Consult ordered by Dr. Maicol Wilkinson     Indication: CAP    Patient clinical status and labs ordered/reviewed. Pt Weight Weight: 113.4 kg (250 lb)   Serum Creatinine Lab Results   Component Value Date/Time    Creatinine 1.48 (H) 01/08/2020 02:43 AM       Creatinine Clearance Estimated Creatinine Clearance: 50.3 mL/min (A) (based on SCr of 1.48 mg/dL (H)). BUN Lab Results   Component Value Date/Time    BUN 14 01/08/2020 02:43 AM       WBC Lab Results   Component Value Date/Time    WBC 5.6 01/08/2020 02:43 AM      Temperature Temp: (!) 103 °F (39.4 °C)(Payal RN Notified)     HR Pulse (Heart Rate): (!) 110     BP BP: 139/77           Kinetic Dosing Parameters:   Vd = 94L     K = .022 hr-1              t ½ = 30h    Drug Levels:   Vancomycin    No results for input(s): VANCP, VANCT, VANCR, VANRA in the last 72 hours. Gentamicin   No results for input(s): GENP, GENT in the last 72 hours. No lab exists for component:  GENR   Tobramycin   No results for input(s): TOBP, TOBT, TOBR in the last 72 hours. Amikacin   No results for input(s): Lanis Solian in the last 72 hours.     No lab exists for component:  AYANNA Oneal DAMIKR     Dose for naïve patient was initiated at: Vancomycin 2500mg x1 then 1250mg q24h    Continue to monitor    Sign: [facility]  Date: 1/8/2020  Time: 1:16 PM

## 2020-01-08 NOTE — PROGRESS NOTES
Mews elevated temperature 103. 0 F Dr Brenda Lugo made aware new orders given pt remain lethargic suctioning oral airway with Yankauer thick pink tinge secretion maintained HOB at 40      1144 Tylenol 650 mg suppository will recheck temp in one hour    1157 portable chest x ray    1240 rechecked temp 103.3 F removed blanket and ice packs applied under arm pits     1243 blood culture drawn awaiting results    1324 urine sent to lab for testing will continue to monitor    1449 vancomycin hung rechecked temp 99.1 F family at bedside visiting will continue to monitor labs and vital signs

## 2020-01-08 NOTE — PROGRESS NOTES
Progress Note         Patient: Tia Berry MRN: [id number]  CSN: [id number]    YOB: 1940  Age: 78 y.o. Sex: male    DOA: 1/5/2020 LOS:  LOS: 2 days                    Subjective:     Truman Ang Sr. is a 78 y.o. male with a PMHx of end-stage dementia, Type II DM, HTN, and arthritis who is admitted for multiple problems including hypoxia, hypernatremia, dementia and agitation . Responds to pain and loud voice   Unable to answer any questions   Failed bedside swallow yesterday     No change in mental status overnight   Now with fevers despite abx, worsening respiratory status, rhonchorus lungs and CXR showing increased R sided infiltrate     Wife present at bedside   Discussed NG tube, aspiration PNA, end-stage dementia   Palliative met with family today  No NG tube, hospice consult       Objective:     Physical Exam:  Visit Vitals  /77 (BP 1 Location: Right arm, BP Patient Position: At rest)   Pulse (!) 110   Temp (!) 103 °F (39.4 °C)   Resp 20   Ht 5' 9\" (1.753 m)   Wt 113.4 kg (250 lb)   SpO2 90%   BMI 36.92 kg/m²        General:        No acute distress, responds to pain and voice     HEENT: NC, Atraumatic. Anicteric sclerae. Lungs: Coarse sound transmitted from upper airway, decreased sounds RLL with scattered rhonchi   Heart:  RRR   Abdomen: Soft, Non distended, Non tender. +Bowel sounds, no HSM  Extremities: No c/c/e  Psych:   Agitated, confused   Neurologic:  Alert and not oriented      Intake and Output:  Current Shift:  No intake/output data recorded.   Last three shifts:  01/06 1901 - 01/08 0700  In: 1300 [I.V.:1300]  Out: 2025 [Urine:2025]    Labs: Results:       Chemistry Recent Labs     01/08/20  0243 01/07/20  0211 01/06/20  0505 01/05/20 2005   * 104* 122* 130*   * 147* 149* 148*   K 3.4* 3.8 3.6 3.4*   * 112* 112* 109   CO2 26 26 30 30   BUN 14 18 20* 17   CREA 1.48* 1.39* 1.38* 1.30   CA 8.3* 8.7 8.1* 9.2   AGAP 7 9 7 9   BUCR 9* 13 14 13   AP  --  89  --  99   TP  --  8.0  --  7.8   ALB  --  3.4  --  3.6   GLOB  --  4.6*  --  4.2*   AGRAT  --  0.7*  --  0.9      CBC w/Diff Recent Labs     01/08/20  0243 01/07/20 0211 01/05/20 2005   WBC 5.6 5.9 6.8   RBC 4.21* 4.73 4.32*   HGB 12.8* 14.6 13.2   HCT 38.7 44.3 40.9   * 128* 160   GRANS 67  --  69   LYMPH 24  --  20*   EOS 0  --  3      Cardiac Enzymes Recent Labs     01/07/20 0211 01/06/20  0505   * 425*   CKND1 0.4 0.5      Coagulation Recent Labs     01/05/20 2005   PTP 15.2   INR 1.2   APTT 25.3       Lipid Panel Lab Results   Component Value Date/Time    Cholesterol, total 108 09/21/2019 10:00 AM    HDL Cholesterol 39 (L) 09/21/2019 10:00 AM    LDL, calculated 40.8 09/21/2019 10:00 AM    VLDL, calculated 28.2 09/21/2019 10:00 AM    Triglyceride 141 09/21/2019 10:00 AM    CHOL/HDL Ratio 2.8 09/21/2019 10:00 AM      BNP No results for input(s): BNPP in the last 72 hours. Liver Enzymes Recent Labs     01/07/20 0211   TP 8.0   ALB 3.4   AP 89   SGOT 42*      Thyroid Studies Lab Results   Component Value Date/Time    TSH 0.997 03/23/2011 02:12 PM                Assessment and Plan:     Yang Marquis Sr. is a 78 y.o. male with a PMHx of end-stage dementia, Type II DM, HTN, and arthritis who is admitted for multiple problems including hypoxia, hypernatremia, dementia and agitation. 1. Acute hypoxic respiratory failure- likely 2/2 difficulty clearing secretions and #2    2. Aspiration PNA   3. Hypernatremia   4. Dementia w/agitation   5. HTN   6. Type II DM- HbA1c 6.3 in 9/19   7.  Dysphagia       Pt failed bedside swallow and is now NPO   Palliative consulted today; met with family   No NG tube  Hospice consulted   Cont D5 1/2 NS with K+ at 125 mL/hr   Reviewed repeat CXR  FU blood cx's   Will cont broad-spectrum abx for now   Chest PT per RT   Cont home meds   Restraints prn agitation   SLP tita, PT, OT        Case discussed with:  []Patient  [x]Family  [x]Nursing  [x]Case Management  DVT prophylaxis: Lovenox   Diet: NPO   Code Status: DNR   Disposition: Continue current care; >2 midnights         H.  Jean Baker DO  1/8/2020

## 2020-01-08 NOTE — HOSPICE
Referral received. Chart review in process. Call placed. No answer. Mailbox full could not leave VM. [facility]. Please contact [phone] with any questions or concerns.             VIRIDIANA UrbanN, RN  Clinical Coordinator  [place] [id number] [facility], [address] [place] [address] Str.  [phone]

## 2020-01-08 NOTE — PROGRESS NOTES
responded to page to see patient. Provided pastoral care. Wife and her brother were present. She discussed that she had received \"bad\" news from Doctor this morning. She is in the process of talking to children, so she can make healthcare decisions for , with the family in agreement. Will continue to follow and provide care on an as needed/requested basis.     [address] [place] [address]  [phone] n/a

## 2020-01-08 NOTE — CDMP QUERY
Patient admitted with possible aspiration pneumonia  , noted to have resp difficulty requiring   BIPAP. Virginie Cardenas If possible, please document in progress notes and d/c summary if you are evaluating and/or treating any of the following:         Acute Hypoxemic Respiratory Failure       Acute Hypercapnic Respiratory Failure       hypoxia only        Other Explanation of clinical findings        Clinically Undetermined (no explanation for clinical findings)    The medical record reflects the following:         Risk Factors: age, cancer, dementia       Clinical Indicators: SOB    EMS reports that his initial O2 sats were 71% on room air, placed on CPAP only up to mid 80\"s  Per ER     Pulmonary:      Effort: Tachypnea, accessory muscle usage and respiratory distress present  PH   7.361;   PCO2   51.7;   PO2   74;    sat 94%   BIPAP    . Treatment: placed on   BIPAP    then down to 2L , drop in oxygen saturation down to 85 percent; pt noticeably more rhonchous/tachypneic. Pt suctioned with yankauer but no aspiration noted. Oxygen increased to 4L NC.       decrease in 02 with adventitious lung sounds; evaluating pt at bedside. Pt repositioned higher in bed and 02 increased to 5L NC  0006  1/6- placed back on BIPAP          REFERENCE:   Hypoxemic respiratory failure is characterized by a PaO2 less than 60 mmHg (or 10 mmHg below COPD patients baseline) and a normal or low arterial PaCO2. Hypercapnic respiratory failure is characterized by a PaCO2 higher than 50 mmHg (or 10 mmHG above COPD patients baseline).     Acute Respiratory Failure indicators include:   - Respirations <12 or >25   - Air hunger   - Use of accessory muscles of respiration   - Inability to speak in full sentences   - Cyanosis     AND    - Pulse ox <90% RA or <95% on O2   - pH <7.35 or >7.45   - pO2 < 60 mm Hg (or 10mm below COPD patient's baseline)   - pCO2 >50mm Hg (or 10mm above COPD patient's baseline)     Thank you,     Anne Booth RN CCDS  x [phone]

## 2020-01-08 NOTE — PROGRESS NOTES
Speech Therapy Note:     Follow up attempted. Could not progress with ST intervention because patient:       [x]  Lethargic, unable to be alerted enough for safe PO intake  []  Refused participation  []  Off the unit  []  NPO for procedure  [x]  Other: Requiring deep suctioning by RN due to poor secretion management       SLP will re-attempt treatment as schedule permits. D/w RN.      Teodoro Garcia M.S., [address] [facility]  Speech-Language Pathologist

## 2020-01-08 NOTE — PROGRESS NOTES
Lab called K level of 2.5. notified Dr Tiana Guerrero. Stated he would place some orders for replacement.

## 2020-01-08 NOTE — PROGRESS NOTES
Palliative NP Delicia Garcia and I met Mr Lawrence Khan spouse Geovanna Mckeon and her brother Virgilio Castanon who are at bedside. Mr Erickson Bass appears to be asleep and sounds very gurgly. Virgilio Castanon recognizes NP from her help with another family member. Compassionate support offered as team introduced and as Geovanna Mckeon shared a little about her spouse of 64 years, his dementia diagnosis 25 years ago, and how difficult it has been to care for him at home recently. Sigrid Vicente has been undergoing radiation treatment and placed Mr Erickson Bass for respite care at the [facility] before he came home \"and fell 5 times in a week. \")    Geovanna Mike and Virgilio Castanon expressed understanding about dementia's progression and state that they do not want a feeding tube to prolong Mr Lawrence Khan \"misery. \" Mr Erickson Bass had recently expressed to them that he wants to be a DNR. Options for care were discussed and hospice support at home was introduced. Geovanna Mckeon is willing to have hospice liaison come and talk with them. Questions were answered. Geovanna Mckeon and Virgilio Kina share their gratitude for visit and support. Team will follow up tomorrow to continue support, complete paperwork as needed, and answer any questions about plan of care.     [address] [place] [address] E  [phone]

## 2020-01-08 NOTE — PROGRESS NOTES
Received in bed asleep. Responds to tactile stimuli. in no acute distress.  Bed low, siderailsx3 up, call bell near pt

## 2020-01-09 PROBLEM — J69.0 ASPIRATION PNEUMONIA (HCC): Status: ACTIVE | Noted: 2020-01-01

## 2020-01-09 NOTE — PROGRESS NOTES
Progress Note         Patient: Renee Dan MRN: [id number]  CSN: [id number]    YOB: 1940  Age: 78 y.o. Sex: male    DOA: 1/5/2020 LOS:  LOS: 3 days                    Subjective:     Leroy Ang Sr. is a 78 y.o. male with a PMHx of end-stage dementia, Type II DM, HTN, and arthritis who is admitted for multiple problems including hypoxia, hypernatremia, dementia and agitation . Responds to pain and loud voice   Unable to answer any questions   Failed bedside swallow    No change in mental status overnight   Now with fevers despite abx, worsening respiratory status, rhonchorus lungs and CXR showing increased R sided infiltrate     Took a significant turn for the worse this am   Frothy pink sputum, respiratory distress with tachypnea and hypoxia   Placed on NRB, IV lasix given with some improvement     Met with family and hospice at bedside   Now comfort care     Objective:     Physical Exam:  Visit Vitals  /81 (BP 1 Location: Right arm, BP Patient Position: At rest)   Pulse 100   Temp (!) 101.6 °F (38.7 °C)   Resp 19   Ht 5' 9\" (1.753 m)   Wt 113.4 kg (250 lb)   SpO2 100%   BMI 36.92 kg/m²        General:        Acute respiratory distress, responds to pain     HEENT: NC, Atraumatic. Anicteric sclerae. Lungs: Coarse sounds bilaterally throughout with rales in bases   Heart:  RRR   Abdomen: Soft, Non distended, Non tender.   +Bowel sounds, no HSM  Extremities: No c/c/e  Psych:   Agitated, confused   Neurologic:  Responds to pain     Intake and Output:  Current Shift:  01/09 0701 - 01/09 1900  In: -   Out: 200 [Urine:200]  Last three shifts:  01/07 1901 - 01/09 0700  In: -   Out: 6923 [Urine:1825]    Labs: Results:       Chemistry Recent Labs     01/09/20  0302 01/08/20  0243 01/07/20  0211   * 146* 104*   * 146* 147*   K 3.8 3.4* 3.8   * 113* 112*   CO2 26 26 26   BUN 11 14 18   CREA 1.39* 1.48* 1.39*   CA 8.1* 8.3* 8.7   AGAP 5 7 9   BUCR 8* 9* 13   AP  -- --  89   TP  --   --  8.0   ALB  --   --  3.4   GLOB  --   --  4.6*   AGRAT  --   --  0.7*      CBC w/Diff Recent Labs     01/09/20  0302 01/08/20  0243 01/07/20 0211   WBC 6.1 5.6 5.9   RBC 4.44* 4.21* 4.73   HGB 13.4 12.8* 14.6   HCT 40.7 38.7 44.3    133* 128*   GRANS 67 67  --    LYMPH 17* 24  --    EOS 1 0  --       Cardiac Enzymes Recent Labs     01/07/20 0211   *   CKND1 0.4      Coagulation No results for input(s): PTP, INR, APTT, INREXT, INREXT in the last 72 hours. Lipid Panel Lab Results   Component Value Date/Time    Cholesterol, total 108 09/21/2019 10:00 AM    HDL Cholesterol 39 (L) 09/21/2019 10:00 AM    LDL, calculated 40.8 09/21/2019 10:00 AM    VLDL, calculated 28.2 09/21/2019 10:00 AM    Triglyceride 141 09/21/2019 10:00 AM    CHOL/HDL Ratio 2.8 09/21/2019 10:00 AM      BNP No results for input(s): BNPP in the last 72 hours. Liver Enzymes Recent Labs     01/07/20 0211   TP 8.0   ALB 3.4   AP 89   SGOT 42*      Thyroid Studies Lab Results   Component Value Date/Time    TSH 0.997 03/23/2011 02:12 PM                Assessment and Plan:     Sofia Lake Sr. is a 78 y.o. male with a PMHx of end-stage dementia, Type II DM, HTN, and arthritis who is admitted for multiple problems including hypoxia, hypernatremia, dementia and agitation. 1. Acute hypoxic respiratory failure- likely 2/2 difficulty clearing secretions and #2    2. Aspiration PNA   3. Hypernatremia   4. Dementia w/agitation   5. HTN   6. Type II DM- HbA1c 6.3 in 9/19   7. Dysphagia      Comfort measures only   Hospice consulted   Will continue depakote IV       Case discussed with:  []Patient  [x]Family  [x]Nursing  [x]Case Management  Diet: NPO   Code Status: DNR   Disposition: Continue current care; >2 midnights         H.  Sonido Robles DO  1/9/2020

## 2020-01-09 NOTE — HOSPICE
Met with wife and  at bedside. Discussed [facility] philosophy, services, criteria, and IDT. Answered all questions. Gave brochure with 24/7 contact information. Patient on HFNC. Hospice will continue to follow. Thank you for the referral to [facility]. If we can be of further assistance please contact [phone].         VIRIDIANA PriestN, RN  Clinical Coordinator   [place] [address] [address] [facility], [address] [place] [address] Str.  [phone]  [email or web address]

## 2020-01-09 NOTE — ACP (ADVANCE CARE PLANNING)
[facility]: [phone] ([phone])  [facility]: [phone]    Time In: 1100  Time Out: 5984    Patient status is DNR/DNI. He is on COMFORT MEASURES. Advanced Steps [facility] (Physician Orders for Scope of Treatment)       Date of conversation: 01/09/2020   [facility]  Length (minutes): 70    Participants:   [] Patient (unable to participate - minimally responsive)   [x] Other Surrogate Decision Maker / Next of Kin    Name: Antonia Maxwell      Relationship to Patient: Radha Otoole (Patient does not have a     Phone number: [phone]     Other persons present: Brother in law, sister-in-law,     Advanced Steps® ACP Facilitator: JUANI CastanedaW; Jordon Mcdonald NP      Conversation Topics   Understanding of Medical Condition/s AND Potential Complications:    Healthcare Agent/Other Surrogate: Wife stated that she was told patient was at end of life and stated that she just wants to keep him comfortable    Identifies the following as important for living well: Being near his wife. She shared that when patient was in respite care at [facility], he was very anxious because of how attached they are. They have been  x 54 years. Hopes: That patient will be kept comfortable    Worries/Fears about Medical Condition: That patient will continue to deteriorate. She voiced disbelief at patient's decline. Sources of support/comfort described as:  Two children (one in [place], one in [place]), , Christianity community, her local brother    Cultural, Mandaeism, spiritual, or personal beliefs described as: [address], belief in afterlife    Needs to discuss with spiritual/Mandaeism advisor: [x] Yes  [] No - Syble Blanks present during meeting and has been providing ongoing support to wife    Needs more information about illness and complications:  [] Yes  [x] No      Cardiopulmonary Resuscitation      \"What do you understand about CPR? \" Response: \"That and life support just wouldn't help [patient] at this point. Pounding on him and all? No. Let's let him be peaceful. \"    Order Elected for CPR:  []  Attempt Resuscitation [x]  Do Not Attempt Resuscitation      When NOT in Cardiopulmonary Arrest, Order Elected:      [x] Comfort Measures  [] Limited Additional Interventions  [] Full Interventions    Artificially Administered Nutrition, Order Elected:    [x] No Feeding Tube   [] Feeding Tube for a defined trial period  [] Feeding Tube long-term if indicated    The following was provided (check all that apply):      [x] Review of [place] POST Form       Meeting Outcomes:   [x] ACP discussion completed   [x] Jason form completed  [x] Jason prepared for Provider review and signature   [x] Original placed on Chart, if in facility (form to be sent with patient at discharge)  [x] Copy given to healthcare agent    [x] Copy scanned to electronic medical record     Follow-up plan:     [] Schedule follow-up conversation to continue planning   [] Referred individual to Provider for additional questions/concerns   [x] Advised patient/agent/surrogate to review completed POST form and update if needed with changes in condition, patient  preferences or care setting - Provided PM business card for questions. [x] This note routed to one or more involved healthcare providers - via EMR    Provided wife with original and one copy; copy placed on chart for scan into EMR.

## 2020-01-09 NOTE — PROGRESS NOTES
SLP Note:    Pt not appropriate for po trials at this time due to respiratory status. Recommend comfort diet as tolerated if transitioned hospice care. Discussed with pt's wife at bedside who verbalized understanding. Will sign off. Please re-consult should skilled needs arise.       Brant Whitmore M.S., [address] [facility]  Speech-Language Pathologist

## 2020-01-09 NOTE — PROGRESS NOTES
[facility]: [phone]  [facility]: [phone]   [facility]: [phone]    Patient Name: Mendoza León. YOB: 1940    Date of Initial Consult: 1/8/2020; Follow up 1/9/2020  Reason for Consult: Establish Goals of Care  Requesting Provider:  Manasa Marquez MD  Primary Care Physician: Linnette Easton MD      SUMMARY:   Mendoza Diop is a 78 y.o. male with a past history of long term dementia, HTN, DM Type2, and arthritis  who was admitted on 1/5/2020 from home with a diagnosis of end stage dementia and respiratory failure with hypoxia, hypernatremia. Current medical issues leading to Palliative Medicine involvement include: establish goals of care and AMD.    CHIEF COMPLAINT: dyspnea    HPI/SUBJECTIVE:    78year old very robust AAM that has been living at home with his wife of 64 years. Wife reports that patient has been living with dementia for the past 25 years but noted a severe decline in his overall condition over the past several months. Began aspirating liquids and developed respiratory distress. 1/9/2020: Palliative care team including Nereida Hansen LCSW and myself met with patient and his wife at bedside. Also present was the couple's . Patient is lying in bed with non-rebreather over hiflow O2, he is not following commands. He intermittently responds to voice but is not responding to questions. The patient is:   [] Verbal and participatory  [x] Non-participatory due to:  Medical condition     GOALS OF CARE:  Patient/Health Care Proxy Stated Goals: Comfort      TREATMENT PREFERENCES:   Code Status: DNR/DNI with comfort measures only. PALLIATIVE DIAGNOSES:   1. Goals of Care/ACP  2.   End stage dementia  3. Respiratory failure  4. Dysphagia       PLAN:   1/9/2019: Family received update from physician regarding patient's medical condition.  He is not responding to the antibiotic treatment. Decision has been made to change goals of care to comfort measures only. POST form completed: DNR/DNI, comfort measures and no feeding tubes and was signed by the MPOA, his wife, Kimberley Milan. Comfort orders placed in computer. MD updated. Family plans to meet with hospice liaison today. GOALS OF CARE: DNR/DNI, comfort measures with discharge to home with hospice services. See previous notes shown below:     1/8/2019  1. Goals of Care/ACP  Palliative team including Yolis Gayle met with patient and his wife and brother in law. Discussion of prognosis and goals of care with benefits and burdens of use of a feeding tube. At this time wife states that she is not in favor of putting in a NGT or PEG tube. States that his quality of life has recently been pretty low and he has indicated to her that he would rather \"be dead than live like this\" Wife reports that he has always been consistent with his wishes for DNR and DNI. Orders updated in the chart. Also briefly reviewed his options for hospice care at home which was met with appreciation. Placed a hospice consult. Notified Dr Marcia Wasserman and Case Management. Palliative to follow tomorrow for POST completion at family request.  2.   End Stage Dementia  Pt now a FAST Score of 7F. Only  A word here and there. Unable to hold up head or smile. 3.   Respiratory failure  Patient with large amount of scattered rhonchi. CXR shows large amount of infiltrates sami on right side. Patient has suctioning available and on a scopolamine patch. 4.   Dysphagia  Pt unable to follow commands for MBS. Gurgled breath sounds and unable to manage secretions. Family choosing comfort feeding opposed to NGT placement or PEG tube  5. Initial consult note routed to primary continuity provider  6.    Communicated plan of care with: Palliative IDT      Advance Care Planning:  [] [facility] Interdisciplinary Team has updated the ACP Navigator with [address] and Patient Capacity    Primary Decision [facility] ([address]): Wife     Medical Interventions: Comfort measures     Artificially Administered Nutrition: No feeding tube     As far as possible, the palliative care team has discussed with patient / health care proxy about goals of care / treatment preferences for patient. HISTORY:     History obtained from: chart    Principal Problem:    Aspiration pneumonia ([address] (1/9/2020)    Active Problems:    Acute respiratory failure with hypoxia ([address] (1/6/2020)      Confusion (1/6/2020)      Hypoxia (1/6/2020)      Hypernatremia (1/7/2020)      Dysphagia (1/8/2020)      Goals of care, counseling/discussion ()      Past Medical History:   Diagnosis Date    Anxiety     Arthritis     Cancer ([address] 2009    bladder     Carotid duplex 05/26/2011    No occlusive disease >49% bilaterally.  Chronic back pain 5/6/2010    Chronic traumatic encephalopathy     Dementia ([address]     Depression     he has taken Xanax for 16 years for this    Diabetes ([address]     Dizziness     ED (erectile dysfunction)     Fibrosis of knee joint     Peripatellar, right    Headache(784.0)     HTN (hypertension), benign 4/1/2010    Late onset Alzheimer's disease with behavioral disturbance ([address]     Left wrist pain     Lumbar spinal stenosis 9/25/2010    Neuropathy of lower extremity     Obesity     Osteoarthritis of both knees     Sleep apnea     cpap use    SOB (shortness of breath)     Vertigo, benign positional       Past Surgical History:   Procedure Laterality Date    BIOPSY  12/19/2007    HX OTHER SURGICAL  11/2014    right knee    HX UROLOGICAL      Bladder CA      Family History   Problem Relation Age of Onset    Hypertension Mother     Heart Disease Mother     Other Mother         Alzheimer's disease    Diabetes Neg Hx      History reviewed, no pertinent family history.   Social History     Tobacco Use    Smoking status: Former Smoker    Smokeless tobacco: Never Used    Tobacco comment: quit smoking in 2000   Substance Use Topics    Alcohol use: No     Alcohol/week: 0.0 standard drinks     Allergies   Allergen Reactions    Chlorhexidine Towelette Itching      Current Facility-Administered Medications   Medication Dose Route Frequency    LORazepam (INTENSOL) 2 mg/mL oral concentrate 0.5 mg  0.5 mg SubLINGual Q1H PRN    ketorolac (TORADOL) injection 15 mg  15 mg IntraVENous Q8H    scopolamine (TRANSDERM-SCOP) 1 mg over 3 days 1 Patch  1 Patch TransDERmal Q72H PRN    morphine (ROXANOL) 100 mg/5 mL (20 mg/mL) concentrated solution 5 mg  5 mg SubLINGual Q30MIN PRN    ketorolac (TORADOL) injection 30 mg  30 mg IntraVENous ONCE    acetaminophen (TYLENOL) suppository 650 mg  650 mg Rectal Q6H PRN    valproate (DEPACON) 125 mg in 0.9% sodium chloride 50 mL IVPB  125 mg IntraVENous Q6H    [Held by provider] amLODIPine (NORVASC) tablet 10 mg  10 mg Oral DAILY    [Held by provider] irbesartan (AVAPRO) tablet 150 mg  150 mg Oral DAILY    [Held by provider] pantoprazole (PROTONIX) tablet 40 mg  40 mg Oral ACB    [Held by provider] divalproex DR (DEPAKOTE) tablet 250 mg  250 mg Oral BID    [Held by provider] metoprolol succinate (TOPROL-XL) XL tablet 50 mg  50 mg Oral DAILY    [Held by provider] QUEtiapine (SEROquel) tablet 50 mg  50 mg Oral QHS    haloperidol lactate (HALDOL) injection 5 mg  5 mg IntraVENous Q8H PRN          Clinical Pain Assessment (nonverbal scale for nonverbal patients): Clinical Pain Assessment  Severity: 2     Activity (Movement): Lying quietly, normal position    Duration: for how long has pt been experiencing pain (e.g., 2 days, 1 month, years)  Frequency: how often pain is an issue (e.g., several times per day, once every few days, constant)     FUNCTIONAL ASSESSMENT:     Palliative Performance Scale (PPS):  PPS: 10    ECOG  ECOG Status : Completely disabled     PSYCHOSOCIAL/SPIRITUAL SCREENING:      Any spiritual / Latter day concerns:  [] Yes / [x] No    Caregiver Burnout:  [] Yes /  [x] No /  [] No Caregiver Present      Anticipatory grief assessment:   [x] Normal  / [] Maladaptive        REVIEW OF SYSTEMS:     Positive and pertinent negative findings in ROS are noted above in HPI. The following systems were [x] reviewed / [] unable to be reviewed as noted in HPI  Other findings are noted below. Constitutional: lethargic, AAM that in mild respiratory distress, very warm to touch with facial flushing. Eyes: pupils not observed  ENMT: no nasal discharge, dry mucous membranes  Cardiovascular:  distal pulses intact; generalized edema. Respiratory: terminal breath sounds  Gastrointestinal: soft non-tender   Musculoskeletal: no deformity, no tenderness to palpation  Skin: hot, dry  Neurologic: Not oriented or able to follow commands       Systems: constitutional, ears/nose/mouth/throat, respiratory, gastrointestinal, genitourinary, musculoskeletal, integumentary, neurologic, psychiatric, endocrine. Positive findings noted below. Modified ESAS Completed by: provider   Fatigue: 5 Drowsiness: 5     Pain: 2           Dyspnea: 6           Stool Occurrence(s): 1        PHYSICAL EXAM:     Wt Readings from Last 3 Encounters:   01/07/20 113.4 kg (250 lb)   12/31/19 116.1 kg (256 lb)   11/04/19 117.5 kg (259 lb)     Blood pressure 129/81, pulse 100, temperature (!) 101.6 °F (38.7 °C), resp. rate 19, height 5' 9\" (1.753 m), weight 113.4 kg (250 lb), SpO2 100 %.   Pain:  Pain Scale 1: Adult Nonverbal Pain Scale  Pain Intensity 1: 0                      LAB AND IMAGING FINDINGS:     Lab Results   Component Value Date/Time    WBC 6.1 01/09/2020 03:02 AM    HGB 13.4 01/09/2020 03:02 AM    PLATELET 843 22/11/3065 03:02 AM     Lab Results   Component Value Date/Time    Sodium 148 (H) 01/09/2020 03:02 AM    Potassium 3.8 01/09/2020 03:02 AM    Chloride 117 (H) 01/09/2020 03:02 AM    CO2 26 01/09/2020 03:02 AM    BUN 11 01/09/2020 03:02 AM    Creatinine 1.39 (H) 01/09/2020 03:02 AM    Calcium 8.1 (L) 01/09/2020 03:02 AM    Magnesium 2.0 01/05/2020 08:05 PM    Phosphorus 3.5 08/17/2015 02:50 AM      Lab Results   Component Value Date/Time    AST (SGOT) 42 (H) 01/07/2020 02:11 AM    Alk. phosphatase 89 01/07/2020 02:11 AM    Protein, total 8.0 01/07/2020 02:11 AM    Albumin 3.4 01/07/2020 02:11 AM    Globulin 4.6 (H) 01/07/2020 02:11 AM     Lab Results   Component Value Date/Time    INR 1.2 01/05/2020 08:05 PM    Prothrombin time 15.2 01/05/2020 08:05 PM    aPTT 25.3 01/05/2020 08:05 PM      No results found for: IRON, FE, TIBC, IBCT, PSAT, FERR   No results found for: PH, PCO2, PO2  No components found for: [place]   Lab Results   Component Value Date/Time     (H) 01/07/2020 02:11 AM    CK - MB 2.5 01/07/2020 02:11 AM              Total time: 35 minutes > 50% counseling / coordination:   Counseling / coordination time, spent as noted above. Time was spent in direct consultation with patient's family and medical team.       Prolonged service was provided for  []30 min   []75 min in face to face time in the presence of the patient, spent as noted above. Time Start:   Time End:   Note: this can only be billed with 86730 (initial) or 00603 (follow up). If multiple start / stop times, list each separately.

## 2020-01-09 NOTE — HOSPICE
Family meeting at 12 pm today. Thank you for the referral to [facility]. Please contact [phone] with any questions or concerns.             VIRIDIANA CaleroN, RN  Clinical Coordinator  [place] [address] [place] [address], [address] [place], [place] [address] [address]  [phone]

## 2020-01-09 NOTE — ROUTINE PROCESS
Bedside shift change report given to Payal RN (oncoming nurse) by Nancy Sun RN (offgoing nurse). Report included the following information SBAR, Kardex, Intake/Output, MAR and Recent Results.

## 2020-01-10 NOTE — ROUTINE PROCESS
Bedside shift change report given to [employer] (oncoming nurse) by Ulisses Thakkar (offgoing nurse). Report included the following information SBAR, Kardex, Intake/Output, MAR and Recent Results.

## 2020-01-10 NOTE — PROGRESS NOTES
[place] elevated temperature 102.6 tylenol 650 mg suppository given discontinued IVF Dr Brenda Lugo made aware of new changes in patient condition lethargic, pt using accessories muscles to breath, pinkish colored nasal and mouth drainage suctioned oral and nasal airway, tachypnea, non re breather mask applied maintained HOB at 45, will continue to monitor resp rate, rhythm, and depth

## 2020-01-10 NOTE — PROGRESS NOTES
followed up with Mr Vinay Contreras who is receiving comfort measures at this time and he appears to be comfortable. His spouse Kevyn Bowers is present at bedside and affirms this observation. Payton's friend Soni Barreto is also present. Compassionate support offered to Kevyn Bowers who shared her gratitude for our team's visits and support. She added her gratitude for friends and others who are supporting her during this time. Encouraged Kevyn Bowers to assure Mr Vinay Contreras to go when he's ready and that she will be ok. She whispers, \"He was seeing my mother and his brother last week and I told him he was free to go. \"     Plan to go home with hospice although Kevyn Bowers shared her hesitation having to take him home. She adds that she does have extra support. Words of encouragement and comfort offered.     [address] [place] [address] E  [phone]

## 2020-01-10 NOTE — PROGRESS NOTES
Pt is now comfort measures only. Kenney Savage of [facility] is following and will inform Case Management when pt is ready for discharge.         Kip Bo, VIRIDIANAN RN  Care Management  Pager: [phone]

## 2020-01-10 NOTE — PROGRESS NOTES
Lasix IV given with good results noted 1000 ml que with red flex urine will continue to monitor urinary output    1145 Comfort measures oders     1308 inserted 16 Fr indwelling armando cath with aseptic technique pt tolerated procedure fairly well family visiting at bedside for emotional support    1500 pt breathing better symmetrical breathing maintained HOB @ 45

## 2020-01-10 NOTE — PROGRESS NOTES
[facility]: [phone]  [facility]: [phone]   [facility]: [phone]    Patient Name: Rj Matta. YOB: 1940    Date of Follow up: 1/10/20  Reason for Consult: Establish Goals of Care  Requesting Provider:  Rebekah Rowland MD  Primary Care Physician: Carmelina Antunez MD      SUMMARY:   Rj Locke is a 78 y.o. male with a past history of long term dementia, HTN, DM Type2, and arthritis  who was admitted on 1/5/2020 from home with a diagnosis of end stage dementia and respiratory failure with hypoxia, hypernatremia. Current medical issues leading to Palliative Medicine involvement include: establish goals of care and AMD.    CHIEF COMPLAINT: dyspnea    HPI/SUBJECTIVE:    78year old very robust AAM that has been living at home with his wife of 64 years. Wife reports that patient has been living with dementia for the past 25 years but noted a severe decline in his overall condition over the past several months. Began aspirating liquids and developed respiratory distress. 1/10/2020  Pt with reports of increased dyspnea over the night shift. Pt placed on high flow overnight at 50L and placed on restraints. Pt presently does not appear in any distress. 1/9/2020: Palliative care team including Viry Hunt, GEETA and myself met with patient and his wife at bedside. Also present was the couple's . Patient is lying in bed with non-rebreather over hiflow O2, he is not following commands. He intermittently responds to voice but is not responding to questions. The patient is:   [] Verbal and participatory  [x] Non-participatory due to:  Medical condition     GOALS OF CARE:  Patient/Health Care Proxy Stated Goals: Comfort      TREATMENT PREFERENCES:   Code Status: DNR/DNI with comfort measures only. PALLIATIVE DIAGNOSES:   1. Goals of Care/ACP  2.   End stage dementia  3.    Respiratory failure  4. Dysphagia       PLAN:   1/10/2020- Goals of Care/ACP  Palliative team including Berta Leon RN and this NP met with and discussed comfort care objectives with staff. Discontinued restraints and high flow and scheduled Morphine SL Q 4 hours. Goals for patient are Comfort Care and home with hospice. Preparing patient for home based treatments and medications. 12:30 addendum:   Team revisited patient to determine comfort status. Wife was at the bedside and reports that she is feeling scared about taking patient home. Provided supportive listening and assurance that she would have hospice support. Patient appears very comfortable and is not fighting. Removed his restraints and noted his last dose of Morphine at 10:00am PRN and then Scheduled at 12:00pm.  Is now scheduled for Q 4 hours to alleviate air hunger   Wife continues to need support and reassurance and she reports that a \"friend of the family\" was at home now helping prepare for patient's discharge to home. Encouraged to play patient's favorite music and talk with him. At this time patient remains a DNR/DNI on comfort care    See below for prior conversations:    1/9/2019: Family received update from physician regarding patient's medical condition. He is not responding to the antibiotic treatment. Decision has been made to change goals of care to comfort measures only. POST form completed: DNR/DNI, comfort measures and no feeding tubes and was signed by the MPOA, his wife, Ronnie Waller. Comfort orders placed in computer. MD updated. Family plans to meet with hospice liaison today. GOALS OF CARE: DNR/DNI, comfort measures with discharge to home with hospice services. See previous notes shown below:     1/8/2019  1. Goals of Care/ACP  Palliative team including Elo Edward met with patient and his wife and brother in law. Discussion of prognosis and goals of care with benefits and burdens of use of a feeding tube.  At this time wife states that she is not in favor of putting in a NGT or PEG tube. States that his quality of life has recently been pretty low and he has indicated to her that he would rather \"be dead than live like this\" Wife reports that he has always been consistent with his wishes for DNR and DNI. Orders updated in the chart. Also briefly reviewed his options for hospice care at home which was met with appreciation. Placed a hospice consult. Notified Dr Laura Berry and Case Management. Palliative to follow tomorrow for POST completion at family request.  2.   End Stage Dementia  Pt now a FAST Score of 7F. Only  A word here and there. Unable to hold up head or smile. 3.   Respiratory failure  Patient with large amount of scattered rhonchi. CXR shows large amount of infiltrates sami on right side. Patient has suctioning available and on a scopolamine patch. 4.   Dysphagia  Pt unable to follow commands for MBS. Gurgled breath sounds and unable to manage secretions. Family choosing comfort feeding opposed to NGT placement or PEG tube  5. Initial consult note routed to primary continuity provider  6. Communicated plan of care with: Palliative IDT      Advance Care Planning:  [] The [facility] Interdisciplinary Team has updated the ACP Navigator with [address] and Patient Capacity    Primary Decision [address] ([address]): Wife     Medical Interventions: Comfort measures     Artificially Administered Nutrition: No feeding tube     As far as possible, the palliative care team has discussed with patient / health care proxy about goals of care / treatment preferences for patient.          HISTORY:     History obtained from: chart    Principal Problem:    Aspiration pneumonia ([address] (1/9/2020)    Active Problems:    Acute respiratory failure with hypoxia ([address] (1/6/2020)      Confusion (1/6/2020)      Hypoxia (1/6/2020)      Hypernatremia (1/7/2020)      Dysphagia (1/8/2020)      Goals of care, counseling/discussion ()      Past Medical History:   Diagnosis Date    Anxiety     Arthritis     Cancer [facility]) 2009    bladder     Carotid duplex 05/26/2011    No occlusive disease >49% bilaterally.  Chronic back pain 5/6/2010    Chronic traumatic encephalopathy     Dementia ([address]     Depression     he has taken Xanax for 16 years for this    Diabetes ([address]     Dizziness     ED (erectile dysfunction)     Fibrosis of knee joint     Peripatellar, right    Headache(784.0)     HTN (hypertension), benign 4/1/2010    Late onset Alzheimer's disease with behavioral disturbance ([address]     Left wrist pain     Lumbar spinal stenosis 9/25/2010    Neuropathy of lower extremity     Obesity     Osteoarthritis of both knees     Sleep apnea     cpap use    SOB (shortness of breath)     Vertigo, benign positional       Past Surgical History:   Procedure Laterality Date    BIOPSY  12/19/2007    HX OTHER SURGICAL  11/2014    right knee    HX UROLOGICAL      Bladder CA      Family History   Problem Relation Age of Onset    Hypertension Mother     Heart Disease Mother     Other Mother         Alzheimer's disease    Diabetes Neg Hx      History reviewed, no pertinent family history.   Social History     Tobacco Use    Smoking status: Former Smoker    Smokeless tobacco: Never Used    Tobacco comment: quit smoking in 2000   Substance Use Topics    Alcohol use: No     Alcohol/week: 0.0 standard drinks     Allergies   Allergen Reactions    Chlorhexidine Towelette Itching      Current Facility-Administered Medications   Medication Dose Route Frequency    morphine (ROXANOL) concentrated oral syringe 5 mg  5 mg Oral Q4H    LORazepam (INTENSOL) 2 mg/mL oral concentrate 0.5 mg  0.5 mg SubLINGual Q1H PRN    ketorolac (TORADOL) injection 15 mg  15 mg IntraVENous Q8H    scopolamine (TRANSDERM-SCOP) 1 mg over 3 days 1 Patch  1 Patch TransDERmal Q72H PRN    morphine (ROXANOL) concentrated oral syringe 5 mg  5 mg SubLINGual Q30MIN PRN    acetaminophen (TYLENOL) suppository 650 mg  650 mg Rectal Q6H PRN    valproate (DEPACON) 125 mg in 0.9% sodium chloride 50 mL IVPB  125 mg IntraVENous Q6H    [Held by provider] amLODIPine (NORVASC) tablet 10 mg  10 mg Oral DAILY    [Held by provider] irbesartan (AVAPRO) tablet 150 mg  150 mg Oral DAILY    [Held by provider] pantoprazole (PROTONIX) tablet 40 mg  40 mg Oral ACB    [Held by provider] divalproex DR (DEPAKOTE) tablet 250 mg  250 mg Oral BID    [Held by provider] metoprolol succinate (TOPROL-XL) XL tablet 50 mg  50 mg Oral DAILY    [Held by provider] QUEtiapine (SEROquel) tablet 50 mg  50 mg Oral QHS    haloperidol lactate (HALDOL) injection 5 mg  5 mg IntraVENous Q8H PRN          Clinical Pain Assessment (nonverbal scale for nonverbal patients): Clinical Pain Assessment  Severity: 0     Activity (Movement): Lying quietly, normal position    Duration: for how long has pt been experiencing pain (e.g., 2 days, 1 month, years)  Frequency: how often pain is an issue (e.g., several times per day, once every few days, constant)     FUNCTIONAL ASSESSMENT:     Palliative Performance Scale (PPS):  PPS: 10    ECOG  ECOG Status : Completely disabled     PSYCHOSOCIAL/SPIRITUAL SCREENING:      Any spiritual / Lutheran concerns:  [] Yes /  [x] No    Caregiver Burnout:  [] Yes /  [x] No /  [] No Caregiver Present      Anticipatory grief assessment:   [x] Normal  / [] Maladaptive        REVIEW OF SYSTEMS:     Positive and pertinent negative findings in ROS are noted above in HPI. The following systems were [x] reviewed / [] unable to be reviewed as noted in HPI  Other findings are noted below. Constitutional: lethargic, AAM that in mild respiratory distress, very warm to touch with facial flushing. Eyes: pupils not observed, eyes closed   ENMT: no nasal discharge, dry mucous membranes  Cardiovascular:  distal pulses intact; generalized edema.   Respiratory: terminal breath sounds  Gastrointestinal: soft non-tender   Musculoskeletal: no deformity, no tenderness to palpation  Skin: hot, dry  Neurologic: Not oriented and not able to follow commands       Systems: constitutional, ears/nose/mouth/throat, respiratory, gastrointestinal, genitourinary, musculoskeletal, integumentary, neurologic, psychiatric, endocrine. Positive findings noted below. Modified ESAS Completed by: provider   Fatigue: 5 Drowsiness: 5     Pain: 0   Anxiety: 0     Anorexia: 0 Dyspnea: 3     Constipation: No     Stool Occurrence(s): 1        PHYSICAL EXAM:     Wt Readings from Last 3 Encounters:   01/07/20 113.4 kg (250 lb)   12/31/19 116.1 kg (256 lb)   11/04/19 117.5 kg (259 lb)     Blood pressure 196/82, pulse 87, temperature 99.8 °F (37.7 °C), resp. rate 20, height 5' 9\" (1.753 m), weight 113.4 kg (250 lb), SpO2 90 %. Pain:  Pain Scale 1: Adult Nonverbal Pain Scale  Pain Intensity 1: 0                      LAB AND IMAGING FINDINGS:     Lab Results   Component Value Date/Time    WBC 6.1 01/09/2020 03:02 AM    HGB 13.4 01/09/2020 03:02 AM    PLATELET 871 52/58/3024 03:02 AM     Lab Results   Component Value Date/Time    Sodium 148 (H) 01/09/2020 03:02 AM    Potassium 3.8 01/09/2020 03:02 AM    Chloride 117 (H) 01/09/2020 03:02 AM    CO2 26 01/09/2020 03:02 AM    BUN 11 01/09/2020 03:02 AM    Creatinine 1.39 (H) 01/09/2020 03:02 AM    Calcium 8.1 (L) 01/09/2020 03:02 AM    Magnesium 2.0 01/05/2020 08:05 PM    Phosphorus 3.5 08/17/2015 02:50 AM      Lab Results   Component Value Date/Time    AST (SGOT) 42 (H) 01/07/2020 02:11 AM    Alk.  phosphatase 89 01/07/2020 02:11 AM    Protein, total 8.0 01/07/2020 02:11 AM    Albumin 3.4 01/07/2020 02:11 AM    Globulin 4.6 (H) 01/07/2020 02:11 AM     Lab Results   Component Value Date/Time    INR 1.2 01/05/2020 08:05 PM    Prothrombin time 15.2 01/05/2020 08:05 PM    aPTT 25.3 01/05/2020 08:05 PM      No results found for: IRON, FE, TIBC, IBCT, PSAT, FERR   No results found for: PH, PCO2, PO2  No components found for: [place]   Lab Results   Component Value Date/Time     (H) 01/07/2020 02:11 AM    CK - MB 2.5 01/07/2020 02:11 AM              Total time: 25 minutes > 50% counseling / coordination:   Counseling / coordination time, spent as noted above. Time was spent in direct consultation with patient's family and medical team.       Prolonged service was provided for  []30 min   []75 min in face to face time in the presence of the patient, spent as noted above. Time Start:   Time End:   Note: this can only be billed with 03074 (initial) or 83308 (follow up). If multiple start / stop times, list each separately.

## 2020-01-10 NOTE — PROGRESS NOTES
[facility]: [phone] ([phone])  [facility]: [phone]      CODE STATUS- DNR/DNI- COMFORT MEASURES   POST FORM ON FILE    Palliative care visit with patient at bedside to assess comfort level. Plan is for patient to be discharged under hospice care services. Mr. Asha Rankin is under comfort care while in the hospital in order to allow for comfort focus. Pt lying in bed with HI flow O2 at 50%, with wrist restraints. Opens eyes to verbal stimuli and pt. was able to confirm he was not is pain but was having difficulty breathing. Consulted with bedside RN Adrian Prabhakar. Morphine (Roxanol) orders updated from prn to scheduled doing. Resp therapy contacted to dc the HI Flow O2 and place on O2 via NC at 10 liters. Plan will be to treat resp symptom with medications. Palliative team will continue to follow and assess for comfort while inpatient. UPDATE- 1200 noon Follow up visit with Palliative Team members Dana Shah, CINDY and this writer with patient at bedside. Mrs. Ang present at bedside. Pt is showing no out forward signs of pain. No difficulty breathing noted at this time, pt. on 10 liters O2. via NC. PM team member assisted spouse in setting up music for patient to listen to. Pt smiled and attempted to mouth/sing one of the songs with sofe whisper voice . Supportive and emotional listening provided. Family has contact info should they have need for additional intervention.    Current code status = DNR/DNI- COMFORT MEASURES            Erik Parry RN, [facility]  Palliative Medicine Inpatient [address] [address]: [phone] ([phone])

## 2020-01-10 NOTE — ROUTINE PROCESS
Bedside and Verbal shift change report given to Elzbieta Jauregui (oncoming nurse) by Esperanza Prado RN (offgoing nurse). Report included the following information SBAR, Kardex and Recent Results.

## 2020-01-10 NOTE — PROGRESS NOTES
Progress Note         Patient: Yaa Dave MRN: [id number]  CSN: [id number]    YOB: 1940  Age: 78 y.o. Sex: male    DOA: 1/5/2020 LOS:  LOS: 4 days                    Subjective:     Juan Ang Sr. is a 78 y.o. male with a PMHx of end-stage dementia, Type II DM, HTN, and arthritis who is admitted for multiple problems including hypoxia, hypernatremia, dementia and agitation . Responds to pain and loud voice   Unable to answer any questions   Failed bedside swallow    No change in mental status overnight   Now with fevers despite abx, worsening respiratory status, rhonchorus lungs and CXR showing increased R sided infiltrate     Now comfort care    Comfortable in bed today   Respiratory status much improved      Objective:     Physical Exam:  Visit Vitals  /82 (BP 1 Location: Right arm, BP Patient Position: At rest)   Pulse 87   Temp 99.8 °F (37.7 °C)   Resp 20   Ht 5' 9\" (1.753 m)   Wt 113.4 kg (250 lb)   SpO2 90%   BMI 36.92 kg/m²        General:        NAD   HEENT: NC, Atraumatic. Anicteric sclerae. Lungs: Coarse sounds bilaterally throughout  Heart:  RRR   Abdomen: Soft, Non distended, Non tender. +Bowel sounds, no HSM  Extremities: No c/c/e  Psych:   Agitated, confused   Neurologic:  Responds to pain     Intake and Output:  Current Shift:  No intake/output data recorded. Last three shifts:  01/08 1901 - 01/10 0700  In: -   [facility] 1550 [facility]    Labs: Results:       Chemistry Recent Labs     01/09/20 0302 01/08/20  0243   * 146*   * 146*   K 3.8 3.4*   * 113*   CO2 26 26   BUN 11 14   CREA 1.39* 1.48*   CA 8.1* 8.3*   AGAP 5 7   BUCR 8* 9*      CBC w/Diff Recent Labs     01/09/20  0302 01/08/20  0243   WBC 6.1 5.6   RBC 4.44* 4.21*   HGB 13.4 12.8*   HCT 40.7 38.7    133*   GRANS 67 67   LYMPH 17* 24   EOS 1 0      Cardiac Enzymes No results for input(s): CPK, CKND1, CESIA in the last 72 hours.     No lab exists for component: CKRMB, TROIP   Coagulation No results for input(s): PTP, INR, APTT, INREXT, INREXT in the last 72 hours. Lipid Panel Lab Results   Component Value Date/Time    Cholesterol, total 108 2019 10:00 AM    HDL Cholesterol 39 (L) 2019 10:00 AM    LDL, calculated 40.8 2019 10:00 AM    VLDL, calculated 28.2 2019 10:00 AM    Triglyceride 141 2019 10:00 AM    CHOL/HDL Ratio 2.8 2019 10:00 AM      BNP No results for input(s): BNPP in the last 72 hours. Liver Enzymes No results for input(s): TP, ALB, TBIL, AP, SGOT, GPT in the last 72 hours. No lab exists for component: DBIL   Thyroid Studies Lab Results   Component Value Date/Time    TSH 0.997 2011 02:12 PM                Assessment and Plan:     Terri Apgar Sr. is a 78 y.o. male with a PMHx of end-stage dementia, Type II DM, HTN, and arthritis who is admitted for multiple problems including hypoxia, hypernatremia, dementia and agitation. 1. Acute hypoxic respiratory failure- likely 2/2 difficulty clearing secretions and #2    2. Aspiration PNA   3. Hypernatremia   4. Dementia w/agitation   5. HTN   6. Type II DM  7. Dysphagia      Comfort measures only   Hospice consulted   Will continue depakote IV       Case discussed with:  []Patient  [x]Family  [x]Nursing  [x]Case Management  Diet: Comfort feeding   Code Status: DNR   Disposition: Continue current care        H. Milton Leyden, DO  1/10/2020

## 2020-01-11 NOTE — ROUTINE PROCESS
Pt on comfort care. Pt's oral suctioned for moderated amounted of thick white secretions. Tolerated well. Mouth care given. 1600- pt suctioned for moderated amount of thick white secretions. Tolerated well.

## 2020-01-11 NOTE — PROGRESS NOTES
Problem: Falls - Risk of  Goal: *Absence of Falls  Description  Document Catherene Bunting Fall Risk and appropriate interventions in the flowsheet. Outcome: Progressing Towards Goal  Note: Fall Risk Interventions:  Mobility Interventions: Communicate number of staff needed for ambulation/transfer, Bed/chair exit alarm, Patient to call before getting OOB    Mentation Interventions: Adequate sleep, hydration, pain control, Bed/chair exit alarm, Door open when patient unattended, Evaluate medications/consider consulting pharmacy, Reorient patient    Medication Interventions: Evaluate medications/consider consulting pharmacy, Bed/chair exit alarm, Patient to call before getting OOB    Elimination Interventions: Bed/chair exit alarm, Call light in reach, Patient to call for help with toileting needs, Toilet paper/wipes in reach    History of Falls Interventions: Bed/chair exit alarm, Door open when patient unattended         Problem: Patient Education: Go to Patient Education Activity  Goal: Patient/Family Education  Outcome: Progressing Towards Goal     Problem: Pressure Injury - Risk of  Goal: *Prevention of pressure injury  Description  Document David Scale and appropriate interventions in the flowsheet.   Outcome: Progressing Towards Goal  Note: Pressure Injury Interventions:  Sensory Interventions: Assess changes in LOC, Check visual cues for pain    Moisture Interventions: Absorbent underpads, Check for incontinence Q2 hours and as needed, Maintain skin hydration (lotion/cream)    Activity Interventions: Pressure redistribution bed/mattress(bed type)    Mobility Interventions: Pressure redistribution bed/mattress (bed type), HOB 30 degrees or less    Nutrition Interventions: Document food/fluid/supplement intake, Offer support with meals,snacks and hydration    Friction and Shear Interventions: HOB 30 degrees or less, Apply protective barrier, creams and emollients     Problem: Patient Education: Go to Patient Education Activity  Goal: Patient/Family Education  Outcome: Progressing Towards Goal     Problem: Patient Education: Go to Patient Education Activity  Goal: Patient/Family Education  Outcome: Progressing Towards Goal

## 2020-01-11 NOTE — PROGRESS NOTES
Progress Note         Patient: Renee Dan MRN: [id number]  CSN: [id number]    YOB: 1940  Age: 78 y.o. Sex: male    DOA: 1/5/2020 LOS:  LOS: 5 days                    Subjective:     Leroy Ang Sr. is a 78 y.o. male with a PMHx of end-stage dementia, Type II DM, HTN, and arthritis who is admitted for multiple problems including hypoxia, hypernatremia, dementia and agitation . Now comfort care    Comfortable in bed today   Respiratory status improved      Objective:     Physical Exam:  Visit Vitals  /56 (BP 1 Location: Right arm, BP Patient Position: Supine)   Pulse 88   Temp 97.7 °F (36.5 °C)   Resp 21   Ht 5' 9\" (1.753 m)   Wt 113.4 kg (250 lb)   SpO2 (!) 89%   BMI 36.92 kg/m²        General:        NAD   HEENT: NC, Atraumatic. Anicteric sclerae. Lungs: CTAB with scattered rhonchi   Heart:  RRR   Abdomen: Soft, Non distended, Non tender. +Bowel sounds, no HSM  Extremities: No c/c/e  Psych:   Lethargic   Neurologic:  Responds to loud voice, pain     Intake and Output:  Current Shift:  No intake/output data recorded. Last three shifts:  01/09 1901 - 01/11 0700  In: 60 [P.O.:60]  Out: 1320 [Urine:1320]    Labs: Results:       Chemistry Recent Labs     01/09/20  0302   *   *   K 3.8   *   CO2 26   BUN 11   CREA 1.39*   CA 8.1*   AGAP 5   BUCR 8*      CBC w/Diff Recent Labs     01/09/20  0302   WBC 6.1   RBC 4.44*   HGB 13.4   HCT 40.7      GRANS 67   LYMPH 17*   EOS 1      Cardiac Enzymes No results for input(s): CPK, CKND1, CESIA in the last 72 hours. No lab exists for component: CKRMB, TROIP   Coagulation No results for input(s): PTP, INR, APTT, INREXT, INREXT in the last 72 hours.     Lipid Panel Lab Results   Component Value Date/Time    Cholesterol, total 108 09/21/2019 10:00 AM    HDL Cholesterol 39 (L) 09/21/2019 10:00 AM    LDL, calculated 40.8 09/21/2019 10:00 AM    VLDL, calculated 28.2 09/21/2019 10:00 AM    Triglyceride 141 09/21/2019 10:00 AM    CHOL/HDL Ratio 2.8 09/21/2019 10:00 AM      BNP No results for input(s): BNPP in the last 72 hours. Liver Enzymes No results for input(s): TP, ALB, TBIL, AP, SGOT, GPT in the last 72 hours. No lab exists for component: DBIL   Thyroid Studies Lab Results   Component Value Date/Time    TSH 0.997 03/23/2011 02:12 PM                Assessment and Plan:     Rosalia Null Sr. is a 78 y.o. male with a PMHx of end-stage dementia, Type II DM, HTN, and arthritis who is admitted for multiple problems including hypoxia, hypernatremia, dementia and agitation. 1. Acute hypoxic respiratory failure- likely 2/2 difficulty clearing secretions and #2    2. Aspiration PNA   3. Hypernatremia   4. Dementia w/agitation   5. HTN   6. Type II DM  7. Dysphagia      Comfort measures only   Hospice consulted   Will continue depakote IV       Case discussed with:  []Patient  [x]Family  [x]Nursing  [x]Case Management  Diet: Comfort feeding   Code Status: DNR   Disposition: Continue current care        PAMELA Woods DO  1/11/2020

## 2020-01-11 NOTE — ROUTINE PROCESS
Bedside and Verbal shift change report given to Nathaniel Vieira (oncoming nurse) by River Johnson RN (offgoing nurse). Report included the following information SBAR, Kardex and Recent Results.

## 2020-01-11 NOTE — PROGRESS NOTES
Assumed pt's care with family member at bedside, pt alert and respond to conversation appropriately, he stated he want 'food and lots of food'. RN will attempt applesauce. Pt fed with few spoons of applesauce, tolerated well w/o coughing, family member at bedside. Oral care provided, he denies any pain and no signs of discomfort noted; bed locked an in low position, call light within pt's reach. Will continue to monitor. 0032: Scheduled meds administered, pt resting quietly. 4019: Bedside shift change report given to Ethan Jaramillo RN (oncoming nurse) by Yovana Levin RN (offgoing nurse). Report included the following information SBAR, Procedure Summary, Intake/Output, MAR and Recent Results.

## 2020-01-12 NOTE — ROUTINE PROCESS
Bedside and Verbal shift change report given to Edson Harris RN (oncoming nurse) by Jeronimo Nix RN (offgoing nurse). Report included the following information SBAR, Kardex and Recent Results.

## 2020-01-12 NOTE — PROGRESS NOTES
Progress Note         Patient: Kimberly Gan MRN: [id number]  CSN: [id number]    YOB: 1940  Age: 78 y.o. Sex: male    DOA: 1/5/2020 LOS:  LOS: 6 days                    Subjective:     Rosales Ang Sr. is a 78 y.o. male with a PMHx of end-stage dementia, Type II DM, HTN, and arthritis who is admitted for multiple problems including hypoxia, hypernatremia, dementia and agitation . Now comfort care    Comfortable in bed today   Fever today, hypoxia despite O2 at 10 LPM     Objective:     Physical Exam:  Visit Vitals  /66 (BP 1 Location: Right arm, BP Patient Position: Supine)   Pulse (!) 118   Temp 99.7 °F (37.6 °C)   Resp 19   Ht 5' 9\" (1.753 m)   Wt 113.4 kg (250 lb)   SpO2 (!) 85%   BMI 36.92 kg/m²        General:        NAD   HEENT: NC, Atraumatic. Anicteric sclerae. Lungs: CTAB with scattered rhonchi   Heart:  RRR   Abdomen: Soft, Non distended, Non tender. +Bowel sounds, no HSM  Extremities: No c/c/e  Psych:   Lethargic   Neurologic:  Unresponsive      Intake and Output:  Current Shift:  No intake/output data recorded. Last three shifts:  01/10 1901 - 01/12 0700  In: 260 [P.O.:60; I.V.:200]  Out: 1495 [Urine:1495]    Labs: Results:       Chemistry No results for input(s): GLU, NA, K, CL, CO2, BUN, CREA, CA, AGAP, BUCR, TBIL, GPT, AP, TP, ALB, GLOB, AGRAT in the last 72 hours. CBC w/Diff No results for input(s): WBC, RBC, HGB, HCT, PLT, GRANS, LYMPH, EOS, HGBEXT, HCTEXT, PLTEXT, HGBEXT, HCTEXT, PLTEXT in the last 72 hours. Cardiac Enzymes No results for input(s): CPK, CKND1, CESIA in the last 72 hours. No lab exists for component: CKRMB, TROIP   Coagulation No results for input(s): PTP, INR, APTT, INREXT, INREXT in the last 72 hours.     Lipid Panel Lab Results   Component Value Date/Time    Cholesterol, total 108 09/21/2019 10:00 AM    HDL Cholesterol 39 (L) 09/21/2019 10:00 AM    LDL, calculated 40.8 09/21/2019 10:00 AM    VLDL, calculated 28.2 09/21/2019 10:00 AM    Triglyceride 141 09/21/2019 10:00 AM    CHOL/HDL Ratio 2.8 09/21/2019 10:00 AM      BNP No results for input(s): BNPP in the last 72 hours. Liver Enzymes No results for input(s): TP, ALB, TBIL, AP, SGOT, GPT in the last 72 hours. No lab exists for component: DBIL   Thyroid Studies Lab Results   Component Value Date/Time    TSH 0.997 03/23/2011 02:12 PM                Assessment and Plan:     Barron Rosales Sr. is a 78 y.o. male with a PMHx of end-stage dementia, Type II DM, HTN, and arthritis who is admitted for multiple problems including hypoxia, hypernatremia, dementia and agitation. 1. Acute hypoxic respiratory failure- likely 2/2 aspiration and #2    2. Aspiration PNA   3. Hypernatremia   4. Dementia w/agitation   5. HTN   6. Type II DM  7. Dysphagia      Comfort measures only   Hospice consulted   Will continue depakote IV       Case discussed with:  []Patient  [x]Family  [x]Nursing  []Case Management  Diet: Comfort feeding   Code Status: DNR    Disposition: Continue current care        PAMELA Aceves, DO  1/12/2020

## 2020-01-12 NOTE — PROGRESS NOTES
Assumed pt's care with family member at bedside-Mr. Pinedo, pt not talking at this moment , able to respond to question upon numerous attempt, extremely lethargic. Will continue to monitor. 2355: Pt unresponsive at this moment, O2 at 66%, , RR 22; will continue to monitor. Bedside shift change report given to Valdemar Anderson RN (oncoming nurse) by Beba Juarez RN (offgoing nurse). Report included the following information SBAR, Intake/Output and MAR.

## 2020-01-12 NOTE — ROUTINE PROCESS
Mews score of 5 related to elevated temp, hr and decreased sat. Pt is comfort care. He is on 10 liters of mally flow oxygen. Meds and ice packs applied for elevated temp. Cont to monitor and make comfortable.

## 2020-01-13 NOTE — PROGRESS NOTES
Problem: Falls - Risk of  Goal: *Absence of Falls  Description  Document Kati Taylor Fall Risk and appropriate interventions in the flowsheet. Outcome: Progressing Towards Goal  Note: Fall Risk Interventions:  Mobility Interventions: Bed/chair exit alarm    Mentation Interventions: Adequate sleep, hydration, pain control, Bed/chair exit alarm, Door open when patient unattended, Room close to nurse's station, Toileting rounds, Update white board    Medication Interventions: Bed/chair exit alarm    Elimination Interventions: Bed/chair exit alarm, Toileting schedule/hourly rounds    History of Falls Interventions: Bed/chair exit alarm, Door open when patient unattended, Room close to nurse's station         Problem: Patient Education: Go to Patient Education Activity  Goal: Patient/Family Education  Outcome: Progressing Towards Goal     Problem: Pressure Injury - Risk of  Goal: *Prevention of pressure injury  Description  Document David Scale and appropriate interventions in the flowsheet. Outcome: Progressing Towards Goal  Note: Pressure Injury Interventions:  Sensory Interventions: Assess changes in LOC, Check visual cues for pain, Keep linens dry and wrinkle-free, Minimize linen layers, Pressure redistribution bed/mattress (bed type), Turn and reposition approx.  every two hours (pillows and wedges if needed)    Moisture Interventions: Absorbent underpads, Check for incontinence Q2 hours and as needed, Internal/External urinary devices, Maintain skin hydration (lotion/cream), Moisture barrier, Minimize layers    Activity Interventions: Pressure redistribution bed/mattress(bed type)    Mobility Interventions: HOB 30 degrees or less, Pressure redistribution bed/mattress (bed type)    Nutrition Interventions: Document food/fluid/supplement intake    Friction and Shear Interventions: Lift team/patient mobility team, Minimize layers, HOB 30 degrees or less                Problem: Non-Violent Restraints  Goal: Non-violent Restaints:Standard Interventions  Outcome: Progressing Towards Goal     Problem: Patient Education: Go to Patient Education Activity  Goal: Patient/Family Education  Outcome: Progressing Towards Goal     Problem: Breathing Pattern - Ineffective  Goal: *Use of effective breathing techniques  Outcome: Progressing Towards Goal     Problem: Mood - Altered  Goal: *Alleviation of anxiety and depressive symptoms  Outcome: Progressing Towards Goal

## 2020-01-13 NOTE — ROUTINE PROCESS
Bedside and Verbal shift change report given to Peter Leger RN (oncoming nurse) by Andres Singh RN   (offgoing nurse). Report included the following information SBAR, Kardex, Intake/Output, MAR and Recent Results.

## 2020-01-13 NOTE — PROGRESS NOTES
[facility]: [phone]  [facility]: [phone]    Palliative care visit with patient at bedside to assess comfort level. Mr. Mateo Bo is unresponsive to verbal or tactile stimuli. Cool to touch left lower extremity, armando in place draining que colored urine. Morphine PCA started at 11 AM at rate of 0.5mg/hr. Pt remains tachypneic with RR of 36. Will review with bedside RN and adjust Morphine PCA to manage symptoms. Palliative team will continue to follow and assess for comfort while inpatient.      Orville Mensah RN, [facility]  Palliative Medicine Inpatient RN  [facility]   Palliative COPE Line: [phone])

## 2020-01-13 NOTE — PROGRESS NOTES
Palliative Medicine    Palliative Medicine team Alok Brand NP and Tenisha Granados RN rounded on comfort care patient. Pt's RN at bedside providing care. Pt was unresponsive. He was extremely tachypneic with RR 36-40 times/min. Per BSRN, scheduled Roxanol had just been administered. Will start pt on a Morphine PCA to ensure comfort and decreased WOB. BSRN made aware. Hospice liaison also rounded on pt. Pt does not appear appropriate for transport home at this time d/t uncontrolled symptoms. Pt remains DNR/DNI on comfort measures only. POST on file. Potential dispo plan is unknown. Pt on comfort measures with hospice support. Thank you for the Palliative Medicine consult and allowing us to participate in the care of Mr. Valery Parson.  Will continue to monitor and provide support.     Tenisha Granados RN, BSN  Palliative Medicine Inpatient RN  [facility]  Palliative COPE Line: [phone])

## 2020-01-13 NOTE — PROGRESS NOTES
[facility]  Death Pronouncement      Patient: Erin Crooks Sr. MRN: [id number]  CSN: [id number]    YOB: 1940  Age: 78 y.o. Sex: male      Patient lying in bed, mouth open, eyes closed. Verified identity via bracelet. Pupils fixed and equal bilaterally. No response to verbal or painful stimuli. No heart or lung sounds auscultated. Chest rise not visualized. No peripheral pulses.     Death officially pronounced at 17:16, 1/13/2020    Karrie Maher MD, PGY-1   [address] Medicine   Intern Pager: [phone]   January 13, 2020, 5:24 PM

## 2020-01-13 NOTE — ROUTINE PROCESS
Pt turned and repositioned, noted that the patient stopped breathing. Family present at bedside. House Officer, supervisor, primary [address], and shannan were all notified. The shannan was already present on the floor. The family and  were present. [address] Marcus MD  Pronounced . 1727 PCA morphine stopped and medication was wasted with Kee OCAMPO RN. 28. 9 ml of morphine was wasted and recorded. Patient received . 061 ml.    1730 life net called and denied donation.   18  home is [facility] on [facility]

## 2020-01-13 NOTE — PROGRESS NOTES
responded to Death of  Gonzalez Dawn, who was a 78 y.o.,male,     The  provided the following Interventions:  Provided crisis pastoral care, pastoral support and grief interventions. Offered prayers on behalf of the patient. Chart reviewed. Plan:  Chaplains will continue to follow and will provide pastoral care on an as needed/requested basis and grief support for the family. Chaplain Dante De Los Santos[address] [place] [address]   [phone]

## 2020-01-13 NOTE — PROGRESS NOTES
[facility]: [phone]  [facility]: [phone]   [facility]: [phone]    Patient Name: Samm Walker. YOB: 1940    Date of Follow up: 1/13/20  Reason for Consult: Establish Goals of Care  Requesting Provider:  Merlin Kin MD  Primary Care Physician: Corrina Nathan MD      SUMMARY:   Samm Mendoza is a 78 y.o. male with a past history of long term dementia, HTN, DM Type2, and arthritis  who was admitted on 1/5/2020 from home with a diagnosis of end stage dementia and respiratory failure with hypoxia, hypernatremia. Current medical issues leading to Palliative Medicine involvement include: establish goals of care and AMD.    CHIEF COMPLAINT: dyspnea    HPI/SUBJECTIVE:    78year old very robust AAM that has been living at home with his wife of 64 years. Wife reports that patient has been living with dementia for the past 25 years but noted a severe decline in his overall condition over the past several months. Began aspirating liquids and developed respiratory distress. 1/13/20  Pt with increased dyspnea and congestion. Suctioned by floor RN and cleared. Tachypnea noted with Kussmaul breathing pattern. Starting patient on a PCA Morphine today. After speaking with hospice this team agrees that patient is not stable for transport this am. Will need to get his symptoms under control. Palliative will continue to follow him today. Morphine dose MME is 35mg/24 hours PO starting on Morphine PCA 0.5mg continuous per hour with 1mg IV push Q 1 hr prn. Checked on patient later in the day. Noted that PCA was started at 11am. At 2:00 patient was resting appears comfortable. Continues to have some tachypnea that did slow down during observation from 35 down to 20 per minute. Sonorous breathing noted. Wife is at the bedside. Explained patient's condition and the dying process.  She verbalized understanding stating that she has been through this with other family. She also verbalized that she has given him permission to go. Palliative will continue to follow as long as patient is in patient comfort care. 1/10/2020  Pt with reports of increased dyspnea over the night shift. Pt placed on high flow overnight at 50L and placed on restraints. Pt presently does not appear in any distress. 1/9/2020: Palliative care team including Dwayne Garcia LCSW and myself met with patient and his wife at bedside. Also present was the couple's . Patient is lying in bed with non-rebreather over hiflow O2, he is not following commands. He intermittently responds to voice but is not responding to questions. The patient is:   [] Verbal and participatory  [x] Non-participatory due to:  Medical condition     GOALS OF CARE:  Patient/Health Care Proxy Stated Goals: Comfort      TREATMENT PREFERENCES:   Code Status: DNR/DNI with comfort measures only. PALLIATIVE DIAGNOSES:   1. Goals of Care/ACP  2.   End stage dementia  3. Respiratory failure  4. Dysphagia       PLAN:   1/13/2020- Goals of Care/ACP  No changes in patient goals of care. Remains on Comfort care in patient due to unmanaged symptoms of dyspnea and severe tachypnea. Plan remains to transport home in the event he is more stable. Will continue to monitor and adjust medications as needed. Hospice is aware. Pt remains a DNR/DNI on Comfort care    See below for prior visits     1/10/2020- Goals of Care/ACP  Palliative team including Laura Villanueva RN and this NP met with and discussed comfort care objectives with staff. Discontinued restraints and high flow and scheduled Morphine SL Q 4 hours. Goals for patient are Comfort Care and home with hospice. Preparing patient for home based treatments and medications. 12:30 addendum:   Team revisited patient to determine comfort status. Wife was at the bedside and reports that she is feeling scared about taking patient home. Provided supportive listening and assurance that she would have hospice support. Patient appears very comfortable and is not fighting. Removed his restraints and noted his last dose of Morphine at 10:00am PRN and then Scheduled at 12:00pm.  Is now scheduled for Q 4 hours to alleviate air hunger   Wife continues to need support and reassurance and she reports that a \"friend of the family\" was at home now helping prepare for patient's discharge to home. Encouraged to play patient's favorite music and talk with him. 1/9/2019: Family received update from physician regarding patient's medical condition. He is not responding to the antibiotic treatment. Decision has been made to change goals of care to comfort measures only. POST form completed: DNR/DNI, comfort measures and no feeding tubes and was signed by the MPOA, his wife, Roscoe Baker. Comfort orders placed in computer. MD updated. Family plans to meet with hospice liaison today. GOALS OF CARE: DNR/DNI, comfort measures with discharge to home with hospice services. See previous notes shown below:     1/8/2019  1. Goals of Care/ACP  Palliative team including Lily Bundy met with patient and his wife and brother in law. Discussion of prognosis and goals of care with benefits and burdens of use of a feeding tube. At this time wife states that she is not in favor of putting in a NGT or PEG tube. States that his quality of life has recently been pretty low and he has indicated to her that he would rather \"be dead than live like this\" Wife reports that he has always been consistent with his wishes for DNR and DNI. Orders updated in the chart. Also briefly reviewed his options for hospice care at home which was met with appreciation. Placed a hospice consult. Notified Dr Dior Govea and Case Management. Palliative to follow tomorrow for POST completion at family request.  2.   End Stage Dementia  Pt now a FAST Score of 7F. Only  A word here and there. Unable to hold up head or smile. 3.   Respiratory failure  Patient with large amount of scattered rhonchi. CXR shows large amount of infiltrates sami on right side. Patient has suctioning available and on a scopolamine patch. 4.   Dysphagia  Pt unable to follow commands for MBS. Gurgled breath sounds and unable to manage secretions. Family choosing comfort feeding opposed to NGT placement or PEG tube  5. Initial consult note routed to primary continuity provider  6. Communicated plan of care with: Palliative IDT      Advance Care Planning:  [] The [facility] Interdisciplinary Team has updated the ACP Navigator with [address] and Patient Capacity    Primary Decision [facility] ([address]): Wife     Medical Interventions: Comfort measures     Artificially Administered Nutrition: No feeding tube     As far as possible, the palliative care team has discussed with patient / health care proxy about goals of care / treatment preferences for patient. HISTORY:     History obtained from: chart    Principal Problem:    Aspiration pneumonia ([address] (1/9/2020)    Active Problems:    Acute respiratory failure with hypoxia ([address] (1/6/2020)      Confusion (1/6/2020)      Hypoxia (1/6/2020)      Hypernatremia (1/7/2020)      Dysphagia (1/8/2020)      Goals of care, counseling/discussion ()      Past Medical History:   Diagnosis Date    Anxiety     Arthritis     Cancer ([address] 2009    bladder     Carotid duplex 05/26/2011    No occlusive disease >49% bilaterally.     Chronic back pain 5/6/2010    Chronic traumatic encephalopathy     Dementia ([address]     Depression     he has taken Xanax for 16 years for this    Diabetes ([address]     Dizziness     ED (erectile dysfunction)     Fibrosis of knee joint     Peripatellar, right    Headache(784.0)     HTN (hypertension), benign 4/1/2010    Late onset Alzheimer's disease with behavioral disturbance ([address]     Left wrist pain     Lumbar spinal stenosis 9/25/2010  Neuropathy of lower extremity     Obesity     Osteoarthritis of both knees     Sleep apnea     cpap use    SOB (shortness of breath)     Vertigo, benign positional       Past Surgical History:   Procedure Laterality Date    BIOPSY  12/19/2007    HX OTHER SURGICAL  11/2014    right knee    HX UROLOGICAL      Bladder CA      Family History   Problem Relation Age of Onset    Hypertension Mother     Heart Disease Mother     Other Mother         Alzheimer's disease    Diabetes Neg Hx      History reviewed, no pertinent family history.   Social History     Tobacco Use    Smoking status: Former Smoker    Smokeless tobacco: Never Used    Tobacco comment: quit smoking in 2000   Substance Use Topics    Alcohol use: No     Alcohol/week: 0.0 standard drinks     Allergies   Allergen Reactions    Chlorhexidine Towelette Itching      Current Facility-Administered Medications   Medication Dose Route Frequency    morphine (PF)  mg/30 ml   IntraVENous CONTINUOUS    morphine injection 1 mg  1 mg IntraVENous Q1H PRN    LORazepam (INTENSOL) 2 mg/mL oral concentrate 0.5 mg  0.5 mg SubLINGual Q1H PRN    scopolamine (TRANSDERM-SCOP) 1 mg over 3 days 1 Patch  1 Patch TransDERmal Q72H PRN    morphine (ROXANOL) concentrated oral syringe 5 mg  5 mg SubLINGual Q30MIN PRN    acetaminophen (TYLENOL) suppository 650 mg  650 mg Rectal Q6H PRN    valproate (DEPACON) 125 mg in 0.9% sodium chloride 50 mL IVPB  125 mg IntraVENous Q6H    haloperidol lactate (HALDOL) injection 5 mg  5 mg IntraVENous Q8H PRN          Clinical Pain Assessment (nonverbal scale for nonverbal patients): Clinical Pain Assessment  Severity: 0     Activity (Movement): Lying quietly, normal position    Duration: for how long has pt been experiencing pain (e.g., 2 days, 1 month, years)  Frequency: how often pain is an issue (e.g., several times per day, once every few days, constant)     FUNCTIONAL ASSESSMENT:     Palliative Performance Scale (PPS):  PPS: 10    ECOG  ECOG Status : Completely disabled     PSYCHOSOCIAL/SPIRITUAL SCREENING:      Any spiritual / Hinduism concerns:  [] Yes /  [x] No    Caregiver Burnout:  [] Yes /  [x] No /  [] No Caregiver Present      Anticipatory grief assessment:   [x] Normal  / [] Maladaptive        REVIEW OF SYSTEMS:     Positive and pertinent negative findings in ROS are noted above in HPI. The following systems were [x] reviewed / [] unable to be reviewed as noted in HPI  Other findings are noted below. Constitutional: lethargic, AAM that in mild respiratory distress, very warm to touch with facial flushing. Eyes: pupils not observed, eyes closed   ENMT: no nasal discharge, dry mucous membranes  Cardiovascular:  distal pulses intact; generalized edema. Respiratory: terminal breath sounds  Gastrointestinal: soft non-tender   Musculoskeletal: no deformity, no tenderness to palpation  Skin: hot, dry  Neurologic: Not oriented and not able to follow commands       Systems: constitutional, ears/nose/mouth/throat, respiratory, gastrointestinal, genitourinary, musculoskeletal, integumentary, neurologic, psychiatric, endocrine. Positive findings noted below. Modified ESAS Completed by: provider   Fatigue: 5 Drowsiness: 5     Pain: 0   Anxiety: 0     Anorexia: 0 Dyspnea: 3     Constipation: No     Stool Occurrence(s): 1        PHYSICAL EXAM:     Wt Readings from Last 3 Encounters:   01/07/20 113.4 kg (250 lb)   12/31/19 116.1 kg (256 lb)   11/04/19 117.5 kg (259 lb)     Blood pressure 131/64, pulse (!) 109, temperature (!) 102.7 °F (39.3 °C), resp. rate 20, height 5' 9\" (1.753 m), weight 113.4 kg (250 lb), SpO2 (!) 78 %.   Pain:  Pain Scale 1: Adult Nonverbal Pain Scale  Pain Intensity 1: 0                      LAB AND IMAGING FINDINGS:     Lab Results   Component Value Date/Time    WBC 6.1 01/09/2020 03:02 AM    HGB 13.4 01/09/2020 03:02 AM    PLATELET 531 37/25/9052 03:02 AM     Lab Results   Component Value Date/Time    Sodium 148 (H) 01/09/2020 03:02 AM    Potassium 3.8 01/09/2020 03:02 AM    Chloride 117 (H) 01/09/2020 03:02 AM    CO2 26 01/09/2020 03:02 AM    BUN 11 01/09/2020 03:02 AM    Creatinine 1.39 (H) 01/09/2020 03:02 AM    Calcium 8.1 (L) 01/09/2020 03:02 AM    Magnesium 2.0 01/05/2020 08:05 PM    Phosphorus 3.5 08/17/2015 02:50 AM      Lab Results   Component Value Date/Time    AST (SGOT) 42 (H) 01/07/2020 02:11 AM    Alk. phosphatase 89 01/07/2020 02:11 AM    Protein, total 8.0 01/07/2020 02:11 AM    Albumin 3.4 01/07/2020 02:11 AM    Globulin 4.6 (H) 01/07/2020 02:11 AM     Lab Results   Component Value Date/Time    INR 1.2 01/05/2020 08:05 PM    Prothrombin time 15.2 01/05/2020 08:05 PM    aPTT 25.3 01/05/2020 08:05 PM      No results found for: IRON, FE, TIBC, IBCT, PSAT, FERR   No results found for: PH, PCO2, PO2  No components found for: [place]   Lab Results   Component Value Date/Time     (H) 01/07/2020 02:11 AM    CK - MB 2.5 01/07/2020 02:11 AM              Total time: 25 minutes > 50% counseling / coordination:   Counseling / coordination time, spent as noted above. Time was spent in direct consultation with patient's family and medical team.       Prolonged service was provided for  []30 min   []75 min in face to face time in the presence of the patient, spent as noted above. Time Start:   Time End:   Note: this can only be billed with 48469 (initial) or 15521 (follow up). If multiple start / stop times, list each separately.

## 2020-01-13 NOTE — DISCHARGE SUMMARY
Discharge/Death Summary      Patient: Samm Mendoza MRN: [id number]  [facility]: [id number]    YOB: 1940  Age: 78 y.o. Sex: male    DOA: 2020 LOS:  LOS: 7 days   : 20     Admission Diagnoses: Acute respiratory failure with hypoxia ([facility] [address] [J96.01]  Hypoxia [R09.02]  Confusion [R41.0]  Hypoxia [R09.02]  Hypernatremia [E87.0]    Discharge Diagnoses:    1. Cardiac and respiratory arrest   2. Acute hypoxic respiratory failure  3. Aspiration pneumonia   4. End-stage dementia     Physical Exam:   Pupils fixed and dilated. No respirations, no heart rate. Hospital Course:   Mr. Felisa Conklin is a 79 y/o male with a PMHx of end-stage dementia, Type II DM, HTN, and arthritis who presented to the ED with complaints of SOB and a cough with foamy secretions and gurgling respirations. He was found to be hypoxic in the ED with SpO2 71% and was started on BIPAP with some improvement. Work-up included a sodium of 149 and CXR and CTA that were negative for consolidation and PE. He received suctioning, IVF and IV abx in the ED. He was then admitted for suspected aspiration and hypernatremia. After admission, Mr. Salo Arguelles improved and was weaned off BIPAP and placed on O2 by NC. His wife provided more history and stated that over the past several months he had become increasingly confused and agitated and had had numuerus falls. When examined the next day, Mr. Lamine Rea responded to pain and loud voice, but appeared somnolent. He failed a bedside swallow and was made NPO. Soon thereafter, his respirations became increasingly labored and he developed coarse breath sounds and scattered rhonchi on lung exam. A CXR showed considerable worsening of R sided infiltrates. Despite being continued on broad-spectrum abx he developed fever. Palliative care was consulted. Mrs. Ang was his wife and next-of-kin and stated that he wouldn't want and she did not want him to have a feeding tube. Considering his end-stage dementia and worsening aspiration and pneumonia, comfort care was discussed. After meeting with her  and family, Mrs. Adela Colon decided to make MrFerdinand Ang comfort care. Over the next few days, Mr. Adela Colon was treated with comfort measures including oxygen, morphine, scopolamine and comfort feeds. He remained comfortable and his family gathered frequently at his bedside. On 20, Mr. Adela Colon developed agonal respirations. He remained comfortable and then  on 20. Death was pronounced at 5:16 pm on 2020. Consults:   Jessica Green NP       Minutes spent on discharge: >30 minutes spent coordinating this discharge summary       PAMELA Samson,    2020

## 2020-01-13 NOTE — PROGRESS NOTES
NUTRITION    Nutrition Screen     RECOMMENDATIONS / PLAN:     - Provide nutrition interventions consistent with plan of care goals for pt; comfort measures; diet for comfort  - Continue RD inpatient monitoring and evaluation. NUTRITION INTERVENTIONS & DIAGNOSIS:     - Meals/snacks: modify composition  - Medical food supplement therapy:     Nutrition Diagnosis: No nutrition diagnosis at this time. ASSESSMENT:     Pt with dementia; poor/no meal intake per chart. On comfort measures. Had diet for comfort. Palliative and Hospice following    Nutritional intake adequate to meet patients estimated nutritional needs:  No    Diet: DIET DIABETIC CONSISTENT CARB Regular; 2 GM NA (House Low NA)      Food Allergies:  None known   Current Appetite: Unknown  Appetite/meal intake prior to admission: Unknown  Feeding Limitations:  [x] Swallowing difficulty: SLP recommended NPO; pt has diet for comfort. [] Chewing difficulty    [] Other:  Current Meal Intake: No data found. BM: 1/9  Skin Integrity:  Left arm abscess  Edema:   [] No     [x] Yes   Pertinent Medications: Reviewed    No results for input(s): NA, K, CL, CO2, GLU, BUN, CREA, CA, MG, PHOS, ALB, PREALB, TBIL, SGOT, ALT in the last 72 hours. Intake/Output Summary (Last 24 hours) at 1/13/2020 1233  Last data filed at 1/13/2020 0130  Gross per 24 hour   Intake    Output 1625 ml   Net -1625 ml       Anthropometrics:  Ht Readings from Last 1 Encounters:   01/07/20 5' 9\" (1.753 m)     Last 3 Recorded Weights in this Encounter    01/05/20 1950 01/07/20 0045   Weight: 113.4 kg (250 lb) 113.4 kg (250 lb)     Body mass index is 36.92 kg/m².     Obese, Class II    Weight History:   Weight fluctuations PTA per chart hx    Weight Metrics 1/7/2020 12/31/2019 11/4/2019 10/22/2019 9/27/2019 5/24/2019 2/8/2019   Weight 250 lb 256 lb 259 lb 252 lb 262 lb 264 lb 252 lb   BMI 36.92 kg/m2 37.8 kg/m2 38.25 kg/m2 37.21 kg/m2 37.59 kg/m2 37.88 kg/m2 36.16 kg/m2        Admitting Diagnosis: Acute respiratory failure with hypoxia (HCC) [J96.01]  Hypoxia [R09.02]  Confusion [R41.0]  Hypoxia [R09.02]  Hypernatremia [E87.0]  Pertinent PMHx:  Bladder cancer, dementia, depression, DM, HTN    Education Needs:        [x] None identified  [] Identified - Not appropriate at this time  []  Identified and addressed - refer to education log  Learning Limitations:   [] None identified  [x] Identified: dementia    Cultural, Restorationism & ethnic food preferences:  [x] None identified    [] Identified and addressed     ESTIMATED NUTRITION NEEDS:     Calories: 4968-8066 kcal (MSJx1-1.1) based on  [x] Actual BW: 113 kg      [] IBW   Protein:  gm (0.8-1 gm/kg) based on  [x] Actual BW      [] IBW   Fluid: 1 mL/kcal     MONITORING & EVALUATION:     Nutrition Goal(s):   - PO nutrition intake will meet >75% of patient estimated nutritional needs within the next 7 days. Outcome: New/Initial goal     Monitoring:   [x] Food and nutrient intake   [x] Food and nutrient administration  [x] Comparative standards   [x] Nutrition-focused physical findings   [x] Anthropometric Measurements   [x] Treatment/therapy   [x] Biochemical data, medical tests, and procedures        Previous Recommendations (for follow-up assessments only):  Not Applicable     Discharge Planning: No nutritional discharge needs at this time. Participated in care planning, discharge planning, & interdisciplinary rounds as appropriate.       Erna Cha RD  Pager: [phone]

## 2020-01-13 NOTE — HOSPICE
Bedside visit. Nurse at bedside. No family present. Thank you for the referral to [facility]. Please contact [phone] with any questions or concerns.             VIRIDIANA JohnstonN, RN  Clinical Coordinator  [place] [address] [place] [address], [address] [place] [address] Str.  [phone]

## 2020-01-14 ENCOUNTER — HOME CARE VISIT (OUTPATIENT)
Dept: HOSPICE | Facility: HOSPICE | Age: 80
End: 2020-01-14

## 2020-01-14 LAB
BACTERIA SPEC CULT: NORMAL
BACTERIA SPEC CULT: NORMAL
SERVICE CMNT-IMP: NORMAL
SERVICE CMNT-IMP: NORMAL

## 2020-01-16 ENCOUNTER — TELEPHONE (OUTPATIENT)
Dept: FAMILY MEDICINE CLINIC | Age: 80
End: 2020-01-16

## 2020-01-17 RX ORDER — ESCITALOPRAM OXALATE 10 MG/1
TABLET ORAL
Qty: 90 TAB | Refills: 2 | OUTPATIENT
Start: 2020-01-17

## 2020-01-27 RX ORDER — MEMANTINE HYDROCHLORIDE 10 MG/1
TABLET ORAL
Qty: 180 TAB | Refills: 2 | OUTPATIENT
Start: 2020-01-27

## 2023-03-26 NOTE — ED NOTES
I have reviewed discharge instructions with the caregiver. The caregiver verbalized understanding. 24

## 2023-04-21 NOTE — PROGRESS NOTES
01/08/20 2037   Vitals   Temp (!) 101.1 °F (38.4 °C)  (tylenol suppository given)   Temp Source Axillary   Pulse (Heart Rate) (!) 102   Heart Rate Source Monitor   Resp Rate 18   O2 Sat (%) 93 %   Level of Consciousness Alert   /75   MAP (Calculated) 103   BP 1 Location Right arm   BP 1 Method Automatic   MEWS Score 4   Oxygen Therapy   O2 Device Nasal cannula   O2 Flow Rate (L/min) 10 l/min   Patient Observation   Observations meds see mar     MEWS 4 r/t elevated temp and HR. Suppository tylenol given, blankets removed and ice packs put under arms. Pt also with O2 sats in 80's, NC bump up to 10 L, now at 93%. MD notified and orders for high flow NC ordered. Pt placed on high flow NC at 100% by RT and sats slowly rising. Temp recheck 97.6. Will continue to monitor. No